# Patient Record
Sex: MALE | Race: WHITE | NOT HISPANIC OR LATINO | Employment: FULL TIME | URBAN - METROPOLITAN AREA
[De-identification: names, ages, dates, MRNs, and addresses within clinical notes are randomized per-mention and may not be internally consistent; named-entity substitution may affect disease eponyms.]

---

## 2020-10-01 ENCOUNTER — OCCUPATIONAL HEALTH (OUTPATIENT)
Dept: URGENT CARE | Facility: CLINIC | Age: 50
End: 2020-10-01

## 2020-10-01 DIAGNOSIS — Z02.1 PRE-EMPLOYMENT EXAMINATION: Primary | ICD-10-CM

## 2020-10-01 PROCEDURE — 99499 UNLISTED E&M SERVICE: CPT | Mod: S$GLB,,, | Performed by: FAMILY MEDICINE

## 2020-10-01 PROCEDURE — 92283 EXTND COLOR VISION XM: CPT | Mod: S$GLB,,, | Performed by: FAMILY MEDICINE

## 2020-10-01 PROCEDURE — 92552 PURE TONE AUDIOMETRY AIR: CPT | Mod: S$GLB,,, | Performed by: FAMILY MEDICINE

## 2020-10-01 PROCEDURE — 99172 PR VISUAL FUNCT SCREENING, BILAT: ICD-10-PCS | Mod: S$GLB,,, | Performed by: FAMILY MEDICINE

## 2020-10-01 PROCEDURE — 92552 PR PURE TONE AUDIOMETRY, AIR: ICD-10-PCS | Mod: S$GLB,,, | Performed by: FAMILY MEDICINE

## 2020-10-01 PROCEDURE — 92283 PR COLOR VISION EXAMINATION: ICD-10-PCS | Mod: S$GLB,,, | Performed by: FAMILY MEDICINE

## 2020-10-01 PROCEDURE — 99499 PHYSICAL, BASIC COMPLEXITY: ICD-10-PCS | Mod: S$GLB,,, | Performed by: FAMILY MEDICINE

## 2020-10-01 PROCEDURE — 99172 OCULAR FUNCTION SCREEN: CPT | Mod: S$GLB,,, | Performed by: FAMILY MEDICINE

## 2020-11-19 ENCOUNTER — OFFICE VISIT (OUTPATIENT)
Dept: FAMILY MEDICINE | Facility: CLINIC | Age: 50
End: 2020-11-19
Payer: OTHER GOVERNMENT

## 2020-11-19 VITALS
HEIGHT: 70 IN | OXYGEN SATURATION: 98 % | TEMPERATURE: 98 F | DIASTOLIC BLOOD PRESSURE: 84 MMHG | HEART RATE: 89 BPM | BODY MASS INDEX: 37.15 KG/M2 | SYSTOLIC BLOOD PRESSURE: 132 MMHG | RESPIRATION RATE: 18 BRPM | WEIGHT: 259.5 LBS

## 2020-11-19 DIAGNOSIS — I10 ESSENTIAL HYPERTENSION: Primary | ICD-10-CM

## 2020-11-19 DIAGNOSIS — R53.83 OTHER FATIGUE: ICD-10-CM

## 2020-11-19 DIAGNOSIS — R63.5 WEIGHT GAIN: ICD-10-CM

## 2020-11-19 DIAGNOSIS — L71.9 ROSACEA: ICD-10-CM

## 2020-11-19 DIAGNOSIS — Z12.11 SCREENING FOR COLON CANCER: ICD-10-CM

## 2020-11-19 DIAGNOSIS — L30.8 OTHER ECZEMA: ICD-10-CM

## 2020-11-19 DIAGNOSIS — E66.01 CLASS 2 SEVERE OBESITY DUE TO EXCESS CALORIES WITH SERIOUS COMORBIDITY AND BODY MASS INDEX (BMI) OF 37.0 TO 37.9 IN ADULT: ICD-10-CM

## 2020-11-19 DIAGNOSIS — F32.5 MAJOR DEPRESSIVE DISORDER WITH SINGLE EPISODE, IN FULL REMISSION: ICD-10-CM

## 2020-11-19 DIAGNOSIS — G47.09 OTHER INSOMNIA: ICD-10-CM

## 2020-11-19 PROBLEM — L30.9 ECZEMA: Status: ACTIVE | Noted: 2020-11-19

## 2020-11-19 PROBLEM — E66.812 CLASS 2 SEVERE OBESITY DUE TO EXCESS CALORIES WITH SERIOUS COMORBIDITY AND BODY MASS INDEX (BMI) OF 37.0 TO 37.9 IN ADULT: Status: ACTIVE | Noted: 2020-11-19

## 2020-11-19 PROCEDURE — 99213 OFFICE O/P EST LOW 20 MIN: CPT | Mod: PBBFAC,PO,25 | Performed by: FAMILY MEDICINE

## 2020-11-19 PROCEDURE — 99999 PR PBB SHADOW E&M-EST. PATIENT-LVL III: ICD-10-PCS | Mod: PBBFAC,,, | Performed by: FAMILY MEDICINE

## 2020-11-19 PROCEDURE — 99204 OFFICE O/P NEW MOD 45 MIN: CPT | Mod: S$PBB,,, | Performed by: FAMILY MEDICINE

## 2020-11-19 PROCEDURE — 99204 PR OFFICE/OUTPT VISIT, NEW, LEVL IV, 45-59 MIN: ICD-10-PCS | Mod: S$PBB,,, | Performed by: FAMILY MEDICINE

## 2020-11-19 PROCEDURE — 90686 IIV4 VACC NO PRSV 0.5 ML IM: CPT | Mod: PBBFAC,PO

## 2020-11-19 PROCEDURE — 99999 PR PBB SHADOW E&M-EST. PATIENT-LVL III: CPT | Mod: PBBFAC,,, | Performed by: FAMILY MEDICINE

## 2020-11-19 RX ORDER — METRONIDAZOLE 10 MG/G
GEL TOPICAL DAILY
COMMUNITY

## 2020-11-19 RX ORDER — LISINOPRIL 20 MG/1
20 TABLET ORAL DAILY
COMMUNITY
End: 2020-11-19

## 2020-11-19 RX ORDER — TRIAMCINOLONE ACETONIDE 5 MG/G
OINTMENT TOPICAL 2 TIMES DAILY
COMMUNITY

## 2020-11-19 RX ORDER — TRAZODONE HYDROCHLORIDE 50 MG/1
50 TABLET ORAL NIGHTLY
COMMUNITY

## 2020-11-19 RX ORDER — DOXYCYCLINE 40 MG/1
40 CAPSULE ORAL DAILY
COMMUNITY

## 2020-11-19 RX ORDER — TELMISARTAN 40 MG/1
40 TABLET ORAL DAILY
Qty: 90 TABLET | Refills: 3 | Status: SHIPPED | OUTPATIENT
Start: 2020-11-19 | End: 2023-10-11

## 2020-11-19 NOTE — PROGRESS NOTES
Subjective:       Patient ID: Tutu Herndon III is a 50 y.o. male.    Chief Complaint: Establish Care    HPI     The patient is coming here today to establish a new primary care physician.    Hypertension:  The patient is currently taking lisinopril but notice that he has been having cough that is intermittently in happens randomly without sputum, the patient stated that he prefer to change the medication for something else, he has been taking the medication for a while.    Fatigue:  The patient complains of feeling tired, the patient stated that he just retired from the  and he was working out 2 hr, he gained 50 lb in 6 months.  He is not exercising as much, he is gaining weight.    Eczema and rosacea:  The patient is using medications daily and is currently seen the dermatologist.    Depression and insomnia:  The patient takes trazodone for depression insomnia with good relief of the symptoms, he does not need the medicine at this time, he will contact us when he needs a new refill.  He denies any side effects of the medication.    The patient is due for colonoscopy and wants this to be schedule.    Past medical history, past social history, past Family history, past surgical history was reviewed discussed with the patient.    Review of Systems   Constitutional: Positive for fatigue and unexpected weight change (He gained approximately 50 lb in 6 months). Negative for activity change, appetite change and chills.   HENT: Negative for congestion and ear discharge.    Eyes: Negative for discharge and itching.   Respiratory: Negative for choking and chest tightness.    Cardiovascular: Negative for chest pain, palpitations and leg swelling.   Gastrointestinal: Negative for abdominal distention and abdominal pain.   Endocrine: Negative for cold intolerance and heat intolerance.   Genitourinary: Negative for dysuria and flank pain.   Musculoskeletal: Negative for arthralgias and back pain.   Skin: Positive for  rash. Negative for pallor.   Allergic/Immunologic: Negative for environmental allergies and food allergies.   Neurological: Negative for dizziness and facial asymmetry.   Hematological: Negative for adenopathy. Does not bruise/bleed easily.   Psychiatric/Behavioral: Negative for agitation and confusion.       Objective:      Physical Exam  Vitals signs and nursing note reviewed.   Constitutional:       General: He is not in acute distress.     Appearance: He is well-developed. He is obese. He is not diaphoretic.   HENT:      Head: Normocephalic and atraumatic.      Right Ear: External ear normal.      Left Ear: External ear normal.      Nose: Nose normal.      Mouth/Throat:      Pharynx: No oropharyngeal exudate.   Eyes:      General:         Right eye: No discharge.         Left eye: No discharge.      Pupils: Pupils are equal, round, and reactive to light.   Neck:      Musculoskeletal: Normal range of motion and neck supple.   Cardiovascular:      Rate and Rhythm: Normal rate and regular rhythm.      Heart sounds: Normal heart sounds. No murmur. No friction rub.   Pulmonary:      Effort: Pulmonary effort is normal. No respiratory distress.      Breath sounds: Normal breath sounds. No wheezing.   Abdominal:      General: Bowel sounds are normal.      Palpations: Abdomen is soft.      Tenderness: There is no abdominal tenderness. There is no guarding.   Musculoskeletal:         General: No tenderness.   Skin:     General: Skin is warm and dry.      Coloration: Skin is not pale.      Findings: No erythema.   Neurological:      Cranial Nerves: No cranial nerve deficit.      Motor: No abnormal muscle tone.      Coordination: Coordination normal.   Psychiatric:         Behavior: Behavior normal.         Thought Content: Thought content normal.         Judgment: Judgment normal.         Assessment:       1. Essential hypertension    2. Other fatigue    3. Weight gain    4. Class 2 severe obesity due to excess calories  with serious comorbidity and body mass index (BMI) of 37.0 to 37.9 in adult    5. Screening for colon cancer    6. Rosacea    7. Other eczema    8. Other insomnia    9. Major depressive disorder with single episode, in full remission        Plan:       Essential hypertension:  Well controlled  -     telmisartan (MICARDIS) 40 MG Tab; Take 1 tablet (40 mg total) by mouth once daily.  Dispense: 90 tablet; Refill: 3  -     Comprehensive Metabolic Panel; Future; Expected date: 11/19/2020  -     Lipid Panel; Future; Expected date: 11/19/2020  -     Microalbumin/Creatinine Ratio, Urine; Future; Expected date: 11/19/2020    Other fatigue:  New problem workup needed   -     CBC Auto Differential; Future; Expected date: 11/19/2020    Weight gain:  New problem workup needed  -     TSH; Future; Expected date: 11/19/2020    Class 2 severe obesity due to excess calories with serious comorbidity and body mass index (BMI) of 37.0 to 37.9 in adult:  New problem workup needed  -     Vitamin D; Future; Expected date: 11/19/2020    Screening for colon cancer  -     Case Request Endoscopy: COLONOSCOPY    Rosacea:  New problem no further workup needed    Other eczema:  New problem, no further workup needed    Other insomnia:  New problem, no further workup needed    Major depressive disorder with single episode, in full remission:  New problem, no further workup needed    Other orders  -     Influenza - Quadrivalent (PF)      Will discontinue lisinopril due to side effects and start patient on telmisartan 40 mg daily, will see the patient back in 3 months.  Will call the patient after have the results of the test, healthy habits, weight loss, increase physical activity as tolerated.  The patient was advised to eat healthy, avoid fried foods red meat and processed starches, eat vegetables, more salads, drink plenty water.  Patient agreed with assessment and plan.  Patient verbalized understanding.

## 2020-11-19 NOTE — PATIENT INSTRUCTIONS
Eating Heart-Healthy Food: Using the DASH Plan    Eating for your heart doesnt have to be hard or boring. You just need to know how to make healthier choices. The DASH eating plan has been developed to help you do just that. DASH stands for Dietary Approaches to Stop Hypertension. It is a plan that has been proven to be healthier for your heart and to lower your risk for high blood pressure. It can also help lower your risk for cancer, heart disease, osteoporosis, and diabetes.  Choosing from each food group  Choose foods from each of the food groups below each day. Try to get the recommended number of servings for each food group. The serving numbers are based on a diet of 2,000 calories a day. Talk to your doctor if youre unsure about your calorie needs. Along with getting the correct servings, the DASH plan also recommends a sodium intake less than 2,300 mg per day.        Grains  Servings: 6 to 8 a day  A serving is:  · 1 slice bread  · 1 ounce dry cereal  · Half a cup cooked rice, pasta or cereal  Best choices: Whole grains and any grains high in fiber. Vegetables  Servings: 4 to 5 a day  A serving is:  · 1 cup raw leafy vegetable  · Half a cup cut-up raw or cooked vegetable  · Half a cup vegetable juice  Best choices: Fresh or frozen vegetables prepared without added salt or fat.   Fruits  Servings: 4 to 5 a day  A serving is:  · 1 medium fruit  · One-quarter cup dried fruit  · Half a cup fresh, frozen, or canned fruit  · Half a cup of 100% fruit juices  Best choices: A variety of fresh fruits of different colors. Whole fruits are a better choice than fruit juices. Low-fat or fat-free dairy  Servings: 2 to 3 a day  A serving is:  · 1 cup milk  · 1 cup yogurt  · One and a half ounces cheese  Best choices: Skim or 1% milk, low-fat or fat-free yogurt or buttermilk, and low-fat cheeses.         Lean meats, poultry, fish  Servings: 6 or fewer a day  A serving is:  · 1 ounce cooked meats, poultry, or fish  · 1  egg  Best choices: Lean poultry and fish. Trim away visible fat. Broil, grill, roast, or boil instead of frying. Remove skin from poultry before eating. Limit how much red meat you eat.  Nuts, seeds, beans  Servings: 4 to 5 a week  A serving is:  · One-third cup nuts (one and a half ounces)  · 2 tablespoons nut butter or seeds  · Half a cup cooked dry beans or legumes  Best choices: Dry roasted nuts with no salt added, lentils, kidney beans, garbanzo beans, and whole null beans.   Fats and oils  Servings: 2 to 3 a day  A serving is:  · 1 teaspoon vegetable oil  · 1 teaspoon soft margarine  · 1 tablespoon mayonnaise  · 2 tablespoons salad dressing  Best choices: Nut and vegetable oils (nontropical vegetable oils), such as olive and canola oil. Sweets  Servings: 5 a week or fewer  A serving is:  · 1 tablespoon sugar, maple syrup, or honey  · 1 tablespoon jam or jelly  · 1 half-ounce jelly beans (about 15)  · 1 cup lemonade  Best choices: Dried fruit can be a satisfying sweet. Choose low-fat sweets. And watch your serving sizes!      For more on the DASH eating plan, visit:  www.nhlbi.nih.gov/health/health-topics/topics/dash   Date Last Reviewed: 6/1/2016  © 2403-5075 [employer]. [address] [place] [address], [place] [address]. All rights reserved. This information is not intended as a substitute for professional medical care. Always follow your healthcare professional's instructions.

## 2020-12-17 ENCOUNTER — TELEPHONE (OUTPATIENT)
Dept: GASTROENTEROLOGY | Facility: CLINIC | Age: 50
End: 2020-12-17

## 2020-12-17 NOTE — TELEPHONE ENCOUNTER
Spoke with patient to schedule a colonoscopy. Patient's phone seems to be acting up, he was there and then he wasn't. This happened several times before the line went dead. Will try again.

## 2020-12-23 ENCOUNTER — TELEPHONE (OUTPATIENT)
Dept: GASTROENTEROLOGY | Facility: CLINIC | Age: 50
End: 2020-12-23

## 2021-02-08 ENCOUNTER — PATIENT OUTREACH (OUTPATIENT)
Dept: ADMINISTRATIVE | Facility: HOSPITAL | Age: 51
End: 2021-02-08

## 2021-02-22 ENCOUNTER — TELEPHONE (OUTPATIENT)
Dept: FAMILY MEDICINE | Facility: CLINIC | Age: 51
End: 2021-02-22

## 2021-03-08 ENCOUNTER — OFFICE VISIT (OUTPATIENT)
Dept: SURGERY | Facility: CLINIC | Age: 51
End: 2021-03-08
Payer: OTHER GOVERNMENT

## 2021-03-08 VITALS
WEIGHT: 254.44 LBS | BODY MASS INDEX: 36.5 KG/M2 | SYSTOLIC BLOOD PRESSURE: 139 MMHG | DIASTOLIC BLOOD PRESSURE: 86 MMHG | HEART RATE: 71 BPM

## 2021-03-08 DIAGNOSIS — C43.9 MALIGNANT MELANOMA, UNSPECIFIED SITE: Primary | ICD-10-CM

## 2021-03-08 DIAGNOSIS — Z01.818 PREOP TESTING: ICD-10-CM

## 2021-03-08 PROCEDURE — 99999 PR PBB SHADOW E&M-EST. PATIENT-LVL IV: ICD-10-PCS | Mod: PBBFAC,,, | Performed by: SURGERY

## 2021-03-08 PROCEDURE — 99204 OFFICE O/P NEW MOD 45 MIN: CPT | Mod: S$PBB,,, | Performed by: SURGERY

## 2021-03-08 PROCEDURE — 99214 OFFICE O/P EST MOD 30 MIN: CPT | Mod: PBBFAC,PO | Performed by: SURGERY

## 2021-03-08 PROCEDURE — 99204 PR OFFICE/OUTPT VISIT, NEW, LEVL IV, 45-59 MIN: ICD-10-PCS | Mod: S$PBB,,, | Performed by: SURGERY

## 2021-03-08 PROCEDURE — 99999 PR PBB SHADOW E&M-EST. PATIENT-LVL IV: CPT | Mod: PBBFAC,,, | Performed by: SURGERY

## 2021-03-08 RX ORDER — MULTIVITAMIN
1 TABLET ORAL
COMMUNITY

## 2021-03-08 RX ORDER — NAPROXEN SODIUM 220 MG
220 TABLET ORAL
COMMUNITY

## 2021-03-09 RX ORDER — SODIUM CHLORIDE 9 MG/ML
INJECTION, SOLUTION INTRAVENOUS CONTINUOUS
Status: CANCELLED | OUTPATIENT
Start: 2021-03-18

## 2021-03-16 PROCEDURE — 93010 EKG 12-LEAD: ICD-10-PCS | Mod: S$PBB,,, | Performed by: INTERNAL MEDICINE

## 2021-03-16 PROCEDURE — 93010 ELECTROCARDIOGRAM REPORT: CPT | Mod: S$PBB,,, | Performed by: INTERNAL MEDICINE

## 2021-03-16 PROCEDURE — 93005 ELECTROCARDIOGRAM TRACING: CPT | Mod: PBBFAC,PO | Performed by: INTERNAL MEDICINE

## 2021-03-18 ENCOUNTER — ANESTHESIA EVENT (OUTPATIENT)
Dept: SURGERY | Facility: HOSPITAL | Age: 51
End: 2021-03-18
Payer: OTHER GOVERNMENT

## 2021-03-19 ENCOUNTER — HOSPITAL ENCOUNTER (OUTPATIENT)
Facility: HOSPITAL | Age: 51
Discharge: HOME | End: 2021-03-19
Attending: SURGERY | Admitting: SURGERY
Payer: OTHER GOVERNMENT

## 2021-03-19 ENCOUNTER — ANESTHESIA (OUTPATIENT)
Dept: SURGERY | Facility: HOSPITAL | Age: 51
End: 2021-03-19
Payer: OTHER GOVERNMENT

## 2021-03-19 VITALS
SYSTOLIC BLOOD PRESSURE: 112 MMHG | WEIGHT: 245 LBS | TEMPERATURE: 98 F | HEART RATE: 96 BPM | DIASTOLIC BLOOD PRESSURE: 67 MMHG | BODY MASS INDEX: 35.07 KG/M2 | HEIGHT: 70 IN | RESPIRATION RATE: 16 BRPM | OXYGEN SATURATION: 100 %

## 2021-03-19 DIAGNOSIS — C43.59 MALIGNANT MELANOMA OF TORSO EXCLUDING BREAST: Primary | ICD-10-CM

## 2021-03-19 DIAGNOSIS — C43.9 MALIGNANT MELANOMA, UNSPECIFIED SITE: ICD-10-CM

## 2021-03-19 DIAGNOSIS — Z01.818 PREOP TESTING: ICD-10-CM

## 2021-03-19 LAB
ALBUMIN SERPL BCP-MCNC: 4.1 G/DL (ref 3.5–5.2)
ALP SERPL-CCNC: 60 U/L (ref 55–135)
ALT SERPL W/O P-5'-P-CCNC: 33 U/L (ref 10–44)
ANION GAP SERPL CALC-SCNC: 9 MMOL/L (ref 8–16)
AST SERPL-CCNC: 31 U/L (ref 10–40)
BASOPHILS # BLD AUTO: 0.1 K/UL (ref 0–0.2)
BASOPHILS NFR BLD: 1 % (ref 0–1.9)
BILIRUB SERPL-MCNC: 0.7 MG/DL (ref 0.1–1)
BUN SERPL-MCNC: 17 MG/DL (ref 6–20)
CALCIUM SERPL-MCNC: 9.2 MG/DL (ref 8.7–10.5)
CHLORIDE SERPL-SCNC: 104 MMOL/L (ref 95–110)
CO2 SERPL-SCNC: 27 MMOL/L (ref 23–29)
CREAT SERPL-MCNC: 1 MG/DL (ref 0.5–1.4)
DIFFERENTIAL METHOD: ABNORMAL
EOSINOPHIL # BLD AUTO: 0.3 K/UL (ref 0–0.5)
EOSINOPHIL NFR BLD: 2.8 % (ref 0–8)
ERYTHROCYTE [DISTWIDTH] IN BLOOD BY AUTOMATED COUNT: 15.5 % (ref 11.5–14.5)
EST. GFR  (AFRICAN AMERICAN): >60 ML/MIN/1.73 M^2
EST. GFR  (NON AFRICAN AMERICAN): >60 ML/MIN/1.73 M^2
GLUCOSE SERPL-MCNC: 96 MG/DL (ref 70–110)
HCT VFR BLD AUTO: 52.7 % (ref 40–54)
HGB BLD-MCNC: 17.7 G/DL (ref 14–18)
IMM GRANULOCYTES # BLD AUTO: 0.05 K/UL (ref 0–0.04)
IMM GRANULOCYTES NFR BLD AUTO: 0.5 % (ref 0–0.5)
LYMPHOCYTES # BLD AUTO: 3.6 K/UL (ref 1–4.8)
LYMPHOCYTES NFR BLD: 36.8 % (ref 18–48)
MCH RBC QN AUTO: 28.9 PG (ref 27–31)
MCHC RBC AUTO-ENTMCNC: 33.6 G/DL (ref 32–36)
MCV RBC AUTO: 86 FL (ref 82–98)
MONOCYTES # BLD AUTO: 0.8 K/UL (ref 0.3–1)
MONOCYTES NFR BLD: 7.6 % (ref 4–15)
NEUTROPHILS # BLD AUTO: 5.1 K/UL (ref 1.8–7.7)
NEUTROPHILS NFR BLD: 51.3 % (ref 38–73)
NRBC BLD-RTO: 0 /100 WBC
PLATELET # BLD AUTO: 236 K/UL (ref 150–350)
PMV BLD AUTO: 9.8 FL (ref 9.2–12.9)
POTASSIUM SERPL-SCNC: 4.7 MMOL/L (ref 3.5–5.1)
PROT SERPL-MCNC: 7.3 G/DL (ref 6–8.4)
RBC # BLD AUTO: 6.13 M/UL (ref 4.6–6.2)
SODIUM SERPL-SCNC: 140 MMOL/L (ref 136–145)
WBC # BLD AUTO: 9.86 K/UL (ref 3.9–12.7)

## 2021-03-19 PROCEDURE — 99900103 DSU ONLY-NO CHARGE-INITIAL HR (STAT)

## 2021-03-19 PROCEDURE — 71000015 HC POSTOP RECOV 1ST HR: Performed by: SURGERY

## 2021-03-19 PROCEDURE — D9220A PRA ANESTHESIA: Mod: ,,, | Performed by: ANESTHESIOLOGY

## 2021-03-19 PROCEDURE — 25000003 PHARM REV CODE 250: Performed by: ANESTHESIOLOGY

## 2021-03-19 PROCEDURE — 37000008 HC ANESTHESIA 1ST 15 MINUTES: Performed by: SURGERY

## 2021-03-19 PROCEDURE — 36415 COLL VENOUS BLD VENIPUNCTURE: CPT | Performed by: SURGERY

## 2021-03-19 PROCEDURE — 88305 TISSUE EXAM BY PATHOLOGIST: ICD-10-PCS | Mod: 26,,, | Performed by: DERMATOLOGY

## 2021-03-19 PROCEDURE — 11606 EXC TR-EXT MAL+MARG >4 CM: CPT | Mod: ,,, | Performed by: SURGERY

## 2021-03-19 PROCEDURE — 80053 COMPREHEN METABOLIC PANEL: CPT | Performed by: SURGERY

## 2021-03-19 PROCEDURE — 63600175 PHARM REV CODE 636 W HCPCS: Performed by: NURSE ANESTHETIST, CERTIFIED REGISTERED

## 2021-03-19 PROCEDURE — 25000003 PHARM REV CODE 250: Performed by: NURSE ANESTHETIST, CERTIFIED REGISTERED

## 2021-03-19 PROCEDURE — 88305 TISSUE EXAM BY PATHOLOGIST: CPT | Performed by: DERMATOLOGY

## 2021-03-19 PROCEDURE — 12034 INTMD RPR S/TR/EXT 7.6-12.5: CPT | Mod: 51,,, | Performed by: SURGERY

## 2021-03-19 PROCEDURE — 99900104 DSU ONLY-NO CHARGE-EA ADD'L HR (STAT): Performed by: SURGERY

## 2021-03-19 PROCEDURE — 88305 TISSUE EXAM BY PATHOLOGIST: CPT | Mod: 26,,, | Performed by: DERMATOLOGY

## 2021-03-19 PROCEDURE — 71000033 HC RECOVERY, INTIAL HOUR: Performed by: SURGERY

## 2021-03-19 PROCEDURE — 85025 COMPLETE CBC W/AUTO DIFF WBC: CPT | Performed by: SURGERY

## 2021-03-19 PROCEDURE — 99900103 DSU ONLY-NO CHARGE-INITIAL HR (STAT): Performed by: SURGERY

## 2021-03-19 PROCEDURE — 25000003 PHARM REV CODE 250: Performed by: SURGERY

## 2021-03-19 PROCEDURE — 00300 ANES ALL PX INTEG H/N/PTRUNK: CPT | Performed by: SURGERY

## 2021-03-19 PROCEDURE — 36000706: Performed by: SURGERY

## 2021-03-19 PROCEDURE — D9220A PRA ANESTHESIA: ICD-10-PCS | Mod: ,,, | Performed by: ANESTHESIOLOGY

## 2021-03-19 PROCEDURE — 36000707: Performed by: SURGERY

## 2021-03-19 PROCEDURE — 12034 PR LAYR CLOS WND TRUNK,ARM,LEG 7.6-12.5 CM: ICD-10-PCS | Mod: 51,,, | Performed by: SURGERY

## 2021-03-19 PROCEDURE — 11606 PR EXC SKIN MALIG >4 CM TRUNK,ARM,LEG: ICD-10-PCS | Mod: ,,, | Performed by: SURGERY

## 2021-03-19 PROCEDURE — 37000009 HC ANESTHESIA EA ADD 15 MINS: Performed by: SURGERY

## 2021-03-19 RX ORDER — DEXAMETHASONE SODIUM PHOSPHATE 4 MG/ML
INJECTION, SOLUTION INTRA-ARTICULAR; INTRALESIONAL; INTRAMUSCULAR; INTRAVENOUS; SOFT TISSUE
Status: DISCONTINUED | OUTPATIENT
Start: 2021-03-19 | End: 2021-03-19

## 2021-03-19 RX ORDER — MIDAZOLAM HYDROCHLORIDE 1 MG/ML
INJECTION INTRAMUSCULAR; INTRAVENOUS
Status: DISCONTINUED | OUTPATIENT
Start: 2021-03-19 | End: 2021-03-19

## 2021-03-19 RX ORDER — SODIUM CHLORIDE 9 MG/ML
INJECTION, SOLUTION INTRAVENOUS CONTINUOUS
Status: DISCONTINUED | OUTPATIENT
Start: 2021-03-19 | End: 2021-03-19 | Stop reason: HOSPADM

## 2021-03-19 RX ORDER — KETOROLAC TROMETHAMINE 30 MG/ML
INJECTION, SOLUTION INTRAMUSCULAR; INTRAVENOUS
Status: DISCONTINUED | OUTPATIENT
Start: 2021-03-19 | End: 2021-03-19

## 2021-03-19 RX ORDER — AMOXICILLIN 500 MG
CAPSULE ORAL DAILY
COMMUNITY
End: 2023-02-15 | Stop reason: ALTCHOICE

## 2021-03-19 RX ORDER — FENTANYL CITRATE 50 UG/ML
25 INJECTION, SOLUTION INTRAMUSCULAR; INTRAVENOUS EVERY 5 MIN PRN
Status: DISCONTINUED | OUTPATIENT
Start: 2021-03-19 | End: 2021-03-19 | Stop reason: HOSPADM

## 2021-03-19 RX ORDER — PHENYLEPHRINE HYDROCHLORIDE 10 MG/ML
INJECTION INTRAVENOUS
Status: DISCONTINUED | OUTPATIENT
Start: 2021-03-19 | End: 2021-03-19

## 2021-03-19 RX ORDER — PROCHLORPERAZINE EDISYLATE 5 MG/ML
5 INJECTION INTRAMUSCULAR; INTRAVENOUS EVERY 30 MIN PRN
Status: DISCONTINUED | OUTPATIENT
Start: 2021-03-19 | End: 2021-03-19 | Stop reason: HOSPADM

## 2021-03-19 RX ORDER — ONDANSETRON 2 MG/ML
INJECTION INTRAMUSCULAR; INTRAVENOUS
Status: DISCONTINUED | OUTPATIENT
Start: 2021-03-19 | End: 2021-03-19

## 2021-03-19 RX ORDER — SODIUM CHLORIDE 0.9 % (FLUSH) 0.9 %
10 SYRINGE (ML) INJECTION
Status: DISCONTINUED | OUTPATIENT
Start: 2021-03-19 | End: 2021-03-19 | Stop reason: HOSPADM

## 2021-03-19 RX ORDER — BUPIVACAINE HYDROCHLORIDE AND EPINEPHRINE 2.5; 5 MG/ML; UG/ML
INJECTION, SOLUTION EPIDURAL; INFILTRATION; INTRACAUDAL; PERINEURAL
Status: DISCONTINUED | OUTPATIENT
Start: 2021-03-19 | End: 2021-03-19 | Stop reason: HOSPADM

## 2021-03-19 RX ORDER — HYDROCODONE BITARTRATE AND ACETAMINOPHEN 5; 325 MG/1; MG/1
1 TABLET ORAL EVERY 6 HOURS PRN
Qty: 10 TABLET | Refills: 0 | Status: SHIPPED | OUTPATIENT
Start: 2021-03-19 | End: 2022-09-13

## 2021-03-19 RX ORDER — ROCURONIUM BROMIDE 10 MG/ML
INJECTION, SOLUTION INTRAVENOUS
Status: DISCONTINUED | OUTPATIENT
Start: 2021-03-19 | End: 2021-03-19

## 2021-03-19 RX ORDER — LIDOCAINE HYDROCHLORIDE 10 MG/ML
0.5 INJECTION, SOLUTION EPIDURAL; INFILTRATION; INTRACAUDAL; PERINEURAL ONCE
Status: DISCONTINUED | OUTPATIENT
Start: 2021-03-19 | End: 2021-03-19 | Stop reason: HOSPADM

## 2021-03-19 RX ORDER — FENTANYL CITRATE 50 UG/ML
INJECTION, SOLUTION INTRAMUSCULAR; INTRAVENOUS
Status: DISCONTINUED | OUTPATIENT
Start: 2021-03-19 | End: 2021-03-19

## 2021-03-19 RX ORDER — CLINDAMYCIN PHOSPHATE 900 MG/50ML
900 INJECTION, SOLUTION INTRAVENOUS
Status: COMPLETED | OUTPATIENT
Start: 2021-03-19 | End: 2021-03-19

## 2021-03-19 RX ORDER — SUCCINYLCHOLINE CHLORIDE 20 MG/ML
INJECTION INTRAMUSCULAR; INTRAVENOUS
Status: DISCONTINUED | OUTPATIENT
Start: 2021-03-19 | End: 2021-03-19

## 2021-03-19 RX ORDER — EPHEDRINE SULFATE 50 MG/ML
INJECTION, SOLUTION INTRAVENOUS
Status: DISCONTINUED | OUTPATIENT
Start: 2021-03-19 | End: 2021-03-19

## 2021-03-19 RX ORDER — SODIUM CHLORIDE, SODIUM LACTATE, POTASSIUM CHLORIDE, CALCIUM CHLORIDE 600; 310; 30; 20 MG/100ML; MG/100ML; MG/100ML; MG/100ML
INJECTION, SOLUTION INTRAVENOUS CONTINUOUS
Status: DISCONTINUED | OUTPATIENT
Start: 2021-03-19 | End: 2021-03-19 | Stop reason: HOSPADM

## 2021-03-19 RX ORDER — PROPOFOL 10 MG/ML
VIAL (ML) INTRAVENOUS
Status: DISCONTINUED | OUTPATIENT
Start: 2021-03-19 | End: 2021-03-19

## 2021-03-19 RX ORDER — OXYCODONE HYDROCHLORIDE 5 MG/1
5 TABLET ORAL
Status: DISCONTINUED | OUTPATIENT
Start: 2021-03-19 | End: 2021-03-19 | Stop reason: HOSPADM

## 2021-03-19 RX ORDER — METOCLOPRAMIDE HYDROCHLORIDE 5 MG/ML
10 INJECTION INTRAMUSCULAR; INTRAVENOUS EVERY 10 MIN PRN
Status: DISCONTINUED | OUTPATIENT
Start: 2021-03-19 | End: 2021-03-19 | Stop reason: HOSPADM

## 2021-03-19 RX ORDER — LIDOCAINE HCL/PF 100 MG/5ML
SYRINGE (ML) INTRAVENOUS
Status: DISCONTINUED | OUTPATIENT
Start: 2021-03-19 | End: 2021-03-19

## 2021-03-19 RX ADMIN — SUCCINYLCHOLINE CHLORIDE 200 MG: 20 INJECTION, SOLUTION INTRAMUSCULAR; INTRAVENOUS; PARENTERAL at 07:03

## 2021-03-19 RX ADMIN — EPHEDRINE SULFATE 10 MG: 50 INJECTION, SOLUTION INTRAMUSCULAR; INTRAVENOUS; SUBCUTANEOUS at 08:03

## 2021-03-19 RX ADMIN — LIDOCAINE HYDROCHLORIDE 100 MG: 20 INJECTION, SOLUTION INTRAVENOUS at 07:03

## 2021-03-19 RX ADMIN — ONDANSETRON 4 MG: 2 INJECTION, SOLUTION INTRAMUSCULAR; INTRAVENOUS at 08:03

## 2021-03-19 RX ADMIN — PHENYLEPHRINE HYDROCHLORIDE 200 MCG: 10 INJECTION INTRAVENOUS at 08:03

## 2021-03-19 RX ADMIN — EPHEDRINE SULFATE 20 MG: 50 INJECTION, SOLUTION INTRAMUSCULAR; INTRAVENOUS; SUBCUTANEOUS at 08:03

## 2021-03-19 RX ADMIN — FENTANYL CITRATE 50 MCG: 50 INJECTION, SOLUTION INTRAMUSCULAR; INTRAVENOUS at 08:03

## 2021-03-19 RX ADMIN — ROCURONIUM BROMIDE 10 MG: 10 INJECTION, SOLUTION INTRAVENOUS at 07:03

## 2021-03-19 RX ADMIN — DEXAMETHASONE SODIUM PHOSPHATE 4 MG: 4 INJECTION, SOLUTION INTRA-ARTICULAR; INTRALESIONAL; INTRAMUSCULAR; INTRAVENOUS; SOFT TISSUE at 08:03

## 2021-03-19 RX ADMIN — CLINDAMYCIN PHOSPHATE 900 MG: 18 INJECTION, SOLUTION INTRAVENOUS at 07:03

## 2021-03-19 RX ADMIN — PHENYLEPHRINE HYDROCHLORIDE 100 MCG: 10 INJECTION INTRAVENOUS at 08:03

## 2021-03-19 RX ADMIN — SODIUM CHLORIDE, SODIUM GLUCONATE, SODIUM ACETATE, POTASSIUM CHLORIDE, MAGNESIUM CHLORIDE, SODIUM PHOSPHATE, DIBASIC, AND POTASSIUM PHOSPHATE: .53; .5; .37; .037; .03; .012; .00082 INJECTION, SOLUTION INTRAVENOUS at 06:03

## 2021-03-19 RX ADMIN — FENTANYL CITRATE 50 MCG: 50 INJECTION, SOLUTION INTRAMUSCULAR; INTRAVENOUS at 07:03

## 2021-03-19 RX ADMIN — PROPOFOL 200 MG: 10 INJECTION, EMULSION INTRAVENOUS at 07:03

## 2021-03-19 RX ADMIN — MIDAZOLAM HYDROCHLORIDE 2 MG: 1 INJECTION, SOLUTION INTRAMUSCULAR; INTRAVENOUS at 07:03

## 2021-03-19 RX ADMIN — KETOROLAC TROMETHAMINE 30 MG: 30 INJECTION, SOLUTION INTRAMUSCULAR; INTRAVENOUS at 08:03

## 2021-03-25 LAB
FINAL PATHOLOGIC DIAGNOSIS: NORMAL
GROSS: NORMAL
Lab: NORMAL
MICROSCOPIC EXAM: NORMAL

## 2021-04-05 ENCOUNTER — OFFICE VISIT (OUTPATIENT)
Dept: SURGERY | Facility: CLINIC | Age: 51
End: 2021-04-05
Payer: OTHER GOVERNMENT

## 2021-04-05 VITALS — BODY MASS INDEX: 36.2 KG/M2 | WEIGHT: 252.88 LBS | HEIGHT: 70 IN | RESPIRATION RATE: 16 BRPM | TEMPERATURE: 99 F

## 2021-04-05 DIAGNOSIS — C43.9 MALIGNANT MELANOMA, UNSPECIFIED SITE: Primary | ICD-10-CM

## 2021-04-05 PROCEDURE — 99024 POSTOP FOLLOW-UP VISIT: CPT | Mod: ,,, | Performed by: SURGERY

## 2021-04-05 PROCEDURE — 99024 PR POST-OP FOLLOW-UP VISIT: ICD-10-PCS | Mod: ,,, | Performed by: SURGERY

## 2021-04-05 PROCEDURE — 99214 OFFICE O/P EST MOD 30 MIN: CPT | Mod: PBBFAC,PO | Performed by: SURGERY

## 2021-04-05 PROCEDURE — 99999 PR PBB SHADOW E&M-EST. PATIENT-LVL IV: CPT | Mod: PBBFAC,,, | Performed by: SURGERY

## 2021-04-05 PROCEDURE — 99999 PR PBB SHADOW E&M-EST. PATIENT-LVL IV: ICD-10-PCS | Mod: PBBFAC,,, | Performed by: SURGERY

## 2021-04-19 ENCOUNTER — OFFICE VISIT (OUTPATIENT)
Dept: HEMATOLOGY/ONCOLOGY | Facility: CLINIC | Age: 51
End: 2021-04-19
Payer: OTHER GOVERNMENT

## 2021-04-19 VITALS
BODY MASS INDEX: 35.42 KG/M2 | HEART RATE: 76 BPM | SYSTOLIC BLOOD PRESSURE: 140 MMHG | WEIGHT: 247.38 LBS | DIASTOLIC BLOOD PRESSURE: 92 MMHG | HEIGHT: 70 IN | OXYGEN SATURATION: 96 % | RESPIRATION RATE: 18 BRPM

## 2021-04-19 DIAGNOSIS — C43.9 MALIGNANT MELANOMA, UNSPECIFIED SITE: ICD-10-CM

## 2021-04-19 PROCEDURE — 99999 PR PBB SHADOW E&M-EST. PATIENT-LVL IV: ICD-10-PCS | Mod: PBBFAC,,, | Performed by: INTERNAL MEDICINE

## 2021-04-19 PROCEDURE — 99214 OFFICE O/P EST MOD 30 MIN: CPT | Mod: PBBFAC,PN | Performed by: INTERNAL MEDICINE

## 2021-04-19 PROCEDURE — 99244 PR OFFICE CONSULTATION,LEVEL IV: ICD-10-PCS | Mod: S$PBB,,, | Performed by: INTERNAL MEDICINE

## 2021-04-19 PROCEDURE — 99999 PR PBB SHADOW E&M-EST. PATIENT-LVL IV: CPT | Mod: PBBFAC,,, | Performed by: INTERNAL MEDICINE

## 2021-04-19 PROCEDURE — 99244 OFF/OP CNSLTJ NEW/EST MOD 40: CPT | Mod: S$PBB,,, | Performed by: INTERNAL MEDICINE

## 2021-04-19 RX ORDER — PRAZOSIN HYDROCHLORIDE 2 MG/1
1 CAPSULE ORAL NIGHTLY
COMMUNITY

## 2021-04-19 RX ORDER — SERTRALINE HYDROCHLORIDE 25 MG/1
25 TABLET, FILM COATED ORAL DAILY
COMMUNITY

## 2021-07-02 ENCOUNTER — OFFICE VISIT (OUTPATIENT)
Dept: URGENT CARE | Facility: CLINIC | Age: 51
End: 2021-07-02
Payer: OTHER GOVERNMENT

## 2021-07-02 VITALS
BODY MASS INDEX: 35.36 KG/M2 | OXYGEN SATURATION: 97 % | HEART RATE: 108 BPM | SYSTOLIC BLOOD PRESSURE: 151 MMHG | HEIGHT: 70 IN | TEMPERATURE: 99 F | DIASTOLIC BLOOD PRESSURE: 94 MMHG | RESPIRATION RATE: 16 BRPM | WEIGHT: 247 LBS

## 2021-07-02 DIAGNOSIS — J01.90 ACUTE SINUSITIS, RECURRENCE NOT SPECIFIED, UNSPECIFIED LOCATION: Primary | ICD-10-CM

## 2021-07-02 LAB
CTP QC/QA: YES
SARS-COV-2 RDRP RESP QL NAA+PROBE: NEGATIVE

## 2021-07-02 PROCEDURE — U0002 COVID-19 LAB TEST NON-CDC: HCPCS | Mod: QW,S$GLB,, | Performed by: PHYSICIAN ASSISTANT

## 2021-07-02 PROCEDURE — U0002: ICD-10-PCS | Mod: QW,S$GLB,, | Performed by: PHYSICIAN ASSISTANT

## 2021-07-02 PROCEDURE — 99214 PR OFFICE/OUTPT VISIT, EST, LEVL IV, 30-39 MIN: ICD-10-PCS | Mod: 25,S$GLB,, | Performed by: PHYSICIAN ASSISTANT

## 2021-07-02 PROCEDURE — 99214 OFFICE O/P EST MOD 30 MIN: CPT | Mod: 25,S$GLB,, | Performed by: PHYSICIAN ASSISTANT

## 2021-07-02 PROCEDURE — 96372 PR INJECTION,THERAP/PROPH/DIAG2ST, IM OR SUBCUT: ICD-10-PCS | Mod: S$GLB,,, | Performed by: PHYSICIAN ASSISTANT

## 2021-07-02 PROCEDURE — 96372 THER/PROPH/DIAG INJ SC/IM: CPT | Mod: S$GLB,,, | Performed by: PHYSICIAN ASSISTANT

## 2021-07-02 RX ORDER — BENZONATATE 100 MG/1
100 CAPSULE ORAL EVERY 6 HOURS PRN
Qty: 28 CAPSULE | Refills: 0 | Status: SHIPPED | OUTPATIENT
Start: 2021-07-02 | End: 2021-07-09

## 2021-07-02 RX ORDER — DEXAMETHASONE SODIUM PHOSPHATE 100 MG/10ML
8 INJECTION INTRAMUSCULAR; INTRAVENOUS
Status: COMPLETED | OUTPATIENT
Start: 2021-07-02 | End: 2021-07-02

## 2021-07-02 RX ORDER — PROMETHAZINE HYDROCHLORIDE AND DEXTROMETHORPHAN HYDROBROMIDE 6.25; 15 MG/5ML; MG/5ML
5 SYRUP ORAL
Qty: 180 ML | Refills: 0 | Status: SHIPPED | OUTPATIENT
Start: 2021-07-02 | End: 2021-07-09

## 2021-07-02 RX ORDER — CEFDINIR 300 MG/1
300 CAPSULE ORAL 2 TIMES DAILY
Qty: 20 CAPSULE | Refills: 0 | Status: SHIPPED | OUTPATIENT
Start: 2021-07-02 | End: 2021-07-12

## 2021-07-02 RX ADMIN — DEXAMETHASONE SODIUM PHOSPHATE 8 MG: 100 INJECTION INTRAMUSCULAR; INTRAVENOUS at 04:07

## 2021-07-16 DIAGNOSIS — C43.9 MALIGNANT MELANOMA, UNSPECIFIED SITE: Primary | ICD-10-CM

## 2021-07-19 ENCOUNTER — OFFICE VISIT (OUTPATIENT)
Dept: HEMATOLOGY/ONCOLOGY | Facility: CLINIC | Age: 51
End: 2021-07-19
Payer: OTHER GOVERNMENT

## 2021-07-19 ENCOUNTER — PATIENT MESSAGE (OUTPATIENT)
Dept: HEMATOLOGY/ONCOLOGY | Facility: CLINIC | Age: 51
End: 2021-07-19

## 2021-07-19 ENCOUNTER — LAB VISIT (OUTPATIENT)
Dept: LAB | Facility: HOSPITAL | Age: 51
End: 2021-07-19
Attending: INTERNAL MEDICINE
Payer: OTHER GOVERNMENT

## 2021-07-19 VITALS
OXYGEN SATURATION: 98 % | TEMPERATURE: 98 F | BODY MASS INDEX: 37.18 KG/M2 | HEART RATE: 65 BPM | DIASTOLIC BLOOD PRESSURE: 94 MMHG | WEIGHT: 259.69 LBS | RESPIRATION RATE: 18 BRPM | HEIGHT: 70 IN | SYSTOLIC BLOOD PRESSURE: 141 MMHG

## 2021-07-19 DIAGNOSIS — C43.9 MALIGNANT MELANOMA, UNSPECIFIED SITE: ICD-10-CM

## 2021-07-19 DIAGNOSIS — C43.9 MALIGNANT MELANOMA, UNSPECIFIED SITE: Primary | ICD-10-CM

## 2021-07-19 LAB
ALBUMIN SERPL BCP-MCNC: 3.8 G/DL (ref 3.5–5.2)
ALBUMIN SERPL BCP-MCNC: 3.8 G/DL (ref 3.5–5.2)
ALP SERPL-CCNC: 61 U/L (ref 55–135)
ALP SERPL-CCNC: 61 U/L (ref 55–135)
ALT SERPL W/O P-5'-P-CCNC: 32 U/L (ref 10–44)
ALT SERPL W/O P-5'-P-CCNC: 32 U/L (ref 10–44)
ANION GAP SERPL CALC-SCNC: 11 MMOL/L (ref 8–16)
ANION GAP SERPL CALC-SCNC: 11 MMOL/L (ref 8–16)
AST SERPL-CCNC: 35 U/L (ref 10–40)
AST SERPL-CCNC: 35 U/L (ref 10–40)
BASOPHILS # BLD AUTO: 0.12 K/UL (ref 0–0.2)
BASOPHILS NFR BLD: 1.3 % (ref 0–1.9)
BILIRUB SERPL-MCNC: 0.4 MG/DL (ref 0.1–1)
BILIRUB SERPL-MCNC: 0.4 MG/DL (ref 0.1–1)
BUN SERPL-MCNC: 14 MG/DL (ref 6–20)
BUN SERPL-MCNC: 14 MG/DL (ref 6–20)
CALCIUM SERPL-MCNC: 10.1 MG/DL (ref 8.7–10.5)
CALCIUM SERPL-MCNC: 10.1 MG/DL (ref 8.7–10.5)
CHLORIDE SERPL-SCNC: 106 MMOL/L (ref 95–110)
CHLORIDE SERPL-SCNC: 106 MMOL/L (ref 95–110)
CO2 SERPL-SCNC: 25 MMOL/L (ref 23–29)
CO2 SERPL-SCNC: 25 MMOL/L (ref 23–29)
CREAT SERPL-MCNC: 1.2 MG/DL (ref 0.5–1.4)
CREAT SERPL-MCNC: 1.2 MG/DL (ref 0.5–1.4)
DIFFERENTIAL METHOD: ABNORMAL
EOSINOPHIL # BLD AUTO: 0.3 K/UL (ref 0–0.5)
EOSINOPHIL NFR BLD: 2.8 % (ref 0–8)
ERYTHROCYTE [DISTWIDTH] IN BLOOD BY AUTOMATED COUNT: 14.9 % (ref 11.5–14.5)
EST. GFR  (AFRICAN AMERICAN): >60 ML/MIN/1.73 M^2
EST. GFR  (AFRICAN AMERICAN): >60 ML/MIN/1.73 M^2
EST. GFR  (NON AFRICAN AMERICAN): >60 ML/MIN/1.73 M^2
EST. GFR  (NON AFRICAN AMERICAN): >60 ML/MIN/1.73 M^2
GLUCOSE SERPL-MCNC: 97 MG/DL (ref 70–110)
GLUCOSE SERPL-MCNC: 97 MG/DL (ref 70–110)
HCT VFR BLD AUTO: 53.5 % (ref 40–54)
HGB BLD-MCNC: 17.8 G/DL (ref 14–18)
IMM GRANULOCYTES # BLD AUTO: 0.04 K/UL (ref 0–0.04)
IMM GRANULOCYTES NFR BLD AUTO: 0.4 % (ref 0–0.5)
LDH SERPL L TO P-CCNC: 251 U/L (ref 110–260)
LYMPHOCYTES # BLD AUTO: 3.3 K/UL (ref 1–4.8)
LYMPHOCYTES NFR BLD: 35.8 % (ref 18–48)
MCH RBC QN AUTO: 28.3 PG (ref 27–31)
MCHC RBC AUTO-ENTMCNC: 33.3 G/DL (ref 32–36)
MCV RBC AUTO: 85 FL (ref 82–98)
MONOCYTES # BLD AUTO: 0.7 K/UL (ref 0.3–1)
MONOCYTES NFR BLD: 8 % (ref 4–15)
NEUTROPHILS # BLD AUTO: 4.8 K/UL (ref 1.8–7.7)
NEUTROPHILS NFR BLD: 51.7 % (ref 38–73)
NRBC BLD-RTO: 0 /100 WBC
PLATELET # BLD AUTO: 255 K/UL (ref 150–450)
PMV BLD AUTO: 9.3 FL (ref 9.2–12.9)
POTASSIUM SERPL-SCNC: 4.3 MMOL/L (ref 3.5–5.1)
POTASSIUM SERPL-SCNC: 4.3 MMOL/L (ref 3.5–5.1)
PROT SERPL-MCNC: 7.3 G/DL (ref 6–8.4)
PROT SERPL-MCNC: 7.3 G/DL (ref 6–8.4)
RBC # BLD AUTO: 6.28 M/UL (ref 4.6–6.2)
SODIUM SERPL-SCNC: 142 MMOL/L (ref 136–145)
SODIUM SERPL-SCNC: 142 MMOL/L (ref 136–145)
WBC # BLD AUTO: 9.27 K/UL (ref 3.9–12.7)

## 2021-07-19 PROCEDURE — 99999 PR PBB SHADOW E&M-EST. PATIENT-LVL IV: CPT | Mod: PBBFAC,,, | Performed by: INTERNAL MEDICINE

## 2021-07-19 PROCEDURE — 99214 OFFICE O/P EST MOD 30 MIN: CPT | Mod: PBBFAC,PN | Performed by: INTERNAL MEDICINE

## 2021-07-19 PROCEDURE — 36415 COLL VENOUS BLD VENIPUNCTURE: CPT | Mod: PN | Performed by: INTERNAL MEDICINE

## 2021-07-19 PROCEDURE — 99213 PR OFFICE/OUTPT VISIT, EST, LEVL III, 20-29 MIN: ICD-10-PCS | Mod: S$PBB,,, | Performed by: INTERNAL MEDICINE

## 2021-07-19 PROCEDURE — 99999 PR PBB SHADOW E&M-EST. PATIENT-LVL IV: ICD-10-PCS | Mod: PBBFAC,,, | Performed by: INTERNAL MEDICINE

## 2021-07-19 PROCEDURE — 99213 OFFICE O/P EST LOW 20 MIN: CPT | Mod: S$PBB,,, | Performed by: INTERNAL MEDICINE

## 2021-07-19 PROCEDURE — 85025 COMPLETE CBC W/AUTO DIFF WBC: CPT | Mod: PN | Performed by: INTERNAL MEDICINE

## 2021-07-19 PROCEDURE — 80053 COMPREHEN METABOLIC PANEL: CPT | Mod: PN | Performed by: SURGERY

## 2021-07-19 PROCEDURE — 83615 LACTATE (LD) (LDH) ENZYME: CPT | Mod: PN | Performed by: INTERNAL MEDICINE

## 2021-10-19 ENCOUNTER — PATIENT MESSAGE (OUTPATIENT)
Dept: HEMATOLOGY/ONCOLOGY | Facility: CLINIC | Age: 51
End: 2021-10-19

## 2021-10-19 ENCOUNTER — OFFICE VISIT (OUTPATIENT)
Dept: HEMATOLOGY/ONCOLOGY | Facility: CLINIC | Age: 51
End: 2021-10-19
Payer: OTHER GOVERNMENT

## 2021-10-19 ENCOUNTER — LAB VISIT (OUTPATIENT)
Dept: LAB | Facility: HOSPITAL | Age: 51
End: 2021-10-19
Attending: INTERNAL MEDICINE
Payer: OTHER GOVERNMENT

## 2021-10-19 VITALS
OXYGEN SATURATION: 97 % | HEIGHT: 70 IN | TEMPERATURE: 98 F | WEIGHT: 269.38 LBS | RESPIRATION RATE: 17 BRPM | DIASTOLIC BLOOD PRESSURE: 88 MMHG | SYSTOLIC BLOOD PRESSURE: 137 MMHG | BODY MASS INDEX: 38.57 KG/M2 | HEART RATE: 102 BPM

## 2021-10-19 DIAGNOSIS — C43.9 MALIGNANT MELANOMA, UNSPECIFIED SITE: Primary | ICD-10-CM

## 2021-10-19 DIAGNOSIS — C43.9 MALIGNANT MELANOMA, UNSPECIFIED SITE: ICD-10-CM

## 2021-10-19 LAB
ALBUMIN SERPL BCP-MCNC: 3.9 G/DL (ref 3.5–5.2)
ALP SERPL-CCNC: 55 U/L (ref 55–135)
ALT SERPL W/O P-5'-P-CCNC: 32 U/L (ref 10–44)
ANION GAP SERPL CALC-SCNC: 7 MMOL/L (ref 8–16)
AST SERPL-CCNC: 39 U/L (ref 10–40)
BASOPHILS # BLD AUTO: 0.1 K/UL (ref 0–0.2)
BASOPHILS NFR BLD: 1 % (ref 0–1.9)
BILIRUB SERPL-MCNC: 0.4 MG/DL (ref 0.1–1)
BUN SERPL-MCNC: 14 MG/DL (ref 6–20)
CALCIUM SERPL-MCNC: 9.4 MG/DL (ref 8.7–10.5)
CHLORIDE SERPL-SCNC: 101 MMOL/L (ref 95–110)
CO2 SERPL-SCNC: 31 MMOL/L (ref 23–29)
CREAT SERPL-MCNC: 1.1 MG/DL (ref 0.5–1.4)
DIFFERENTIAL METHOD: ABNORMAL
EOSINOPHIL # BLD AUTO: 0.3 K/UL (ref 0–0.5)
EOSINOPHIL NFR BLD: 2.7 % (ref 0–8)
ERYTHROCYTE [DISTWIDTH] IN BLOOD BY AUTOMATED COUNT: 15 % (ref 11.5–14.5)
EST. GFR  (AFRICAN AMERICAN): >60 ML/MIN/1.73 M^2
EST. GFR  (NON AFRICAN AMERICAN): >60 ML/MIN/1.73 M^2
GLUCOSE SERPL-MCNC: 89 MG/DL (ref 70–110)
HCT VFR BLD AUTO: 52.9 % (ref 40–54)
HGB BLD-MCNC: 17.8 G/DL (ref 14–18)
IMM GRANULOCYTES # BLD AUTO: 0.04 K/UL (ref 0–0.04)
IMM GRANULOCYTES NFR BLD AUTO: 0.4 % (ref 0–0.5)
LDH SERPL L TO P-CCNC: 278 U/L (ref 110–260)
LYMPHOCYTES # BLD AUTO: 3.5 K/UL (ref 1–4.8)
LYMPHOCYTES NFR BLD: 35.5 % (ref 18–48)
MCH RBC QN AUTO: 28.3 PG (ref 27–31)
MCHC RBC AUTO-ENTMCNC: 33.6 G/DL (ref 32–36)
MCV RBC AUTO: 84 FL (ref 82–98)
MONOCYTES # BLD AUTO: 0.8 K/UL (ref 0.3–1)
MONOCYTES NFR BLD: 8.3 % (ref 4–15)
NEUTROPHILS # BLD AUTO: 5.1 K/UL (ref 1.8–7.7)
NEUTROPHILS NFR BLD: 52.1 % (ref 38–73)
NRBC BLD-RTO: 0 /100 WBC
PLATELET # BLD AUTO: 236 K/UL (ref 150–450)
PMV BLD AUTO: 9.5 FL (ref 9.2–12.9)
POTASSIUM SERPL-SCNC: 4.2 MMOL/L (ref 3.5–5.1)
PROT SERPL-MCNC: 6.9 G/DL (ref 6–8.4)
RBC # BLD AUTO: 6.28 M/UL (ref 4.6–6.2)
SODIUM SERPL-SCNC: 139 MMOL/L (ref 136–145)
WBC # BLD AUTO: 9.71 K/UL (ref 3.9–12.7)

## 2021-10-19 PROCEDURE — 83615 LACTATE (LD) (LDH) ENZYME: CPT | Mod: PN | Performed by: INTERNAL MEDICINE

## 2021-10-19 PROCEDURE — 99999 PR PBB SHADOW E&M-EST. PATIENT-LVL IV: CPT | Mod: PBBFAC,,, | Performed by: INTERNAL MEDICINE

## 2021-10-19 PROCEDURE — 99999 PR PBB SHADOW E&M-EST. PATIENT-LVL IV: ICD-10-PCS | Mod: PBBFAC,,, | Performed by: INTERNAL MEDICINE

## 2021-10-19 PROCEDURE — 80053 COMPREHEN METABOLIC PANEL: CPT | Mod: PN | Performed by: INTERNAL MEDICINE

## 2021-10-19 PROCEDURE — 99213 PR OFFICE/OUTPT VISIT, EST, LEVL III, 20-29 MIN: ICD-10-PCS | Mod: S$PBB,,, | Performed by: INTERNAL MEDICINE

## 2021-10-19 PROCEDURE — 99213 OFFICE O/P EST LOW 20 MIN: CPT | Mod: S$PBB,,, | Performed by: INTERNAL MEDICINE

## 2021-10-19 PROCEDURE — 36415 COLL VENOUS BLD VENIPUNCTURE: CPT | Mod: PN | Performed by: INTERNAL MEDICINE

## 2021-10-19 PROCEDURE — 99214 OFFICE O/P EST MOD 30 MIN: CPT | Mod: PBBFAC,PN | Performed by: INTERNAL MEDICINE

## 2021-10-19 PROCEDURE — 85025 COMPLETE CBC W/AUTO DIFF WBC: CPT | Mod: PN | Performed by: INTERNAL MEDICINE

## 2022-02-28 ENCOUNTER — HOSPITAL ENCOUNTER (OUTPATIENT)
Dept: RADIOLOGY | Facility: HOSPITAL | Age: 52
Discharge: HOME | End: 2022-02-28
Attending: ORTHOPAEDIC SURGERY
Payer: OTHER GOVERNMENT

## 2022-02-28 ENCOUNTER — OFFICE VISIT (OUTPATIENT)
Dept: ORTHOPEDICS | Facility: CLINIC | Age: 52
End: 2022-02-28
Payer: OTHER GOVERNMENT

## 2022-02-28 VITALS — WEIGHT: 269 LBS | HEIGHT: 70 IN | BODY MASS INDEX: 38.51 KG/M2

## 2022-02-28 DIAGNOSIS — M25.559 ACUTE HIP PAIN, UNSPECIFIED LATERALITY: Primary | ICD-10-CM

## 2022-02-28 DIAGNOSIS — M16.0 PRIMARY OSTEOARTHRITIS OF BOTH HIPS: Primary | ICD-10-CM

## 2022-02-28 DIAGNOSIS — M25.559 ACUTE HIP PAIN, UNSPECIFIED LATERALITY: ICD-10-CM

## 2022-02-28 PROCEDURE — 73521 X-RAY EXAM HIPS BI 2 VIEWS: CPT | Mod: 26,,, | Performed by: RADIOLOGY

## 2022-02-28 PROCEDURE — 20611 DRAIN/INJ JOINT/BURSA W/US: CPT | Mod: PBBFAC,PN | Performed by: ORTHOPAEDIC SURGERY

## 2022-02-28 PROCEDURE — 96372 THER/PROPH/DIAG INJ SC/IM: CPT | Mod: 59,,, | Performed by: ORTHOPAEDIC SURGERY

## 2022-02-28 PROCEDURE — 99999 PR PBB SHADOW E&M-EST. PATIENT-LVL III: ICD-10-PCS | Mod: PBBFAC,,, | Performed by: ORTHOPAEDIC SURGERY

## 2022-02-28 PROCEDURE — 73521 XR HIPS BILATERAL 2 VIEW INCL AP PELVIS: ICD-10-PCS | Mod: 26,,, | Performed by: RADIOLOGY

## 2022-02-28 PROCEDURE — 99204 OFFICE O/P NEW MOD 45 MIN: CPT | Mod: 25,S$PBB,, | Performed by: ORTHOPAEDIC SURGERY

## 2022-02-28 PROCEDURE — 99204 PR OFFICE/OUTPT VISIT, NEW, LEVL IV, 45-59 MIN: ICD-10-PCS | Mod: 25,S$PBB,, | Performed by: ORTHOPAEDIC SURGERY

## 2022-02-28 PROCEDURE — 73521 X-RAY EXAM HIPS BI 2 VIEWS: CPT | Mod: TC,PN

## 2022-02-28 PROCEDURE — 96372 PR INJECTION,THERAP/PROPH/DIAG2ST, IM OR SUBCUT: ICD-10-PCS | Mod: 59,,, | Performed by: ORTHOPAEDIC SURGERY

## 2022-02-28 PROCEDURE — 99213 OFFICE O/P EST LOW 20 MIN: CPT | Mod: PBBFAC,PN | Performed by: ORTHOPAEDIC SURGERY

## 2022-02-28 PROCEDURE — 20611 LARGE JOINT ASPIRATION/INJECTION: L HIP JOINT: ICD-10-PCS | Mod: S$PBB,LT,, | Performed by: ORTHOPAEDIC SURGERY

## 2022-02-28 PROCEDURE — 99999 PR PBB SHADOW E&M-EST. PATIENT-LVL III: CPT | Mod: PBBFAC,,, | Performed by: ORTHOPAEDIC SURGERY

## 2022-02-28 RX ORDER — METHYLPREDNISOLONE ACETATE 40 MG/ML
40 INJECTION, SUSPENSION INTRA-ARTICULAR; INTRALESIONAL; INTRAMUSCULAR; SOFT TISSUE
Status: DISCONTINUED | OUTPATIENT
Start: 2022-02-28 | End: 2022-02-28 | Stop reason: HOSPADM

## 2022-02-28 RX ADMIN — METHYLPREDNISOLONE ACETATE 40 MG: 40 INJECTION, SUSPENSION INTRA-ARTICULAR; INTRALESIONAL; INTRAMUSCULAR; SOFT TISSUE at 09:02

## 2022-02-28 NOTE — PROGRESS NOTES
CC:  51-year-old male presents for evaluation of left hip pain.  The patient has pain and his left hip that centers around his left groin.  It started bother him about 6 or 7 weeks ago.  It is worse with activity.  He is having pain especially with getting up and down from a seated position.  At rest she rates the pain as a 3/10 but with activity it is 7/10.  He has tried over-the-counter NSAIDs without much relief.    Past Medical History:   Diagnosis Date    Depression     Eczema     Hypertension     Insomnia     Rosacea     Sleep apnea        Past Surgical History:   Procedure Laterality Date    EXCISION OF MELANOMA N/A 3/19/2021    Procedure: EXCISION, MELANOMA;  Surgeon: Tito Irving MD;  Location: [facility];  Service: General;  Laterality: N/A;  excision of melanoma mid back    KNEE ARTHROSCOPY Left     SHOULDER ARTHROSCOPY Right     ULNAR TUNNEL RELEASE Left        Current Outpatient Medications on File Prior to Visit   Medication Sig Dispense Refill    doxycycline (ORACEA) 40 mg capsule Take 40 mg by mouth once daily.      doxycycline hyclate (DOXYCYCLINE 100MG IN D5W 250ML, READY TO MIX,)       metronidazole 1% (METROGEL) 1 % Gel Apply topically once daily.      multivitamin (THERAGRAN) per tablet Take 1 tablet by mouth.      naproxen sodium (ANAPROX) 220 MG tablet Take 220 mg by mouth.      omega-3 fatty acids/fish oil (FISH OIL-OMEGA-3 FATTY ACIDS) 300-1,000 mg capsule Take by mouth once daily.      prazosin (MINIPRESS) 2 MG Cap Take 1 mg by mouth once daily.      sertraline (ZOLOFT) 25 MG tablet Take 25 mg by mouth once daily.      SODIUM SULFATE MISC by Misc.(Non-Drug; Combo Route) route. topical      traZODone (DESYREL) 50 MG tablet Take 50 mg by mouth every evening.      triamcinolone (KENALOG) 0.5 % ointment Apply topically 2 (two) times daily.      UNABLE TO FIND CHEW 1 TO 2 TABLETS BY MOUTH 30 TO 45 MINUTES BEFORE SEXUAL ACTIVITY AS NEEDED      HYDROcodone-acetaminophen  (NORCO) 5-325 mg per tablet Take 1 tablet by mouth every 6 (six) hours as needed for Pain. (Patient not taking: No sig reported) 10 tablet 0    telmisartan (MICARDIS) 40 MG Tab Take 1 tablet (40 mg total) by mouth once daily. 90 tablet 3     No current facility-administered medications on file prior to visit.       ROS:    Constitution: Denies chills, fever, and sweats.  HENT: Denies headaches or blurry vision.  Cardiovascular: Denies chest pain or irregular heart beat.  Respiratory: Denies cough or shortness of breath.  Gastrointestinal: Denies abdominal pain, nausea, or vomiting.  Genitourinary:  Denies urinary incontinence, bladder and kidney issues  Musculoskeletal:  Denies muscle cramps.  Neurological: Denies dizziness or focal weakness.  Psychiatric/Behavioral: Normal mental status.  Hematologic/Lymphatic: Denies bleeding problem or easy bruising/bleeding.  Skin: Denies rash or suspicious lesions.    Physical examination     Gen - No acute distress, well nourished, well groomed   Eyes - Extraoccular motions intact, pupils equally round and reactive to light and accommodation   ENT - normocephalic, atruamtic, oropharynx clear   Neck - Supple, no abnormal masses   Cardiovascular - regular rate and rhythm   Pulmonary - clear to auscultation bilaterally, no wheezes, ronchi, or rales   Abdomen - soft, non-tender, non-distended, positive bowel sounds   Psych - The patient is alert and oriented x3 with normal mood and affect    Examination of the Left Lower Extremity    Skin is intact throughout  Motor in intact EHL,FHL,TA,lesley  +2 DP/PT  Sensation LT intact D/P/1st    Examination of the Left Hip    C-Sign positive  Logroll positive  Stenchfield positive    Pain with ROM positive    ROM:    Flexion   120  Extension   30  Abduction   45  Adduction   20  External Rotation 45  Internal Rotation 35    Flexion contracture negative    FADIR positive  FADER positive    Tenderness to palpation over lateral and  posterolateral greater tochanter negative    X-ray images were examined and personally interpreted by me.  Three views of bilateral hips dated 02/28/2022 show osteoarthritis of both the left and right hip with pistol  deformity noted of the femoral heads bilaterally.  There is loss of joint space with early periarticular osteophytes and subchondral sclerosis.    Dx:  Bilateral hip osteoarthritis    Plan:  We discussed treatment options.  Recommendation is for an ultrasound guided steroid injection into the Left hip. Ultrasound guidance is needed due to the fact that the hip joint is deep in the body and not palpable. Risk and benefits of the procedure were explained to the patient. They verbalized understanding and did wish to proceed. Informed consent was obtained.  The Left hip was visualized under ultrasound. The femoral head, femoral neck, head-neck junction, and acetabulum were all visualized. The femoral neurovascular bundle was identified and avoided. Under direct ultrasound visualization, an 18 gauge spinal needle was advanced to an appropriate spot at the femoral head neck junction and the Left hip was injected with a mixture of 2/2/1 lidocaine, marcaine, and depomedrol. The hip capsule could be seen to distend as the fluid was injected. The patient tolerated the procedure well.  Static ultrasound images were saved to the patients chart.    Answers for HPI/ROS submitted by the patient on 2/27/2022  unexpected weight change: No  appetite change : No  sleep disturbance: Yes  IMMUNOCOMPROMISED: No  nervous/ anxious: Yes  dysphoric mood: Yes  rash: No  visual disturbance: No  eye redness: No  eye pain: No  ear pain: No  tinnitus: Yes  hearing loss: Yes  sinus pressure : No  nosebleeds: No  enviro allergies: Yes  food allergies: No  cough: No  shortness of breath: No  sweating: No  frequency: No  difficulty urinating: No  hematuria: No  chest pain: No  palpitations: No  nausea: No  vomiting: No  diarrhea:  No  blood in stool: No  constipation: No  headaches: No  dizziness: No  numbness: No  seizures: No  joint swelling: No  myalgia: Yes  weakness: Yes  back pain: No  Pain Chronicity: new  History of trauma: No  Onset: more than 1 month ago  Frequency: constantly  Progression since onset: unchanged  Injury mechanism: pushing  injury location: exercising  pain- numeric: 7/10  pain location: groin, left pelvic  pain quality: sharp  Radiating Pain: Yes  If your pain is radiating, to what part of the body?: groin, left thigh  Aggravating factors: bending, flexion, rotation  fever: No  inability to bear weight: Yes  itching: No  joint locking: Yes  limited range of motion: Yes  stiffness: Yes  tingling: No  Treatments tried: heat, exercise, movement, NSAIDs, OTC pain meds  physical therapy: ineffective  Improvement on treatment: no relief

## 2022-02-28 NOTE — PROCEDURES
Large Joint Aspiration/Injection: L hip joint    Date/Time: 2/28/2022 9:00 AM  Performed by: Thompson Perez II, MD  Authorized by: Thompson Perez II, MD     Consent Done?:  Yes (Verbal)  Indications:  Arthritis and pain  Timeout: prior to procedure the correct patient, procedure, and site was verified    Prep: patient was prepped and draped in usual sterile fashion      Details:  Needle Size:  22 G  Ultrasonic Guidance for needle placement?: Yes    Images are saved and documented.  Approach:  Anterolateral  Location:  Hip  Site:  L hip joint  Medications:  40 mg methylPREDNISolone acetate 40 mg/mL  Patient tolerance:  Patient tolerated the procedure well with no immediate complications

## 2022-05-03 ENCOUNTER — OFFICE VISIT (OUTPATIENT)
Dept: ORTHOPEDICS | Facility: CLINIC | Age: 52
End: 2022-05-03
Payer: OTHER GOVERNMENT

## 2022-05-03 VITALS — HEIGHT: 70 IN | WEIGHT: 269 LBS | RESPIRATION RATE: 18 BRPM | BODY MASS INDEX: 38.51 KG/M2

## 2022-05-03 DIAGNOSIS — M16.0 PRIMARY OSTEOARTHRITIS OF BOTH HIPS: Primary | ICD-10-CM

## 2022-05-03 PROCEDURE — 99213 OFFICE O/P EST LOW 20 MIN: CPT | Mod: PBBFAC,PN | Performed by: ORTHOPAEDIC SURGERY

## 2022-05-03 PROCEDURE — 99999 PR PBB SHADOW E&M-EST. PATIENT-LVL III: CPT | Mod: PBBFAC,,, | Performed by: ORTHOPAEDIC SURGERY

## 2022-05-03 PROCEDURE — 99212 PR OFFICE/OUTPT VISIT, EST, LEVL II, 10-19 MIN: ICD-10-PCS | Mod: S$PBB,,, | Performed by: ORTHOPAEDIC SURGERY

## 2022-05-03 PROCEDURE — 99999 PR PBB SHADOW E&M-EST. PATIENT-LVL III: ICD-10-PCS | Mod: PBBFAC,,, | Performed by: ORTHOPAEDIC SURGERY

## 2022-05-03 PROCEDURE — 99212 OFFICE O/P EST SF 10 MIN: CPT | Mod: S$PBB,,, | Performed by: ORTHOPAEDIC SURGERY

## 2022-05-03 RX ORDER — MELOXICAM 15 MG/1
15 TABLET ORAL DAILY
Qty: 30 TABLET | Refills: 11 | Status: SHIPPED | OUTPATIENT
Start: 2022-05-03 | End: 2023-05-22

## 2022-05-03 NOTE — PROGRESS NOTES
CC:  51-year-old male follows up with osteoarthritis of both of his hips we last saw him in February of this year and he received an ultrasound-guided intra-articular injection in the left hip.  The patient states that he got 45 days worth relief from the injection but has had an insidious return of pain in his left hip over the last week or 2. The pain does localized to his left groin he rates as a 4/10.    Past Medical History:   Diagnosis Date    Depression     Eczema     Hypertension     Insomnia     Rosacea     Sleep apnea        Past Surgical History:   Procedure Laterality Date    EXCISION OF MELANOMA N/A 3/19/2021    Procedure: EXCISION, MELANOMA;  Surgeon: Tito Irving MD;  Location: [facility];  Service: General;  Laterality: N/A;  excision of melanoma mid back    KNEE ARTHROSCOPY Left     SHOULDER ARTHROSCOPY Right     ULNAR TUNNEL RELEASE Left        Current Outpatient Medications on File Prior to Visit   Medication Sig Dispense Refill    doxycycline (ORACEA) 40 mg capsule Take 40 mg by mouth once daily.      doxycycline hyclate (DOXYCYCLINE 100MG IN D5W 250ML, READY TO MIX,)       HYDROcodone-acetaminophen (NORCO) 5-325 mg per tablet Take 1 tablet by mouth every 6 (six) hours as needed for Pain. (Patient not taking: No sig reported) 10 tablet 0    metronidazole 1% (METROGEL) 1 % Gel Apply topically once daily.      multivitamin (THERAGRAN) per tablet Take 1 tablet by mouth.      naproxen sodium (ANAPROX) 220 MG tablet Take 220 mg by mouth.      omega-3 fatty acids/fish oil (FISH OIL-OMEGA-3 FATTY ACIDS) 300-1,000 mg capsule Take by mouth once daily.      prazosin (MINIPRESS) 2 MG Cap Take 1 mg by mouth once daily.      sertraline (ZOLOFT) 25 MG tablet Take 25 mg by mouth once daily.      SODIUM SULFATE MISC by Misc.(Non-Drug; Combo Route) route. topical      telmisartan (MICARDIS) 40 MG Tab Take 1 tablet (40 mg total) by mouth once daily. 90 tablet 3    traZODone (DESYREL) 50  MG tablet Take 50 mg by mouth every evening.      triamcinolone (KENALOG) 0.5 % ointment Apply topically 2 (two) times daily.      UNABLE TO FIND CHEW 1 TO 2 TABLETS BY MOUTH 30 TO 45 MINUTES BEFORE SEXUAL ACTIVITY AS NEEDED       No current facility-administered medications on file prior to visit.       ROS:    Constitution: Denies chills, fever, and sweats.  HENT: Denies headaches or blurry vision.  Cardiovascular: Denies chest pain or irregular heart beat.  Respiratory: Denies cough or shortness of breath.  Gastrointestinal: Denies abdominal pain, nausea, or vomiting.  Genitourinary:  Denies urinary incontinence, bladder and kidney issues  Musculoskeletal:  Denies muscle cramps.  Positive for left hip pain  Neurological: Denies dizziness or focal weakness.  Psychiatric/Behavioral: Normal mental status.  Hematologic/Lymphatic: Denies bleeding problem or easy bruising/bleeding.  Skin: Denies rash or suspicious lesions.    Physical examination     Gen - No acute distress, well nourished, well groomed   Eyes - Extraoccular motions intact, pupils equally round and reactive to light and accommodation   ENT - normocephalic, atruamtic, oropharynx clear   Neck - Supple, no abnormal masses   Cardiovascular - regular rate and rhythm   Pulmonary - clear to auscultation bilaterally, no wheezes, ronchi, or rales   Abdomen - soft, non-tender, non-distended, positive bowel sounds   Psych - The patient is alert and oriented x3 with normal mood and affect    Examination of the Left Lower Extremity    Skin is intact throughout  Motor in intact EHL,FHL,TA,lesley  +2 DP/PT  Sensation LT intact D/P/1st    Examination of the Left Hip    C-Sign positive  Logroll positive  Stenchfield positive    Pain with ROM positive    ROM:    Flexion   120  Extension   30  Abduction   45  Adduction   20  External Rotation 45  Internal Rotation 35    Flexion contracture negative    FADIR positive  FADER positive    Tenderness to palpation over lateral  and posterolateral greater tochanter negative    Examination of the Left Lower Extremity    Skin is intact throughout  Motor in intact EHL,FHL,TA,lesley  +2 DP/PT  Sensation LT intact D/P/1st    Examination of the Left Hip    C-Sign positive  Logroll positive  Stenchfield positive    Pain with ROM positive    ROM:    Flexion   120  Extension   30  Abduction   45  Adduction   20  External Rotation 45  Internal Rotation 35    Flexion contracture negative    FADIR positive  FADER positive    Tenderness to palpation over lateral and posterolateral greater tochanter negative    X-ray images were examined and personally interpreted by me.  Multiple views of bilateral hips dated 02/20/2022 once again reviewed that show osteoarthritis of both the left and right hip with loss of joint space, periarticular osteophytes, and subchondral sclerosis.    Dx:  Bilateral hip osteoarthritis left more symptomatic than right    Plan:  We once again discussed the patient's findings of arthritis in his hips.  He does have evidence of femoroacetabular impingement that went undiagnosed and has resulted in this degenerative disease of his hips.  At this point the best I can offer him is arthritis medication, injections, and when he is ready hip replacement.  The patient states he is not ready for hip replacement surgery at this time.  I send in a prescription for Mobic and he has an appointment at the end of this month follow-up and receive another ultrasound-guided intra-articular injection.  We are also going to refer him to Pain Management to see if they have anything to offer him.

## 2022-05-16 ENCOUNTER — OFFICE VISIT (OUTPATIENT)
Dept: PAIN MEDICINE | Facility: CLINIC | Age: 52
End: 2022-05-16
Payer: OTHER GOVERNMENT

## 2022-05-16 VITALS
WEIGHT: 269 LBS | BODY MASS INDEX: 38.51 KG/M2 | HEART RATE: 90 BPM | SYSTOLIC BLOOD PRESSURE: 186 MMHG | DIASTOLIC BLOOD PRESSURE: 120 MMHG | HEIGHT: 70 IN

## 2022-05-16 DIAGNOSIS — E66.01 CLASS 2 SEVERE OBESITY DUE TO EXCESS CALORIES WITH SERIOUS COMORBIDITY AND BODY MASS INDEX (BMI) OF 37.0 TO 37.9 IN ADULT: ICD-10-CM

## 2022-05-16 DIAGNOSIS — I10 ESSENTIAL HYPERTENSION: ICD-10-CM

## 2022-05-16 DIAGNOSIS — M16.9 OSTEOARTHRITIS OF HIP, UNSPECIFIED LATERALITY, UNSPECIFIED OSTEOARTHRITIS TYPE: Primary | ICD-10-CM

## 2022-05-16 PROCEDURE — 99999 PR PBB SHADOW E&M-EST. PATIENT-LVL III: CPT | Mod: PBBFAC,,, | Performed by: ANESTHESIOLOGY

## 2022-05-16 PROCEDURE — 99999 PR PBB SHADOW E&M-EST. PATIENT-LVL III: ICD-10-PCS | Mod: PBBFAC,,, | Performed by: ANESTHESIOLOGY

## 2022-05-16 PROCEDURE — 99213 OFFICE O/P EST LOW 20 MIN: CPT | Mod: PBBFAC,PN | Performed by: ANESTHESIOLOGY

## 2022-05-16 PROCEDURE — 99204 PR OFFICE/OUTPT VISIT, NEW, LEVL IV, 45-59 MIN: ICD-10-PCS | Mod: S$PBB,,, | Performed by: ANESTHESIOLOGY

## 2022-05-16 PROCEDURE — 99204 OFFICE O/P NEW MOD 45 MIN: CPT | Mod: S$PBB,,, | Performed by: ANESTHESIOLOGY

## 2022-05-16 NOTE — PROGRESS NOTES
This note was completed with dictation software and grammatical errors may exist.    Referring Physician: Thompson Perez II, MD    PCP: Leigh Wade MD      CC: hip pain    HPI:   Tutu Herndon III is a 51 y.o. male referred to us for bilateral hip pain.  Pain has worsened over past 3 months.  Pain is a constant aching, throbbing, shooting his bilateral hips, left greater than right.  Left radiates to his groin.  Pain worsens with prolonged sitting, bending getting up and lying down.  Pain improves with standing and activity.  He has tried physical therapy with minimal benefit.  Been evaluated by orthopedics.  Left hip intra-articular steroid injection under ultrasound provided relief for 45 days.  He is scheduled for repeat injection later this month.  He takes NSAIDs with mild benefits.  He denies any weakness.  No bowel bladder changes.    Of note today, his diastolic blood pressure is below 120. He states following his primary care physician for further blood pressure control.      ROS:  CONSTITUTIONAL: No fevers, chills, night sweats, wt. loss, appetite changes  SKIN: no rashes or itching  ENT: No headaches, head trauma, vision changes, or eye pain  LYMPH NODES: None noticed   CV: No chest pain, palpitations.   RESP: No shortness of breath, dyspnea on exertion, cough, wheezing, or hemoptysis  GI: No nausea, emesis, diarrhea, constipation, melena, hematochezia, pain.    : No dysuria, hematuria, urgency, or frequency   HEME: No easy bruising, bleeding problems  PSYCHIATRIC: No depression, anxiety, psychosis, hallucinations.  NEURO: No seizures, memory loss, dizziness or difficulty sleeping  MSK:  Positive HPI      Past Medical History:   Diagnosis Date    Depression     Eczema     Hypertension     Insomnia     Rosacea     Sleep apnea      Past Surgical History:   Procedure Laterality Date    EXCISION OF MELANOMA N/A 3/19/2021    Procedure: EXCISION, MELANOMA;  Surgeon: Tito Irving MD;   "Location: [facility] OR;  Service: General;  Laterality: N/A;  excision of melanoma mid back    KNEE ARTHROSCOPY Left     SHOULDER ARTHROSCOPY Right     ULNAR TUNNEL RELEASE Left      Family History   Problem Relation Age of Onset    Cancer Mother     Cancer Father     Colon cancer Sister      Social History     Socioeconomic History    Marital status: Unknown   Tobacco Use    Smoking status: Never Smoker    Smokeless tobacco: Former User   Substance and Sexual Activity    Drug use: Never    Sexual activity: Yes     Partners: Female         Medications/Allergies: See med card    Vitals:    05/16/22 1305   BP: (!) 186/120   Pulse: 90   Weight: 122 kg (269 lb)   Height: 5' 10" (1.778 m)   PainSc:   2   PainLoc: Hip         Physical exam:    GENERAL: A and O x3, the patient appears well groomed and is in no acute distress.  Skin: No rashes or obvious lesions  HEENT: normocephalic, atraumatic  CARDIOVASCULAR:  Palpable peripheral pulses  LUNGS: easy work of breathing  ABDOMEN: soft, nontender   UPPER EXTREMITIES: Normal alignment, normal range of motion, no atrophy, no skin changes,  hair growth and nail growth normal and equal bilaterally. No swelling, no tenderness.    LOWER EXTREMITIES:  Normal alignment, normal range of motion, no atrophy, no skin changes,  hair growth and nail growth normal and equal bilaterally. No swelling, no tenderness.  LUMBAR SPINE  Lumbar spine: ROM is full with flexion extension and oblique extension with no increased pain.    Bao's test causes no increased pain on either side.    Supine straight leg raise is negative bilaterally.    Internal and external rotation of the hip causes increased pain on either side, left greater than right  Myofascial exam: No tenderness to palpation across lumbar paraspinous muscles.    MENTAL STATUS: normal orientation, speech, language, and fund of knowledge for social situation.  Emotional state appropriate  MOTOR: Tone and bulk: normal bilateral " upper and lower Strength: normal   SENSATION: Light touch and pinprick intact bilaterally  REFLEXES: normal, symmetric, nonbrisk.  Toes down, no clonus. No hoffmans.  GAIT: normal rise, base, steps, and arm swing.        Imaging:  Xray Hips 2/2022  A fracture of either hip is not seen.  There is joint space narrowing and sclerosis of the acetabulum with eburnation of the edges.  The femoral heads remain intact and round.  The bones of the pelvis are intact.  Sacroiliac joints are sharp and symmetri    Assessment:  Patient presents with bilateral hip pain, left greater than right  1. Osteoarthritis of hip, unspecified laterality, unspecified osteoarthritis type    2. Class 2 severe obesity due to excess calories with serious comorbidity and body mass index (BMI) of 37.0 to 37.9 in adult    3. Essential hypertension          Plan:  1. I have stressed the importance of physical activity and exercise to improve overall health  2. Reviewed pertinent imaging and records with patient  3. Continue evaluation with orthopedics.  He is schedule for repeat intra-articular steroid injection with orthopedics later this month.  4. If above is short-lived, we did discuss diagnostic blocks to the sensory nerves to his left hip.  If successful, we will proceed with radiofrequency ablation.  He will contact us if he wishes to proceed with this procedure.  5. Follow-up as needed.  For    Thank you for referring this interesting patient, and I look forward to continuing to collaborate in his care.

## 2022-05-30 ENCOUNTER — OFFICE VISIT (OUTPATIENT)
Dept: ORTHOPEDICS | Facility: CLINIC | Age: 52
End: 2022-05-30
Payer: OTHER GOVERNMENT

## 2022-05-30 VITALS — BODY MASS INDEX: 38.51 KG/M2 | HEIGHT: 70 IN | WEIGHT: 269 LBS

## 2022-05-30 DIAGNOSIS — M16.0 PRIMARY OSTEOARTHRITIS OF BOTH HIPS: Primary | ICD-10-CM

## 2022-05-30 PROCEDURE — 99212 PR OFFICE/OUTPT VISIT, EST, LEVL II, 10-19 MIN: ICD-10-PCS | Mod: S$PBB,,, | Performed by: ORTHOPAEDIC SURGERY

## 2022-05-30 PROCEDURE — 99213 OFFICE O/P EST LOW 20 MIN: CPT | Mod: PBBFAC,PN | Performed by: ORTHOPAEDIC SURGERY

## 2022-05-30 PROCEDURE — 99999 PR PBB SHADOW E&M-EST. PATIENT-LVL III: CPT | Mod: PBBFAC,,, | Performed by: ORTHOPAEDIC SURGERY

## 2022-05-30 PROCEDURE — 99212 OFFICE O/P EST SF 10 MIN: CPT | Mod: S$PBB,,, | Performed by: ORTHOPAEDIC SURGERY

## 2022-05-30 PROCEDURE — 99999 PR PBB SHADOW E&M-EST. PATIENT-LVL III: ICD-10-PCS | Mod: PBBFAC,,, | Performed by: ORTHOPAEDIC SURGERY

## 2022-05-30 NOTE — PROGRESS NOTES
CC:  52-year-old male follows up with osteoarthritis of bilateral hips.  The patient has femoroacetabular impingement which was never really diagnosed and has gone on to develop osteoarthritis of both hips.  On his last visit we had given him an intra-articular injection in the left hip and then started him on Mobic.  The patient states he has actually done quite well and currently he rates his pain as a 1/10.    Examination of the Right Lower Extremity    Skin is intact throughout  Motor in intact EHL,FHL,TA,lesley  +2 DP/PT  Sensation LT intact D/P/1st    Examination of the Right Hip    C-Sign positive  Logroll negative  Stenchfield negative    Pain with ROM negative    ROM:    Flexion   120  Extension   30  Abduction   45  Adduction   20  External Rotation 45  Internal Rotation 35    Flexion contracture negative    FADIR negative  FADER negative    Tenderness to palpation over lateral and posterolateral greater tochanter negative    Examination of the Left Lower Extremity    Skin is intact throughout  Motor in intact EHL,FHL,TA,lesley  +2 DP/PT  Sensation LT intact D/P/1st    Examination of the Left Hip    C-Sign positive  Logroll negative  Stenchfield negative    Pain with ROM negative    ROM:    Flexion   120  Extension   30  Abduction   45  Adduction   20  External Rotation 45  Internal Rotation 35    Flexion contracture negative    FADIR negative  FADER negative    Tenderness to palpation over lateral and posterolateral greater tochanter negative    X-ray images were examined and personally interpreted by me.  Three views of bilateral hips from 02/28/2022 once again reviewed and there is osteoarthritis of both hips with loss of joint space, periarticular osteophytes, and subchondral sclerosis.    Dx:  Bilateral hip osteoarthritis    Plan:  Had a long discussion with the patient about his treatment.  If his pain is currently 1/10 at which just keep doing what we are doing and hold off on any injections at this  time.  I discussed with the patient that the steroid injections become progressively less affected along the use them.  He verbalized understanding.  He is going to continue his current Mobic regimen and have him follow up in 3 months for re-evaluation.

## 2022-05-31 ENCOUNTER — PATIENT MESSAGE (OUTPATIENT)
Dept: ADMINISTRATIVE | Facility: HOSPITAL | Age: 52
End: 2022-05-31
Payer: OTHER GOVERNMENT

## 2022-07-19 ENCOUNTER — OFFICE VISIT (OUTPATIENT)
Dept: ORTHOPEDICS | Facility: CLINIC | Age: 52
End: 2022-07-19
Payer: OTHER GOVERNMENT

## 2022-07-19 VITALS — RESPIRATION RATE: 18 BRPM | WEIGHT: 269 LBS | BODY MASS INDEX: 38.51 KG/M2 | HEIGHT: 70 IN

## 2022-07-19 DIAGNOSIS — M16.0 PRIMARY OSTEOARTHRITIS OF BOTH HIPS: Primary | ICD-10-CM

## 2022-07-19 PROCEDURE — 99214 OFFICE O/P EST MOD 30 MIN: CPT | Mod: S$PBB,25,, | Performed by: ORTHOPAEDIC SURGERY

## 2022-07-19 PROCEDURE — 99999 PR PBB SHADOW E&M-EST. PATIENT-LVL IV: CPT | Mod: PBBFAC,,, | Performed by: ORTHOPAEDIC SURGERY

## 2022-07-19 PROCEDURE — 99999 PR PBB SHADOW E&M-EST. PATIENT-LVL IV: ICD-10-PCS | Mod: PBBFAC,,, | Performed by: ORTHOPAEDIC SURGERY

## 2022-07-19 PROCEDURE — 20611 LARGE JOINT ASPIRATION/INJECTION: L HIP JOINT: ICD-10-PCS | Mod: S$PBB,LT,, | Performed by: ORTHOPAEDIC SURGERY

## 2022-07-19 PROCEDURE — 20611 DRAIN/INJ JOINT/BURSA W/US: CPT | Mod: PBBFAC,PN | Performed by: ORTHOPAEDIC SURGERY

## 2022-07-19 PROCEDURE — 99214 PR OFFICE/OUTPT VISIT, EST, LEVL IV, 30-39 MIN: ICD-10-PCS | Mod: S$PBB,25,, | Performed by: ORTHOPAEDIC SURGERY

## 2022-07-19 PROCEDURE — 99214 OFFICE O/P EST MOD 30 MIN: CPT | Mod: PBBFAC,PN,25 | Performed by: ORTHOPAEDIC SURGERY

## 2022-07-19 RX ORDER — METHYLPREDNISOLONE ACETATE 80 MG/ML
80 INJECTION, SUSPENSION INTRA-ARTICULAR; INTRALESIONAL; INTRAMUSCULAR; SOFT TISSUE
Status: DISCONTINUED | OUTPATIENT
Start: 2022-07-19 | End: 2022-07-19 | Stop reason: HOSPADM

## 2022-07-19 RX ADMIN — METHYLPREDNISOLONE ACETATE 80 MG: 80 INJECTION, SUSPENSION INTRA-ARTICULAR; INTRALESIONAL; INTRAMUSCULAR; SOFT TISSUE at 08:07

## 2022-07-19 NOTE — PROGRESS NOTES
CC:  52-year-old male follows up with left hip pain.  The patient has a history of osteoarthritis of both of his hips.  He received an ultrasound-guided injection in his left hip back in February.  The patient states he got good relief with that for a couple of months.  He has had a slow insidious return of pain in his left hip over the last month or 2. He currently rates the pain as a 4/10 and he is beginning the limp and having trouble working as a  secondary to the pain in his left hip.    Past Medical History:   Diagnosis Date    Depression     Eczema     Hypertension     Insomnia     Rosacea     Sleep apnea        Past Surgical History:   Procedure Laterality Date    EXCISION OF MELANOMA N/A 3/19/2021    Procedure: EXCISION, MELANOMA;  Surgeon: Tito Irving MD;  Location: [facility];  Service: General;  Laterality: N/A;  excision of melanoma mid back    KNEE ARTHROSCOPY Left     SHOULDER ARTHROSCOPY Right     ULNAR TUNNEL RELEASE Left        Current Outpatient Medications on File Prior to Visit   Medication Sig Dispense Refill    doxycycline (ORACEA) 40 mg capsule Take 40 mg by mouth once daily.      doxycycline hyclate (DOXYCYCLINE 100MG IN D5W 250ML, READY TO MIX,)       HYDROcodone-acetaminophen (NORCO) 5-325 mg per tablet Take 1 tablet by mouth every 6 (six) hours as needed for Pain. 10 tablet 0    meloxicam (MOBIC) 15 MG tablet Take 1 tablet (15 mg total) by mouth once daily. 30 tablet 11    metronidazole 1% (METROGEL) 1 % Gel Apply topically once daily.      multivitamin (THERAGRAN) per tablet Take 1 tablet by mouth.      naproxen sodium (ANAPROX) 220 MG tablet Take 220 mg by mouth.      omega-3 fatty acids/fish oil (FISH OIL-OMEGA-3 FATTY ACIDS) 300-1,000 mg capsule Take by mouth once daily.      prazosin (MINIPRESS) 2 MG Cap Take 1 mg by mouth once daily.      sertraline (ZOLOFT) 25 MG tablet Take 25 mg by mouth once daily.      SODIUM SULFATE MISC by  Misc.(Non-Drug; Combo Route) route. topical      telmisartan (MICARDIS) 40 MG Tab Take 1 tablet (40 mg total) by mouth once daily. 90 tablet 3    traZODone (DESYREL) 50 MG tablet Take 50 mg by mouth every evening.      triamcinolone (KENALOG) 0.5 % ointment Apply topically 2 (two) times daily.      UNABLE TO FIND CHEW 1 TO 2 TABLETS BY MOUTH 30 TO 45 MINUTES BEFORE SEXUAL ACTIVITY AS NEEDED       No current facility-administered medications on file prior to visit.     ROS:    Constitution: Denies chills, fever, and sweats.  HENT: Denies headaches or blurry vision.  Cardiovascular: Denies chest pain or irregular heart beat.  Respiratory: Denies cough or shortness of breath.  Gastrointestinal: Denies abdominal pain, nausea, or vomiting.  Genitourinary:  Denies urinary incontinence, bladder and kidney issues  Musculoskeletal:  Denies muscle cramps.  Positive for left hip pain  Neurological: Denies dizziness or focal weakness.  Psychiatric/Behavioral: Normal mental status.  Hematologic/Lymphatic: Denies bleeding problem or easy bruising/bleeding.  Skin: Denies rash or suspicious lesions.    Physical examination     Gen - No acute distress, well nourished, well groomed   Eyes - Extraoccular motions intact, pupils equally round and reactive to light and accommodation   ENT - normocephalic, atruamtic, oropharynx clear   Neck - Supple, no abnormal masses   Cardiovascular - regular rate and rhythm   Pulmonary - clear to auscultation bilaterally, no wheezes, ronchi, or rales   Abdomen - soft, non-tender, non-distended, positive bowel sounds   Psych - The patient is alert and oriented x3 with normal mood and affect    Examination of the Left Lower Extremity    Skin is intact throughout  Motor in intact EHL,FHL,TA,lesley  +2 DP/PT  Sensation LT intact D/P/1st    Examination of the Left Hip    C-Sign positive  Logroll positive  Stenchfield positive    Pain with ROM positive    ROM:    Flexion   120  Extension   30  Abduction    45  Adduction   20  External Rotation 45  Internal Rotation 35    Flexion contracture positive    FADIR positive  FADER positive    Tenderness to palpation over lateral and posterolateral greater tochanter negative    X-ray images were examined and personally interpreted by me.  Three views of bilateral hips previously done show osteoarthritis of both left and right hip with narrowing of the joint space, periarticular osteophytes, and subchondral sclerosis.    Dx:  Osteoarthritis of the left hip    Plan: Recommendation is for an ultrasound guided steroid injection into the Left hip. Ultrasound guidance is needed due to the fact that the hip joint is deep in the body and not palpable. Risk and benefits of the procedure were explained to the patient. They verbalized understanding and did wish to proceed. Informed consent was obtained.  The Left hip was visualized under ultrasound. The femoral head, femoral neck, head-neck junction, and acetabulum were all visualized. The femoral neurovascular bundle was identified and avoided. Under direct ultrasound visualization, an 18 gauge spinal needle was advanced to an appropriate spot at the femoral head neck junction and the Left hip was injected with a mixture of 2/2/1 lidocaine, marcaine, and depomedrol. The hip capsule could be seen to distend as the fluid was injected. The patient tolerated the procedure well.  Static ultrasound images were saved to the patients chart.

## 2022-07-19 NOTE — PROCEDURES
Large Joint Aspiration/Injection: L hip joint    Date/Time: 7/19/2022 8:45 AM  Performed by: Thompson Perez II, MD  Authorized by: Thompson Perez II, MD     Consent Done?:  Yes (Verbal)  Indications:  Pain and arthritis  Prep: patient was prepped and draped in usual sterile fashion      Details:  Needle Size:  22 G  Ultrasonic Guidance for needle placement?: Yes    Images are saved and documented.  Approach:  Anterolateral  Location:  Hip  Site:  L hip joint  Medications:  80 mg methylPREDNISolone acetate 80 mg/mL  Patient tolerance:  Patient tolerated the procedure well with no immediate complications

## 2022-07-28 ENCOUNTER — TELEPHONE (OUTPATIENT)
Dept: OTOLARYNGOLOGY | Facility: CLINIC | Age: 52
End: 2022-07-28
Payer: OTHER GOVERNMENT

## 2022-07-28 NOTE — TELEPHONE ENCOUNTER
Left message on voicemail regarding virtual visit pt scheduled with Mr. Taylor PA-C. For the concern listed, pt would need to be seen in office. We can change to in-office visit for same time, or reschedule to another day/time that would work for pt. Will also send my chart message. Thanks, Katie

## 2022-07-29 ENCOUNTER — TELEPHONE (OUTPATIENT)
Dept: OTOLARYNGOLOGY | Facility: CLINIC | Age: 52
End: 2022-07-29
Payer: OTHER GOVERNMENT

## 2022-07-29 NOTE — TELEPHONE ENCOUNTER
Attempted to contact pt to discuss virtual appt scheduled on 08/01 w/Taylor DE SOUZA. No answer; left voicemail msg for call back.

## 2022-08-01 ENCOUNTER — OFFICE VISIT (OUTPATIENT)
Dept: OTOLARYNGOLOGY | Facility: CLINIC | Age: 52
End: 2022-08-01
Payer: OTHER GOVERNMENT

## 2022-08-01 VITALS — WEIGHT: 252.88 LBS | HEIGHT: 70 IN | TEMPERATURE: 98 F | BODY MASS INDEX: 36.2 KG/M2

## 2022-08-01 DIAGNOSIS — R49.9 CHANGE IN VOICE: Primary | ICD-10-CM

## 2022-08-01 DIAGNOSIS — R13.10 DYSPHAGIA, UNSPECIFIED TYPE: ICD-10-CM

## 2022-08-01 DIAGNOSIS — K21.9 LPRD (LARYNGOPHARYNGEAL REFLUX DISEASE): ICD-10-CM

## 2022-08-01 PROCEDURE — 99999 PR PBB SHADOW E&M-EST. PATIENT-LVL V: CPT | Mod: PBBFAC,,, | Performed by: PHYSICIAN ASSISTANT

## 2022-08-01 PROCEDURE — 99999 PR PBB SHADOW E&M-EST. PATIENT-LVL V: ICD-10-PCS | Mod: PBBFAC,,, | Performed by: PHYSICIAN ASSISTANT

## 2022-08-01 PROCEDURE — 99215 OFFICE O/P EST HI 40 MIN: CPT | Mod: PBBFAC,PO | Performed by: PHYSICIAN ASSISTANT

## 2022-08-01 PROCEDURE — 99203 PR OFFICE/OUTPT VISIT, NEW, LEVL III, 30-44 MIN: ICD-10-PCS | Mod: S$PBB,,, | Performed by: PHYSICIAN ASSISTANT

## 2022-08-01 PROCEDURE — 99203 OFFICE O/P NEW LOW 30 MIN: CPT | Mod: S$PBB,,, | Performed by: PHYSICIAN ASSISTANT

## 2022-08-01 RX ORDER — FAMOTIDINE 40 MG/1
40 TABLET, FILM COATED ORAL DAILY
Qty: 30 TABLET | Refills: 3 | Status: ON HOLD | OUTPATIENT
Start: 2022-08-01 | End: 2022-11-01

## 2022-08-01 NOTE — PROCEDURES
Laryngoscopy    Date/Time: 8/1/2022 9:15 AM  Performed by: Freddy Vazquez PA-C  Authorized by: Freddy Vazquez PA-C     Consent Done?:  Yes (Verbal)  Anesthesia:     Local anesthetic:  Lidocaine 4% and Laci-Synephrine 1/2%    Patient tolerance:  Patient tolerated the procedure well with no immediate complications    Decongestion performed?: Yes    Laryngoscopy:     Areas examined:  Oropharynx, hypopharynx and larynx (disposable ambu scope)    Laryngoscope size: flexible disposable ambu scope.  Nasopharynx:      Posterior choanae patent  Larynx/hypopharynx:      Post cricoid edema:        Very limited view of pharynx due to pt having hyper gag reflux with stimulation to the nose. Pt would have gag reflux as soon as scope would enter the nose. Repeated attempts made and pt was unable to tolerate. Stimulation in nose would cause gag reflux for pt. Limited view, but there was noted generalized erythema of hypopharynx that was able to be viewed for approximately 1-2 seconds before scope was removed due to pt having hyper gag reflux.

## 2022-08-01 NOTE — PATIENT INSTRUCTIONS
Take pepcid 40mg by mouth 30 minutes to 1 hour prior to eating breakfast. Take gaviscon at night after dinner and prior to bedtime to help create barrier between stomach and esophagus to help with reflux.   ACID REFLUX   What is acid reflux?    When we eat, food passes from the throat and into the stomach through a tube called the esophagus. At the bottom of the esophagus is a ring of muscles that acts as a valve between the esophagus and stomach, called the lower esophageal sphincter. Smoking, alcohol, and certain types of food may weaken the sphincter, so it may stop closing properly. The contents in the stomach then may leak back, or reflux, into the esophagus. This problem is called gastroesophageal reflux disease (GERD). Symptoms of GERD include heartburn, belching, regurgitation of stomach contents, and swallowing difficulties.    Sometimes, the stomach acid travels up through the esophagus and spills into the larynx or pharynx (voice box). This is called laryngopharyngeal reflux (LPR) and is irritating to the vocal folds and surrounding tissues. Often, patients with LPR do not experience heartburn as a symptom. More commonly, symptoms of LPR include hoarseness, excessive mucous resulting in frequent throat clearing, post-nasal drip, coughing, throat soreness or burning, choking episodes, difficulty swallowing, and sensation of a lump in the throat.     How is acid reflux treated?   Treatment for acid reflux can involve any combination of medication, lifestyle modifications, and surgery.   Medications. Your doctor may prescribe a proton pump inhibitor (PPI) or an H2 blocker. If you are prescribed a PPI, take in on an empty stomach in the morning 30 minutes prior to eating breakfast. Keep in mind that it may take 4-6 weeks before symptoms begin to resolve, so do not stop medications without consulting your doctor.   Lifestyle and dietary modifications. Eat smaller meals at a slower pace. Avoid over-eating. If  you are overweight, try to lose weight. Do not lie down or exercise directly after eating; eat your last meal of the day at least 2-3 hours prior to going to sleep. Avoid tight-fitting clothes. If you are a smoker, reduce or quit smoking. Elevate your head of bed 4-6 inches by putting phone books under the legs at the head of your bed or buy a wedge pillow, but do not use more than two regular pillows as this causes the body to curl and compresses your stomach.     Food group Foods to avoid to reduce reflux   Beverages  Whole milk, 2% milk, chocolate milk/hot chocolate, alcohol, coffee (regular and decaf), caffeinated tea, mint tea, carbonated beverages, citrus juice    Breads/grains Commercial sweet rolls, doughnuts, croissants, and other high-fat pastries    Fruits and vegetables Fried or cream-style vegetables, tomatoes, tomato-based products, citrus fruits, hot peppers    Soups and seasonings Cream, cheese, tomato-based soups, vinegar    Meats and proteins Fatty or fried meat/fish, black, sausage, pepperoni, lunch meat, fried eggs    Fats and oil Lard, black drippings, salt pork, meat drippings, gravies, highly seasoned salad dressings, nuts    Sweets/desserts Anything made with or from chocolate, peppermint, spearmint, whole milk, or cream; high-fat pastries, gum, hard candy

## 2022-08-01 NOTE — PROGRESS NOTES
[facility]    Subjective:      Patient: Tutu Herndon III Patient PCP: Leigh Wade MD         :  1970     Sex:  male      MRN:  [id number]          Date of Visit: 2022      Chief Complaint: Voice change  Hoarse (Ongoing for the past 5 weeks. Pt's occupation involves him talking all day; pt's wife states that pt gets really winded when talking. Pt states no h/o reflux or ongoing reflux. Pt has significant family hx of throat cancer. ), Globus Sensation, and Post-Nasal Drip     Patient ID: Tutu Herndon III is a 52 y.o. male who was self-referred for voice change. Pt states that he has had ongoing voice hoarseness for around 5 weeks. Pt states that his voice slowly faded out around 5 weeks ago. Pt denies URI at this time. Pt describes his voice change as gravely and winded. Pt states that his voice worsens with vocal use throughout the day. Pt's wife states that his voice sound 80% better today than it usually sounds. Pt states that he has had chronic PND with throat clearing/cough. Pt denies acid reflux symptoms. Pt states he has had long standing issues with dysphagia-pt states he has trouble eating cold food and medication gets stuck in his throat. Pt denies fever/chills, throat pain, drenching night sweats, unintentional weight loss. Pt states he did not have surgery requiring intubation associated with voice change. Pt denies smoking and denies smoking history.     Pt states that he has right greater than left hearing loss with right sided tinnitus and [facility]. Pt served in  and has h/o loud noise exposure.    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    Review of Systems   HENT: Positive for hearing loss, postnasal drip, trouble swallowing and voice change.    Eyes: Negative.    Respiratory: Positive for cough.    Cardiovascular: Negative.    Gastrointestinal: Negative.    Skin: Negative.    Neurological:  Negative.    Hematological: Negative.    Psychiatric/Behavioral: Positive for decreased concentration and sleep disturbance. The patient is nervous/anxious.    Past Medical History  He has a past medical history of Depression, Eczema, Hypertension, Insomnia, Rosacea, and Sleep apnea.    Family History  His family history includes Cancer in his father and mother; Colon cancer in his sister.    Past Surgical History:   Procedure Laterality Date    EXCISION OF MELANOMA N/A 3/19/2021    Procedure: EXCISION, MELANOMA;  Surgeon: Tito Irving MD;  Location: [facility];  Service: General;  Laterality: N/A;  excision of melanoma mid back    KNEE ARTHROSCOPY Left     SHOULDER ARTHROSCOPY Right     ULNAR TUNNEL RELEASE Left      Social History     Tobacco Use    Smoking status: Never Smoker    Smokeless tobacco: Former User   Substance and Sexual Activity    Alcohol use: Not on file     Comment: no    Drug use: Never    Sexual activity: Yes     Partners: Female     Medications  He has a current medication list which includes the following prescription(s): doxycycline, meloxicam, metronidazole 1%, multivitamin, naproxen sodium, prazosin, sertraline, sodium sulfate, telmisartan, trazodone, triamcinolone, doxycycline hyclate, hydrocodone-acetaminophen, fish oil-omega-3 fatty acids, and UNABLE TO FIND.    Review of patient's allergies indicates:   Allergen Reactions    Lisinopril Other (See Comments)     Cough    Penicillins      Rash      All medications, allergies, and past history have been reviewed.    Objective:      Vitals:  Vitals - 1 value per visit 7/19/2022 8/1/2022 8/1/2022   SYSTOLIC - - -   DIASTOLIC - - -   Pulse - - -   Temp - - 97.7   Resp 18 - -   SPO2 - - -   Weight (lb) 269 - 252.87   Weight (kg) 122.018 - 114.7   Height 70 - 70   BMI (Calculated) 38.6 - 36.3   VISIT REPORT - - -   Pain Score  - 0 -       Body surface area is 2.38 meters squared.  Physical Exam  Constitutional:       General: He  is not in acute distress.     Appearance: Normal appearance. He is not ill-appearing.   HENT:      Head: Normocephalic and atraumatic.      Right Ear: Tympanic membrane, ear canal and external ear normal.      Left Ear: Tympanic membrane, ear canal and external ear normal.      Nose: Nose normal.      Mouth/Throat:      Lips: Pink. No lesions.      Mouth: Mucous membranes are moist. No oral lesions.      Tongue: No lesions.      Palate: No lesions.      Pharynx: Oropharynx is clear. Uvula midline. No pharyngeal swelling, oropharyngeal exudate, posterior oropharyngeal erythema or uvula swelling.   Eyes:      General:         Right eye: No discharge.         Left eye: No discharge.      Extraocular Movements: Extraocular movements intact.      Conjunctiva/sclera: Conjunctivae normal.   Pulmonary:      Effort: Pulmonary effort is normal.   Neurological:      General: No focal deficit present.      Mental Status: He is alert and oriented to person, place, and time. Mental status is at baseline.   Psychiatric:         Mood and Affect: Mood normal.         Behavior: Behavior normal.         Thought Content: Thought content normal.         Judgment: Judgment normal.     Laryngoscopy     Date/Time: 8/1/2022 9:15 AM  Performed by: Freddy Vazquez PA-C  Authorized by: Freddy Vazquez PA-C      Consent Done?:  Yes (Verbal)  Anesthesia:     Local anesthetic:  Lidocaine 4% and Laci-Synephrine 1/2%    Patient tolerance:  Patient tolerated the procedure well with no immediate complications    Decongestion performed?: Yes    Laryngoscopy:     Areas examined:  Oropharynx, hypopharynx and larynx (disposable ambu scope)    Laryngoscope size: flexible disposable ambu scope.  Nasopharynx:      Posterior choanae patent  Larynx/hypopharynx:      Post cricoid edema:        Very limited view of pharynx due to pt having hyper gag reflux with stimulation to the nose. Pt would have gag reflux as soon as scope would enter the nose.  Repeated attempts made and pt was unable to tolerate. Stimulation in nose would cause gag reflux for pt. Limited view, but there was noted generalized erythema of hypopharynx that was able to be viewed for approximately 1-2 seconds before scope was removed due to pt having hyper gag reflux.     Labs:  WBC   Date Value Ref Range Status   10/19/2021 9.71 3.90 - 12.70 K/uL Final     Eosinophil %   Date Value Ref Range Status   10/19/2021 2.7 0.0 - 8.0 % Final     Eos #   Date Value Ref Range Status   10/19/2021 0.3 0.0 - 0.5 K/uL Final     Platelets   Date Value Ref Range Status   10/19/2021 236 150 - 450 K/uL Final     Glucose   Date Value Ref Range Status   10/19/2021 89 70 - 110 mg/dL Final     All lab results, imaging results, and data have been reviewed.    Assessment:        ICD-10-CM ICD-9-CM   1. Change in voice  R49.9 784.49   2. Dysphagia, unspecified type  R13.10 787.20   3. LPRD (laryngopharyngeal reflux disease)  K21.9 478.79            Plan:      Laryngoscopy: Repeated attempts made and pt was unable to tolerate. Stimulation in nose would cause gag reflux for pt. Limited view, but there was noted generalized erythema of hypopharnx that was able to be viewed for approximately 1-2 seconds before scope was removed due to pt having hyper gag reflux. Unable to adequately assess vocal cords or mobility and unable to complete laryngoscopy. Pt advised to take pepcid 40mg by mouth 30 minutes to 1 hour prior to eating breakfast. Take gaviscon at night after dinner and prior to bedtime to help create barrier between stomach and esophagus to help with reflux to help with symptoms of LPR with noted erythema of hypopharynx upon examination. Pt is to undergo modified barium swallow study for dysphagia. Pt is to follow up with Laryngologist Dr. Everette Cullen for stroboscopy to further assess voice change. Pt did not have issues with gag reflux with throat examination today. Pt just had issues with gag reflux with scope  entering nasal cavity. Pt would benefit from stroboscopy and follow up with Dr. Cullen for voice change. Referral placed to speech therapy for voice center audiovisual telemedicine visit to start speech therapy to help with issues of voice change. Pt may follow up in clinic sooner if he has ENT issues/concerns.

## 2022-08-12 ENCOUNTER — TELEPHONE (OUTPATIENT)
Dept: SPEECH THERAPY | Facility: HOSPITAL | Age: 52
End: 2022-08-12
Payer: OTHER GOVERNMENT

## 2022-08-26 ENCOUNTER — TELEPHONE (OUTPATIENT)
Dept: ORTHOPEDICS | Facility: CLINIC | Age: 52
End: 2022-08-26
Payer: OTHER GOVERNMENT

## 2022-08-31 ENCOUNTER — CLINICAL SUPPORT (OUTPATIENT)
Dept: REHABILITATION | Facility: HOSPITAL | Age: 52
End: 2022-08-31
Attending: PHYSICIAN ASSISTANT
Payer: OTHER GOVERNMENT

## 2022-08-31 ENCOUNTER — HOSPITAL ENCOUNTER (OUTPATIENT)
Dept: RADIOLOGY | Facility: HOSPITAL | Age: 52
Discharge: HOME | End: 2022-08-31
Attending: PHYSICIAN ASSISTANT
Payer: OTHER GOVERNMENT

## 2022-08-31 DIAGNOSIS — R13.10 DYSPHAGIA, UNSPECIFIED TYPE: ICD-10-CM

## 2022-08-31 PROCEDURE — 74230 FL MODIFIED BARIUM SWALLOW SPEECH STUDY: ICD-10-PCS | Mod: 26,,, | Performed by: RADIOLOGY

## 2022-08-31 PROCEDURE — 74230 X-RAY XM SWLNG FUNCJ C+: CPT | Mod: TC

## 2022-08-31 PROCEDURE — 92610 EVALUATE SWALLOWING FUNCTION: CPT | Mod: 59,PN

## 2022-08-31 PROCEDURE — 74230 X-RAY XM SWLNG FUNCJ C+: CPT | Mod: 26,,, | Performed by: RADIOLOGY

## 2022-08-31 PROCEDURE — 92611 MOTION FLUOROSCOPY/SWALLOW: CPT | Mod: PN

## 2022-08-31 NOTE — PLAN OF CARE
[facility]  MODIFIED BARIUM SWALLOW STUDY      Date: 8/31/2022     Name: Tutu Herndon III   MRN: [id number]    Therapy Diagnosis: WFL oral and pharyngeal phases of the swallow    Physician: Freddy Vazquez,*  Physician Orders: SLP Video Swallow  Medical Diagnosis from Referral: Dysphagia, unspecified type [R13.10]    Date of Evaluation:  8/31/2022    Time In:  1020  Time Out:  1050  Total Billable Time: 30     Procedure Min.   Swallow and Oral Function Evaluation   15   Fl Modified Barium Swallow Speech  15     Precautions: Standard    Subjective   Date of Onset: approximately 1 year    Past Medical History: Tutu Herndon III  has a past medical history of Depression, Eczema, Hypertension, Insomnia, Rosacea, and Sleep apnea.  Tutu Herndon III  has a past surgical history that includes Shoulder arthroscopy (Right); Ulnar tunnel release (Left); Knee arthroscopy (Left); and Excision of melanoma (N/A, 3/19/2021).    The patient is a 52 y.o. male who complains of difficulty swallowing solids.     History was provided by patient, and/or taken from chart review:   -Current diet at home: full oral - regular/thin liquids  -Recommended diet from previous study: n/a  -Therapy received: n/a  -Neurological: Pt denied any neurological diagnoses.  -Gastroenterologist (GI) : Pt endorsed GI diagnosis of GERD and Reflux. Pt on daily medication, Prilosec per report. Pt denied all other GI diagnoses.   -Pulmonary: Pt denied any pulmonary diagnoses.  and Pt denied a history of aspiration pneumonia within the past 2 years.  -Surgery:   Past Surgical History:   Procedure Laterality Date    EXCISION OF MELANOMA N/A 3/19/2021    Procedure: EXCISION, MELANOMA;  Surgeon: Tito Irving MD;  Location: [facility];  Service: General;  Laterality: N/A;  excision of melanoma mid back    KNEE ARTHROSCOPY Left     SHOULDER ARTHROSCOPY Right     ULNAR TUNNEL RELEASE Left        -Cancer: n/a    The  "following observations were made:   -Mental status: Alert and Cooperative  -Factors affecting performance: no difficulties participating in the study  -Feeding Method: independent in self-feeding    Respiratory Status:   -Respiratory Status: room air    Medical Hx and Allergies:    Review of patient's allergies indicates:   Allergen Reactions    Lisinopril Other (See Comments)     Cough    Penicillins      Rash        Pain Scale:  0/10 on VAS currently.   Pain Location: no pain indicated across study    Objective     Modified Barium Swallow Study  Purpose: to evaluate anatomy and physiology of the oropharyngeal swallow, to determine effectiveness of rehabilitation strategies, and to determine diet consistency and intervention recommendations. The study was performed using the "Gold Standard" of 30 fps with as low as reasonably achievable (ALARA) exposure.     The patient was seen in radiology seated in High Sosa's position in a video imaging chair for lateral views of the larynx and an A/P view. The study was conducted using Varibar thin liquid (IDDSI 0), Varibar nectar liquid (IDDSI 2), Varibar pudding (IDDSI 4), Peaches covered in Varibar powder (IDDSI 6/2), and solid coated in Varibar pudding (IDDSI 7). He tolerated the procedure well.     Cranial Nerve Examination  Cranial Nerve 5: Trigeminal Nerve  Motor Jaw Posture at rest: Closed  Mandible Elevation/Depression: WFL  Mandible lateralization: WFL  Abnormal movement: absent Interpretation: Intact bilaterally    Sensory Forehead: WFL  Cheek: WFL  Jaw: WFL  Facial Pain: None noted Interpretation: Intact bilaterally      Cranial Nerve 7: Facial Nerve  Motor Facial Symmetry: WNL  Wrinkle Forehead: WFL  Close eyes tightly: WFL  Labial Protrusion: WFL  Labial Retraction: WFL  Buccal Strength with Labial Seal: WFL  Abnormal movement: absent Interpretation: Intact bilaterally    Sensory Formal testing not completed.       Cranial Nerves IX and X: Glossopharyngeal and " Vagus Nerves  Motor Palatal Symmetry (Rest): WNL  Palatal Symmetry (Movement): WNL  Cough: Perceptually strong  Voice Prior to PO intake: Clear  Resonance: Normal  Abnormal movement: absent Interpretation: Intact bilaterally      Cranial Nerve XII: Hypoglossal Nerve  Motor Tongue at rest: WNL  Lingual Protrusion: WNL  Lingual Protrusion against Resistance: WNL  Lingual Lateralization: WNL  Abnormal movement: absent Interpretation: Intact bilaterally      Other information:  Volitional Swallow: Able to palpate laryngeal rise  Mucosal Quality: No abnormal findings  Secretion Management: no overt deficits noted/observed  Dentition: Good condition for speech and mastication         CONSISTENCIES ADMINISTERED:    Consistency  Presentation  Findings Rosenbeck's Penetration/Aspiration Scale (PAS) Strategy Attempted    Thin (IDDSI 0) 5ml x2 10 ml x2 Self-regulated cup sip x2 Rapid consecutive cup sip x1 Self-regulated straw sip x2 Rapid consecutive straw sip x1    Views:  - Lateral view  - AP view Oral phase:  trace residue noted on the all consistencies which wassuccessfully reduced by dry swallow    Pharyngeal phase:  trace residue noted in the vallecula which wassuccessfully reduced by dry swallow and no pyriform sinus residue noted    Esophageal screen: WFL Best: (1) Material does not enter the airway    Worst: (1) Material does not enter the airway   No strategies completed or needed   Nectar thick (mildly thick/IDDSI 2) 5ml x2 10 ml x2    Views:  - Lateral view  - AP view Oral phase:  trace residue noted on the all trials which wassuccessfully reduced by dry swallow    Pharyngeal phase: WFL     Esophogeal screen: WFL   Best: (1) Material does not enter the airway    Worst: (1) Material does not enter the airway   No strategies completed or needed   Puree (extremely thick/ IDDSI 4) 5ml x2  10 ml x1    Views:  - Lateral view  - AP view Oral phase: trace residue noted on the all trials which wassuccessfully reduced by  dry swallow    Pharyngeal phase: WFL     Esophageal screen: retrograde flow of bolus below the UES observed and delayed emptying/retention of bolus   Best: (1) Material does not enter the airway    Worst: (1) Material does not enter the airway   No strategies completed or needed   Mixed consistency (thin/ IDDSI 0 + soft and bite sized/ IDDSI 6) - 2 peaches in juice x2    View:  - Lateral view   Oral phase: trace residue noted on the all consistencies which wassuccessfully reduced by dry swallow    Pharyngeal phase: trace residue noted in the vallecula which wassuccessfully reduced by dry swallow and no pyriform sinus residue noted Best: (1) Material does not enter the airway    Worst: (1) Material does not enter the airway   No strategies completed or needed   Solid (regular/ IDDSI 7) - bite of mary cracker x2    View:  - Lateral view  Oral phase: trace residue noted on the all consistencies which wassuccessfully reduced by dry swallow    Pharyngeal phase: trace residue noted in the vallecula which wassuccessfully reduced by dry swallow and no pyriform sinus residue noted Best: (1) Material does not enter the airway    Worst: (1) Material does not enter the airway   No strategies completed or needed       Treatment   Total Treatment Time: n/a  Patient educated regarding results and recommendations of the evaluation. See the recommendations section below.    Education: Plan of Care, role of SLP in care, and anatomy and physiology of swallow mechanism as it relates to MBSS findings and recommendations were discussed with the patient. Patient expressed understanding. All questions were answered.     Assessment     Tutu Herndon III is a 52 y.o. male referred for Modified Barium Swallow Study with a medical diagnosis of unspecified dysphagia. The patient presents with Within Functional Limits swallow function as determined by the Dysphagia Outcome and Severity Scale (ROBBIE). Level 7: Normal in all  situations.    Modified Barium Swallow Study (MBSS) revealed oral phase characterized by lingual and labial strength and range of motion WFL for tongue control, bolus preparation and transport. Lip closure was WFL with no labial escape. Bolus prep and mastication was timely and efficient. Lingual motion was brisk for adequate bolus transport. There was no significant oral residue. The swallow was initiated when the head of the bolus passed the ramus of the mandible.    Pharyngeal phase characterized by timely initiation of swallow. The soft palate elevated for complete closure of the velopharyngeal port. During pharyngeal swallow, base of tongue retraction was reduced, anterior hyoid excursion was reduced. Laryngeal elevation, and pharyngeal stripping wave were WFL resulting in complete epiglottic inversion and UES opening.  No penetration, aspiration, or significant pharyngeal residue was observed.     Esophageal screen characterized by delayed esophageal emptying. and Esophageal screen characterized by suspected dysmotility observed resulting in retrograde flow of bolus through the UES, aerophagia, and delayed esophageal emptying.    Impressions: Patient presents with WFL oral and pharyngeal phases of the swallow. Both swallow safety and swallow efficiency are preserved,. Patient appears to be at low risk for aspiration related pneumonia from a primary oropharyngeal dysphagia in consideration of three pillars of aspiration pneumonia (Yara, 2005) including oral health status, overall health/immune status, and laryngeal vestibule closure/severity of dysphagia. However, unable to assess risk related to aspiration pneumonia cause by the aspiration of gastric content. No further speech therapy indicated at this time.    References:  SHANNAN Livingston (2005, March). Pneumonia: Factors Beyond Aspiration. Perspectives in Swallowing and Swallowing Disorders (Dysphagia), 14, 10-16.    Recommendations:     Consistency  Recommendations:  THIN liquids (IDDSI 0) and REGULAR consistencies (IDDSI 7).   Risk Management: use good oral hygiene , sit upright for all PO intake, increase physical mobility as tolerated, behavioral reflux precautions, alternate bites and sips, small bites and sips, remain upright for at least 1-2 hours following any PO intake, and eat small meals throughout the day to reduce discomfort associated with delayed emptying of the esophagus  Specialist Referrals: GI  Ancillary Tests: Consider N/a .  Therapy: Dysphagia therapy is not recommended at this time.  Follow-up exam: Follow up swallow study is not indicated at this time.    Please contact [facility] at [phone] if there are questions re: the above or if we can be of additional service to this patient.    Therapist's Name:   Josefa Sher CCC-SLP   Speech Language Pathologist  Certified Brain Injury Specialist    Date: 8/31/2022

## 2022-08-31 NOTE — PROGRESS NOTES
See Modified Barium Swallow Study results in Plan of Care.    ANJELICA Berg, CCC-SLP, CBIS  Speech-Language Pathology  8/31/2022

## 2022-09-01 ENCOUNTER — PATIENT MESSAGE (OUTPATIENT)
Dept: HEMATOLOGY/ONCOLOGY | Facility: CLINIC | Age: 52
End: 2022-09-01
Payer: OTHER GOVERNMENT

## 2022-09-01 DIAGNOSIS — R13.19 ESOPHAGEAL DYSPHAGIA: Primary | ICD-10-CM

## 2022-09-08 ENCOUNTER — TELEPHONE (OUTPATIENT)
Dept: OTOLARYNGOLOGY | Facility: CLINIC | Age: 52
End: 2022-09-08
Payer: OTHER GOVERNMENT

## 2022-09-08 NOTE — TELEPHONE ENCOUNTER
Contacted and spoke w/pt. Reviewed following result note w/pt per provider. Pt verbalized understanding and acknowledged. GI appt has been scheduled previously.

## 2022-09-08 NOTE — TELEPHONE ENCOUNTER
----- Message from Freddy Vazquez PA-C sent at 9/1/2022  4:11 PM CDT -----  Regarding: MBSS  Please tell pt that swallow study shows delayed esophageal emptying and that I recommend pt follow up with GI to further assess his dysphagia. Thank you!

## 2022-09-13 ENCOUNTER — OFFICE VISIT (OUTPATIENT)
Dept: GASTROENTEROLOGY | Facility: CLINIC | Age: 52
End: 2022-09-13
Payer: OTHER GOVERNMENT

## 2022-09-13 VITALS
SYSTOLIC BLOOD PRESSURE: 164 MMHG | WEIGHT: 253.06 LBS | DIASTOLIC BLOOD PRESSURE: 105 MMHG | HEART RATE: 79 BPM | BODY MASS INDEX: 36.23 KG/M2 | HEIGHT: 70 IN

## 2022-09-13 DIAGNOSIS — R05.8 PRODUCTIVE COUGH: ICD-10-CM

## 2022-09-13 DIAGNOSIS — I10 ESSENTIAL HYPERTENSION: ICD-10-CM

## 2022-09-13 DIAGNOSIS — Z80.0 FAMILY HISTORY OF ESOPHAGEAL CANCER: ICD-10-CM

## 2022-09-13 DIAGNOSIS — R03.0 ELEVATED BLOOD PRESSURE READING: ICD-10-CM

## 2022-09-13 DIAGNOSIS — Z12.11 SCREENING FOR COLON CANCER: ICD-10-CM

## 2022-09-13 DIAGNOSIS — R13.19 ESOPHAGEAL DYSPHAGIA: ICD-10-CM

## 2022-09-13 DIAGNOSIS — K21.9 GASTROESOPHAGEAL REFLUX DISEASE, UNSPECIFIED WHETHER ESOPHAGITIS PRESENT: Primary | ICD-10-CM

## 2022-09-13 DIAGNOSIS — R12 HEARTBURN: ICD-10-CM

## 2022-09-13 DIAGNOSIS — R68.81 EARLY SATIETY: ICD-10-CM

## 2022-09-13 DIAGNOSIS — Z80.0 FAMILY HISTORY OF COLON CANCER: ICD-10-CM

## 2022-09-13 PROCEDURE — 99204 PR OFFICE/OUTPT VISIT, NEW, LEVL IV, 45-59 MIN: ICD-10-PCS | Mod: S$PBB,,,

## 2022-09-13 PROCEDURE — 99215 OFFICE O/P EST HI 40 MIN: CPT | Mod: PBBFAC,PN

## 2022-09-13 PROCEDURE — 99999 PR PBB SHADOW E&M-EST. PATIENT-LVL V: CPT | Mod: PBBFAC,,,

## 2022-09-13 PROCEDURE — 99204 OFFICE O/P NEW MOD 45 MIN: CPT | Mod: S$PBB,,,

## 2022-09-13 PROCEDURE — 99999 PR PBB SHADOW E&M-EST. PATIENT-LVL V: ICD-10-PCS | Mod: PBBFAC,,,

## 2022-09-13 RX ORDER — CALCIUM CARBONATE 200(500)MG
1 TABLET,CHEWABLE ORAL DAILY
COMMUNITY

## 2022-09-20 ENCOUNTER — HOSPITAL ENCOUNTER (OUTPATIENT)
Facility: HOSPITAL | Age: 52
Discharge: HOME | End: 2022-09-20
Attending: INTERNAL MEDICINE | Admitting: INTERNAL MEDICINE
Payer: OTHER GOVERNMENT

## 2022-09-20 ENCOUNTER — ANESTHESIA EVENT (OUTPATIENT)
Dept: ENDOSCOPY | Facility: HOSPITAL | Age: 52
End: 2022-09-20
Payer: OTHER GOVERNMENT

## 2022-09-20 ENCOUNTER — ANESTHESIA (OUTPATIENT)
Dept: ENDOSCOPY | Facility: HOSPITAL | Age: 52
End: 2022-09-20
Payer: OTHER GOVERNMENT

## 2022-09-20 DIAGNOSIS — R13.10 DYSPHAGIA: Primary | ICD-10-CM

## 2022-09-20 PROCEDURE — 43239 EGD BIOPSY SINGLE/MULTIPLE: CPT | Mod: 59 | Performed by: INTERNAL MEDICINE

## 2022-09-20 PROCEDURE — 37000008 HC ANESTHESIA 1ST 15 MINUTES: Performed by: INTERNAL MEDICINE

## 2022-09-20 PROCEDURE — 00731 ANES UPR GI NDSC PX NOS: CPT | Performed by: INTERNAL MEDICINE

## 2022-09-20 PROCEDURE — 43248 PR EGD, FLEX, W/DILATION OVER GUIDEWIRE: ICD-10-PCS | Mod: ,,, | Performed by: INTERNAL MEDICINE

## 2022-09-20 PROCEDURE — 63600175 PHARM REV CODE 636 W HCPCS: Performed by: NURSE ANESTHETIST, CERTIFIED REGISTERED

## 2022-09-20 PROCEDURE — 88341 PR IHC OR ICC EACH ADD'L SINGLE ANTIBODY  STAINPR: ICD-10-PCS | Mod: 26,,, | Performed by: PATHOLOGY

## 2022-09-20 PROCEDURE — 25000003 PHARM REV CODE 250: Performed by: INTERNAL MEDICINE

## 2022-09-20 PROCEDURE — D9220A PRA ANESTHESIA: ICD-10-PCS | Mod: ANES,,, | Performed by: ANESTHESIOLOGY

## 2022-09-20 PROCEDURE — 88305 TISSUE EXAM BY PATHOLOGIST: CPT | Performed by: PATHOLOGY

## 2022-09-20 PROCEDURE — 27201012 HC FORCEPS, HOT/COLD, DISP: Performed by: INTERNAL MEDICINE

## 2022-09-20 PROCEDURE — 88305 TISSUE EXAM BY PATHOLOGIST: ICD-10-PCS | Mod: 26,,, | Performed by: PATHOLOGY

## 2022-09-20 PROCEDURE — 88342 IMHCHEM/IMCYTCHM 1ST ANTB: CPT | Mod: 59 | Performed by: PATHOLOGY

## 2022-09-20 PROCEDURE — 88342 IMHCHEM/IMCYTCHM 1ST ANTB: CPT | Mod: 26,,, | Performed by: PATHOLOGY

## 2022-09-20 PROCEDURE — D9220A PRA ANESTHESIA: ICD-10-PCS | Mod: CRNA,,, | Performed by: NURSE ANESTHETIST, CERTIFIED REGISTERED

## 2022-09-20 PROCEDURE — 43248 EGD GUIDE WIRE INSERTION: CPT | Mod: ,,, | Performed by: INTERNAL MEDICINE

## 2022-09-20 PROCEDURE — D9220A PRA ANESTHESIA: Mod: ANES,,, | Performed by: ANESTHESIOLOGY

## 2022-09-20 PROCEDURE — 88342 CHG IMMUNOCYTOCHEMISTRY: ICD-10-PCS | Mod: 26,,, | Performed by: PATHOLOGY

## 2022-09-20 PROCEDURE — 43239 EGD BIOPSY SINGLE/MULTIPLE: CPT | Mod: 59,,, | Performed by: INTERNAL MEDICINE

## 2022-09-20 PROCEDURE — 88341 IMHCHEM/IMCYTCHM EA ADD ANTB: CPT | Performed by: PATHOLOGY

## 2022-09-20 PROCEDURE — 43248 EGD GUIDE WIRE INSERTION: CPT | Performed by: INTERNAL MEDICINE

## 2022-09-20 PROCEDURE — 43239 PR EGD, FLEX, W/BIOPSY, SGL/MULTI: ICD-10-PCS | Mod: 59,,, | Performed by: INTERNAL MEDICINE

## 2022-09-20 PROCEDURE — 25000003 PHARM REV CODE 250: Performed by: NURSE ANESTHETIST, CERTIFIED REGISTERED

## 2022-09-20 PROCEDURE — D9220A PRA ANESTHESIA: Mod: CRNA,,, | Performed by: NURSE ANESTHETIST, CERTIFIED REGISTERED

## 2022-09-20 PROCEDURE — 37000009 HC ANESTHESIA EA ADD 15 MINS: Performed by: INTERNAL MEDICINE

## 2022-09-20 PROCEDURE — 88305 TISSUE EXAM BY PATHOLOGIST: CPT | Mod: 26,,, | Performed by: PATHOLOGY

## 2022-09-20 PROCEDURE — 88341 IMHCHEM/IMCYTCHM EA ADD ANTB: CPT | Mod: 26,,, | Performed by: PATHOLOGY

## 2022-09-20 PROCEDURE — C1769 GUIDE WIRE: HCPCS | Performed by: INTERNAL MEDICINE

## 2022-09-20 RX ORDER — PANTOPRAZOLE SODIUM 40 MG/1
40 TABLET, DELAYED RELEASE ORAL 2 TIMES DAILY
Qty: 60 TABLET | Refills: 3 | Status: SHIPPED | OUTPATIENT
Start: 2022-09-20 | End: 2022-12-22

## 2022-09-20 RX ORDER — SODIUM CHLORIDE 9 MG/ML
INJECTION, SOLUTION INTRAVENOUS CONTINUOUS
Status: DISCONTINUED | OUTPATIENT
Start: 2022-09-20 | End: 2022-09-20 | Stop reason: HOSPADM

## 2022-09-20 RX ORDER — LIDOCAINE HYDROCHLORIDE 20 MG/ML
INJECTION INTRAVENOUS
Status: DISCONTINUED | OUTPATIENT
Start: 2022-09-20 | End: 2022-09-20

## 2022-09-20 RX ORDER — PROPOFOL 10 MG/ML
VIAL (ML) INTRAVENOUS
Status: DISCONTINUED | OUTPATIENT
Start: 2022-09-20 | End: 2022-09-20

## 2022-09-20 RX ADMIN — SODIUM CHLORIDE: 0.9 INJECTION, SOLUTION INTRAVENOUS at 11:09

## 2022-09-20 RX ADMIN — PROPOFOL 50 MG: 10 INJECTION, EMULSION INTRAVENOUS at 11:09

## 2022-09-20 RX ADMIN — LIDOCAINE HYDROCHLORIDE 100 MG: 20 INJECTION, SOLUTION INTRAVENOUS at 11:09

## 2022-09-20 RX ADMIN — PROPOFOL 50 MG: 10 INJECTION, EMULSION INTRAVENOUS at 12:09

## 2022-09-20 RX ADMIN — PROPOFOL 100 MG: 10 INJECTION, EMULSION INTRAVENOUS at 11:09

## 2022-09-20 NOTE — [employer]ATION PATIENT INSTRUCTIONS
Discharge Summary/Instructions after an Endoscopic Procedure  Patient Name: Tutu Herndon  Patient MRN: [id number]  Patient YOB: 1970 Tuesday, September 20, 2022  Kyle Choi MD  Dear patient,  As a result of recent federal legislation (The Federal Cures Act), you may   receive lab or pathology results from your procedure in your [facility]   account before your physician is able to contact you. Your physician or   their representative will relay the results to you with their   recommendations at their soonest availability.  Thank you,  RESTRICTIONS:  During your procedure today, you received medications for sedation.  These   medications may affect your judgment, balance and coordination.  Therefore,   for 24 hours, you have the following restrictions:   - DO NOT drive a car, operate machinery, make legal/financial decisions,   sign important papers or drink alcohol.    ACTIVITY:  Today: no heavy lifting, straining or running due to procedural   sedation/anesthesia.  The following day: return to full activity including work.  DIET:  Eat and drink normally unless instructed otherwise.     TREATMENT FOR COMMON SIDE EFFECTS:  - Mild abdominal pain, nausea, belching, bloating or excessive gas:  rest,   eat lightly and use a heating pad.  - Sore Throat: treat with throat lozenges and/or gargle with warm salt   water.  - Because air was used during the procedure, expelling large amounts of air   from your rectum or belching is normal.  - If a bowel prep was taken, you may not have a bowel movement for 1-3 days.    This is normal.  SYMPTOMS TO WATCH FOR AND REPORT TO YOUR PHYSICIAN:  1. Abdominal pain or bloating, other than gas cramps.  2. Chest pain.  3. Back pain.  4. Signs of infection such as: chills or fever occurring within 24 hours   after the procedure.  5. Rectal bleeding, which would show as bright red, maroon, or black stools.   (A tablespoon of blood from the rectum is not serious, especially  if   hemorrhoids are present.)  6. Vomiting.  7. Weakness or dizziness.  GO DIRECTLY TO THE NEAREST EMERGENCY ROOM IF YOU HAVE ANY OF THE FOLLOWING:      Difficulty breathing              Chills and/or fever over 101 F   Persistent vomiting and/or vomiting blood   Severe abdominal pain   Severe chest pain   Black, tarry stools   Bleeding- more than one tablespoon   Any other symptom or condition that you feel may need urgent attention  Your doctor recommends these additional instructions:  If any biopsies were taken, your doctors clinic will contact you in 1 to 2   weeks with any results.  - Discharge patient to home.   - Advance diet as tolerated.   - Continue present medications, including PPI BID for 8 weeks then daily   thereafter  - If symptoms persist can consider repeat EGD in 8 weeks to ensure healing  - Await pathology results.  For questions, problems or results please call your physician - Kyle Choi MD at Work:  [phone].  [facility], EMERGENCY ROOM PHONE NUMBER: [phone]  IF A COMPLICATION OR EMERGENCY SITUATION ARISES AND YOU ARE UNABLE TO REACH   YOUR PHYSICIAN - GO DIRECTLY TO THE EMERGENCY ROOM.  Kyle Choi MD  9/20/2022 12:16:34 PM  This report has been verified and signed electronically.  Dear patient,  As a result of recent federal legislation (The Federal Cures Act), you may   receive lab or pathology results from your procedure in your [facility]   account before your physician is able to contact you. Your physician or   their representative will relay the results to you with their   recommendations at their soonest availability.  Thank you,  PROVATION

## 2022-09-20 NOTE — ANESTHESIA PREPROCEDURE EVALUATION
09/20/2022  Tutu Herndon III is a 52 y.o., male.      Pre-op Assessment    I have reviewed the Patient Summary Reports.     I have reviewed the Nursing Notes. I have reviewed the NPO Status.   I have reviewed the Medications.     Review of Systems  Anesthesia Hx:  Denies Family Hx of Anesthesia complications.   Denies Personal Hx of Anesthesia complications.   Cardiovascular:   Hypertension    Pulmonary:   Sleep Apnea, CPAP    Hepatic/GI:   GERD    Endocrine:  Obesity / BMI > 30  Psych:   Psychiatric History          Physical Exam  General: Cooperative, Alert and Oriented    Airway:  Mallampati: IV / III  Mouth Opening: Normal  TM Distance: Normal  Tongue: Normal  Neck ROM: Normal ROM  Neck: Girth Increased        Anesthesia Plan  Type of Anesthesia, risks & benefits discussed:    Anesthesia Type: Gen Natural Airway  Intra-op Monitoring Plan: Standard ASA Monitors  Induction:  IV  Informed Consent: Informed consent signed with the Patient and all parties understand the risks and agree with anesthesia plan.  All questions answered.   ASA Score: 3    Ready For Surgery From Anesthesia Perspective.     .

## 2022-09-20 NOTE — TRANSFER OF CARE
Anesthesia Transfer of Care Note    Patient: Tutu Herndon III    Procedure(s) Performed: Procedure(s) (LRB):  EGD (ESOPHAGOGASTRODUODENOSCOPY) (N/A)    Patient location: GI    Anesthesia Type: general    Transport from OR: Transported from OR on 2-3 L/min O2 by NC with adequate spontaneous ventilation    Post pain: adequate analgesia    Post assessment: no apparent anesthetic complications    Post vital signs: stable    Level of consciousness: sedated    Nausea/Vomiting: no nausea/vomiting    Complications: none    Transfer of care protocol was followed      Last vitals:   Visit Vitals  /89 (BP Location: Left arm, Patient Position: Lying)   Pulse 90   Temp 36.7 °C (98.1 °F) (Skin)   Resp 16   SpO2 99%

## 2022-09-20 NOTE — ANESTHESIA POSTPROCEDURE EVALUATION
Anesthesia Post Evaluation    Patient: Tutu Herndon III    Procedure(s) Performed: Procedure(s) (LRB):  EGD (ESOPHAGOGASTRODUODENOSCOPY) (N/A)    Final Anesthesia Type: general      Patient location during evaluation: PACU  Patient participation: Yes- Able to Participate  Level of consciousness: awake and alert  Post-procedure vital signs: reviewed and stable  Pain management: adequate  Airway patency: patent    PONV status at discharge: No PONV  Anesthetic complications: no      Cardiovascular status: blood pressure returned to baseline  Respiratory status: unassisted  Hydration status: euvolemic  Follow-up not needed.          Vitals Value Taken Time   /61 09/20/22 1225   Temp na 09/20/22 1243   Pulse 87 09/20/22 1225   Resp 16 09/20/22 1225   SpO2 96 % 09/20/22 1225         Event Time   Out of Recovery 12:40:04         Pain/Iglesia Score: Iglesia Score: 10 (9/20/2022 12:25 PM)

## 2022-09-20 NOTE — H&P
History & Physical - Short Stay  Gastroenterology      SUBJECTIVE:     Procedure: EGD    Chief Complaint/Indication for Procedure: Dysphagia and Reflux    PTA Medications   Medication Sig    famotidine (PEPCID) 40 MG tablet Take 1 tablet (40 mg total) by mouth once daily.    meloxicam (MOBIC) 15 MG tablet Take 1 tablet (15 mg total) by mouth once daily.    multivitamin (THERAGRAN) per tablet Take 1 tablet by mouth.    omega-3 fatty acids/fish oil (FISH OIL-OMEGA-3 FATTY ACIDS) 300-1,000 mg capsule Take by mouth once daily.    sertraline (ZOLOFT) 25 MG tablet Take 25 mg by mouth once daily.    telmisartan (MICARDIS) 40 MG Tab Take 1 tablet (40 mg total) by mouth once daily.    traZODone (DESYREL) 50 MG tablet Take 50 mg by mouth every evening.    calcium carbonate (TUMS) 200 mg calcium (500 mg) chewable tablet Take 1 tablet by mouth once daily.    doxycycline (ORACEA) 40 mg capsule Take 40 mg by mouth once daily.    mag/aluminum/sod bicarb/alginc (GAVISCON ORAL) Take by mouth every evening.    metronidazole 1% (METROGEL) 1 % Gel Apply topically once daily.    naproxen sodium (ANAPROX) 220 MG tablet Take 220 mg by mouth.    prazosin (MINIPRESS) 2 MG Cap Take 1 mg by mouth once daily.    SODIUM SULFATE MISC by Misc.(Non-Drug; Combo Route) route. topical    triamcinolone (KENALOG) 0.5 % ointment Apply topically 2 (two) times daily.       Review of patient's allergies indicates:   Allergen Reactions    Lisinopril Other (See Comments)     Cough    Penicillins      Rash         Past Medical History:   Diagnosis Date    Depression     Eczema     Hypertension     Insomnia     Rosacea     Sleep apnea      Past Surgical History:   Procedure Laterality Date    EXCISION OF MELANOMA N/A 03/19/2021    Procedure: EXCISION, MELANOMA;  Surgeon: Tito Irving MD;  Location: [facility];  Service: General;  Laterality: N/A;  excision of melanoma mid back    KNEE ARTHROSCOPY Left     SHOULDER ARTHROSCOPY Right     ULNAR TUNNEL RELEASE  Left      Family History   Problem Relation Age of Onset    Cancer Mother     Esophageal cancer Father     Cancer Father     Colon cancer Sister 46    Esophageal cancer Brother     Stomach cancer Neg Hx      Social History     Tobacco Use    Smoking status: Never    Smokeless tobacco: Former   Substance Use Topics    Alcohol use: Yes     Comment: rare    Drug use: Never         OBJECTIVE:     Vital Signs (Most Recent)  Temp: 98.1 °F (36.7 °C) (09/20/22 1101)  Pulse: 90 (09/20/22 1101)  Resp: 16 (09/20/22 1101)  BP: 134/89 (09/20/22 1101)  SpO2: 99 % (09/20/22 1101)    Physical Exam:                                                       GENERAL:  Comfortable, in no acute distress.                                 HEENT EXAM:  Nonicteric.  No adenopathy.  Oropharynx is clear.               NECK:  Supple.                                                               LUNGS:  Clear.                                                               CARDIAC:  Regular rate and rhythm.  S1, S2.  No murmur.                      ABDOMEN:  Soft, positive bowel sounds, nontender.  No hepatosplenomegaly or masses.  No rebound or guarding.                                             EXTREMITIES:  No edema.     MENTAL STATUS:  Normal, alert and oriented.      ASSESSMENT/PLAN:     Assessment: Dysphagia and Reflux    Plan: EGD

## 2022-09-21 VITALS
DIASTOLIC BLOOD PRESSURE: 61 MMHG | TEMPERATURE: 98 F | HEART RATE: 87 BPM | SYSTOLIC BLOOD PRESSURE: 100 MMHG | RESPIRATION RATE: 16 BRPM | OXYGEN SATURATION: 96 %

## 2022-09-23 LAB
FINAL PATHOLOGIC DIAGNOSIS: NORMAL
GROSS: NORMAL
Lab: NORMAL

## 2022-10-04 ENCOUNTER — TELEPHONE (OUTPATIENT)
Dept: GASTROENTEROLOGY | Facility: CLINIC | Age: 52
End: 2022-10-04
Payer: OTHER GOVERNMENT

## 2022-10-04 ENCOUNTER — ANESTHESIA EVENT (OUTPATIENT)
Dept: ENDOSCOPY | Facility: HOSPITAL | Age: 52
End: 2022-10-04
Payer: OTHER GOVERNMENT

## 2022-10-04 ENCOUNTER — HOSPITAL ENCOUNTER (OUTPATIENT)
Facility: HOSPITAL | Age: 52
Discharge: HOME | End: 2022-10-04
Attending: INTERNAL MEDICINE | Admitting: INTERNAL MEDICINE
Payer: OTHER GOVERNMENT

## 2022-10-04 ENCOUNTER — ANESTHESIA (OUTPATIENT)
Dept: ENDOSCOPY | Facility: HOSPITAL | Age: 52
End: 2022-10-04
Payer: OTHER GOVERNMENT

## 2022-10-04 DIAGNOSIS — Z12.11 COLON CANCER SCREENING: Primary | ICD-10-CM

## 2022-10-04 PROCEDURE — G0105 COLORECTAL SCRN; HI RISK IND: HCPCS | Mod: 53,,, | Performed by: INTERNAL MEDICINE

## 2022-10-04 PROCEDURE — 25000003 PHARM REV CODE 250: Performed by: NURSE ANESTHETIST, CERTIFIED REGISTERED

## 2022-10-04 PROCEDURE — 37000008 HC ANESTHESIA 1ST 15 MINUTES: Performed by: INTERNAL MEDICINE

## 2022-10-04 PROCEDURE — D9220A PRA ANESTHESIA: ICD-10-PCS | Mod: ANES,,, | Performed by: ANESTHESIOLOGY

## 2022-10-04 PROCEDURE — 00812 ANES LWR INTST SCR COLSC: CPT | Performed by: INTERNAL MEDICINE

## 2022-10-04 PROCEDURE — G0105 COLORECTAL SCRN; HI RISK IND: ICD-10-PCS | Mod: 53,,, | Performed by: INTERNAL MEDICINE

## 2022-10-04 PROCEDURE — D9220A PRA ANESTHESIA: Mod: ANES,,, | Performed by: ANESTHESIOLOGY

## 2022-10-04 PROCEDURE — 37000009 HC ANESTHESIA EA ADD 15 MINS: Performed by: INTERNAL MEDICINE

## 2022-10-04 PROCEDURE — 25000003 PHARM REV CODE 250: Performed by: INTERNAL MEDICINE

## 2022-10-04 PROCEDURE — D9220A PRA ANESTHESIA: ICD-10-PCS | Mod: CRNA,,, | Performed by: NURSE ANESTHETIST, CERTIFIED REGISTERED

## 2022-10-04 PROCEDURE — G0105 COLORECTAL SCRN; HI RISK IND: HCPCS | Mod: 74 | Performed by: INTERNAL MEDICINE

## 2022-10-04 PROCEDURE — 63600175 PHARM REV CODE 636 W HCPCS: Performed by: NURSE ANESTHETIST, CERTIFIED REGISTERED

## 2022-10-04 PROCEDURE — D9220A PRA ANESTHESIA: Mod: CRNA,,, | Performed by: NURSE ANESTHETIST, CERTIFIED REGISTERED

## 2022-10-04 RX ORDER — SODIUM CHLORIDE 9 MG/ML
INJECTION, SOLUTION INTRAVENOUS CONTINUOUS
Status: DISCONTINUED | OUTPATIENT
Start: 2022-10-04 | End: 2022-10-04 | Stop reason: HOSPADM

## 2022-10-04 RX ORDER — LIDOCAINE HCL/PF 100 MG/5ML
SYRINGE (ML) INTRAVENOUS
Status: DISCONTINUED | OUTPATIENT
Start: 2022-10-04 | End: 2022-10-04

## 2022-10-04 RX ORDER — PROPOFOL 10 MG/ML
VIAL (ML) INTRAVENOUS
Status: DISCONTINUED | OUTPATIENT
Start: 2022-10-04 | End: 2022-10-04

## 2022-10-04 RX ADMIN — PROPOFOL 25 MG: 10 INJECTION, EMULSION INTRAVENOUS at 01:10

## 2022-10-04 RX ADMIN — PROPOFOL 100 MG: 10 INJECTION, EMULSION INTRAVENOUS at 01:10

## 2022-10-04 RX ADMIN — LIDOCAINE HYDROCHLORIDE 50 MG: 20 INJECTION INTRAVENOUS at 01:10

## 2022-10-04 RX ADMIN — SODIUM CHLORIDE: 0.9 INJECTION, SOLUTION INTRAVENOUS at 11:10

## 2022-10-04 NOTE — TELEPHONE ENCOUNTER
----- Message from Kyle Choi MD sent at 10/4/2022  1:23 PM CDT -----  Please reschedule patient with golytely prep and possible two day prep

## 2022-10-04 NOTE — H&P
History & Physical - Short Stay  Gastroenterology  SUBJECTIVE:     Procedure: Colonoscopy    Chief Complaint/Indication for Procedure: Screening, family history of colon cancer in sister age (46)    PTA Medications   Medication Sig    doxycycline (ORACEA) 40 mg capsule Take 40 mg by mouth once daily.    mag/aluminum/sod bicarb/alginc (GAVISCON ORAL) Take by mouth every evening.    meloxicam (MOBIC) 15 MG tablet Take 1 tablet (15 mg total) by mouth once daily.    multivitamin (THERAGRAN) per tablet Take 1 tablet by mouth.    omega-3 fatty acids/fish oil (FISH OIL-OMEGA-3 FATTY ACIDS) 300-1,000 mg capsule Take by mouth once daily.    pantoprazole (PROTONIX) 40 MG tablet Take 1 tablet (40 mg total) by mouth 2 (two) times daily.    prazosin (MINIPRESS) 2 MG Cap Take 1 mg by mouth once daily.    sertraline (ZOLOFT) 25 MG tablet Take 25 mg by mouth once daily.    telmisartan (MICARDIS) 40 MG Tab Take 1 tablet (40 mg total) by mouth once daily.    traZODone (DESYREL) 50 MG tablet Take 50 mg by mouth every evening.    calcium carbonate (TUMS) 200 mg calcium (500 mg) chewable tablet Take 1 tablet by mouth once daily.    famotidine (PEPCID) 40 MG tablet Take 1 tablet (40 mg total) by mouth once daily.    metronidazole 1% (METROGEL) 1 % Gel Apply topically once daily.    naproxen sodium (ANAPROX) 220 MG tablet Take 220 mg by mouth.    SODIUM SULFATE MISC by Misc.(Non-Drug; Combo Route) route. topical    triamcinolone (KENALOG) 0.5 % ointment Apply topically 2 (two) times daily.     Review of patient's allergies indicates:   Allergen Reactions    Lisinopril Other (See Comments)     Cough    Penicillins      Rash       Past Medical History:   Diagnosis Date    Depression     Eczema     Hypertension     Insomnia     Rosacea     Sleep apnea      Past Surgical History:   Procedure Laterality Date    ESOPHAGOGASTRODUODENOSCOPY N/A 9/20/2022    Procedure: EGD (ESOPHAGOGASTRODUODENOSCOPY);  Surgeon: Kyle Choi MD;  Location:  [facility];  Service: Endoscopy;  Laterality: N/A;    EXCISION OF MELANOMA N/A 03/19/2021    Procedure: EXCISION, MELANOMA;  Surgeon: Tito Irving MD;  Location: [facility] OR;  Service: General;  Laterality: N/A;  excision of melanoma mid back    KNEE ARTHROSCOPY Left     SHOULDER ARTHROSCOPY Right     ULNAR TUNNEL RELEASE Left      Family History   Problem Relation Age of Onset    Cancer Mother     Esophageal cancer Father     Cancer Father     Colon cancer Sister 46    Esophageal cancer Brother     Stomach cancer Neg Hx      Social History     Tobacco Use    Smoking status: Never    Smokeless tobacco: Never   Substance Use Topics    Alcohol use: Yes     Comment: rare    Drug use: Never     OBJECTIVE:     Vital Signs (Most Recent)  Temp: 98.1 °F (36.7 °C) (10/04/22 1119)  Pulse: 82 (10/04/22 1119)  Resp: 16 (10/04/22 1119)  BP: 133/81 (10/04/22 1119)  SpO2: 95 % (10/04/22 1119)    Physical Exam:                                                       GENERAL:  Comfortable, in no acute distress.                                 HEENT EXAM:  Nonicteric.  No adenopathy.  Oropharynx is clear.               NECK:  Supple.                                                               LUNGS:  Clear.                                                               CARDIAC:  Regular rate and rhythm.  S1, S2.  No murmur.                      ABDOMEN:  Soft, positive bowel sounds, nontender.  No hepatosplenomegaly or masses.  No rebound or guarding.                                             EXTREMITIES:  No edema.     MENTAL STATUS:  Normal, alert and oriented.    ASSESSMENT/PLAN:     Assessment: Screening, family history of colon cancer in sister age (46)    Plan: Colonoscopy

## 2022-10-04 NOTE — TRANSFER OF CARE
Anesthesia Transfer of Care Note    Patient: Tutu Herndon III    Procedure(s) Performed: Procedure(s) (LRB):  COLONOSCOPY (N/A)    Patient location: PACU    Anesthesia Type: general    Transport from OR: Transported from OR on room air with adequate spontaneous ventilation    Post pain: adequate analgesia    Post assessment: no apparent anesthetic complications and tolerated procedure well    Post vital signs: stable    Level of consciousness: responds to stimulation    Nausea/Vomiting: no nausea/vomiting    Complications: none    Transfer of care protocol was followed      Last vitals:   Visit Vitals  /81 (BP Location: Left arm, Patient Position: Lying)   Pulse 82   Temp 36.7 °C (98.1 °F) (Skin)   Resp 16   SpO2 95%

## 2022-10-04 NOTE — PLAN OF CARE
Vss, anahi po fluids, denies pain, ambulates easily. IV removed, catheter intact. Discharge instructions provided and states understanding. States ready to go home.  Discharged from facility with family per wheelchair.

## 2022-10-04 NOTE — ANESTHESIA POSTPROCEDURE EVALUATION
Anesthesia Post Evaluation    Patient: Tutu Herndon III    Procedure(s) Performed: Procedure(s) (LRB):  COLONOSCOPY (N/A)    Final Anesthesia Type: general      Patient location during evaluation: PACU  Patient participation: Yes- Able to Participate  Level of consciousness: awake and alert and oriented  Post-procedure vital signs: reviewed and stable  Pain management: adequate  Airway patency: patent    PONV status at discharge: No PONV  Anesthetic complications: no      Cardiovascular status: blood pressure returned to baseline  Respiratory status: unassisted, spontaneous ventilation and room air  Hydration status: euvolemic  Follow-up not needed.          Vitals Value Taken Time   /71 10/04/22 1324   Temp  10/04/22 1333   Pulse 81 10/04/22 1324   Resp 16 10/04/22 1324   SpO2 96 % 10/04/22 1324         No case tracking events are documented in the log.      Pain/Iglesia Score: Iglesia Score: 10 (10/4/2022  1:24 PM)

## 2022-10-04 NOTE — ANESTHESIA PREPROCEDURE EVALUATION
10/04/2022  Tutu Herndon III is a 52 y.o., male.      Pre-op Assessment    I have reviewed the Patient Summary Reports.     I have reviewed the Nursing Notes. I have reviewed the NPO Status.   I have reviewed the Medications.     Review of Systems  Anesthesia Hx:  No problems with previous Anesthesia Denies Hx of Anesthetic complications    Social:  Non-Smoker Smoking Status: Never  Smokeless Tobacco Status: Former  Alcohol use: Yes, unspecified volume  Drug use: Never       Cardiovascular:   Hypertension Denies MI.  Denies CAD.    Denies CABG/stent.   Denies Angina.    Pulmonary:   Denies COPD.  Denies Asthma.  Denies Recent URI. Sleep Apnea    Renal/:   Denies Chronic Renal Disease.     Hepatic/GI:   Denies GERD. Denies Liver Disease.    Neurological:   Denies TIA. Denies CVA. Denies Seizures.    Endocrine:   Denies Diabetes. Denies Hypothyroidism.    Psych:   Psychiatric History depression          Physical Exam  General: Well nourished, Cooperative, Alert and Oriented    Airway:  Mallampati: II / II  Mouth Opening: Normal  TM Distance: 4 - 6 cm  Tongue: Normal    Dental:  Intact    Chest/Lungs:  Clear to auscultation, Normal Respiratory Rate    Heart:  Rate: Normal  Rhythm: Regular Rhythm  Sounds: Normal        Anesthesia Plan  Type of Anesthesia, risks & benefits discussed:    Anesthesia Type: Gen Natural Airway  Intra-op Monitoring Plan: Standard ASA Monitors  Induction:  IV  Informed Consent: Informed consent signed with the Patient and all parties understand the risks and agree with anesthesia plan.  All questions answered.   ASA Score: 2    Ready For Surgery From Anesthesia Perspective.     .

## 2022-10-04 NOTE — [employer]ATION PATIENT INSTRUCTIONS
Discharge Summary/Instructions after an Endoscopic Procedure  Patient Name: Tutu Herndon  Patient MRN: [id number]  Patient YOB: 1970 Tuesday, October 4, 2022  Kyle Choi MD  Dear patient,  As a result of recent federal legislation (The Federal Cures Act), you may   receive lab or pathology results from your procedure in your [facility]   account before your physician is able to contact you. Your physician or   their representative will relay the results to you with their   recommendations at their soonest availability.  Thank you,  RESTRICTIONS:  During your procedure today, you received medications for sedation.  These   medications may affect your judgment, balance and coordination.  Therefore,   for 24 hours, you have the following restrictions:   - DO NOT drive a car, operate machinery, make legal/financial decisions,   sign important papers or drink alcohol.    ACTIVITY:  Today: no heavy lifting, straining or running due to procedural   sedation/anesthesia.  The following day: return to full activity including work.  DIET:  Eat and drink normally unless instructed otherwise.     TREATMENT FOR COMMON SIDE EFFECTS:  - Mild abdominal pain, nausea, belching, bloating or excessive gas:  rest,   eat lightly and use a heating pad.  - Sore Throat: treat with throat lozenges and/or gargle with warm salt   water.  - Because air was used during the procedure, expelling large amounts of air   from your rectum or belching is normal.  - If a bowel prep was taken, you may not have a bowel movement for 1-3 days.    This is normal.  SYMPTOMS TO WATCH FOR AND REPORT TO YOUR PHYSICIAN:  1. Abdominal pain or bloating, other than gas cramps.  2. Chest pain.  3. Back pain.  4. Signs of infection such as: chills or fever occurring within 24 hours   after the procedure.  5. Rectal bleeding, which would show as bright red, maroon, or black stools.   (A tablespoon of blood from the rectum is not serious, especially  if   hemorrhoids are present.)  6. Vomiting.  7. Weakness or dizziness.  GO DIRECTLY TO THE NEAREST EMERGENCY ROOM IF YOU HAVE ANY OF THE FOLLOWING:      Difficulty breathing              Chills and/or fever over 101 F   Persistent vomiting and/or vomiting blood   Severe abdominal pain   Severe chest pain   Black, tarry stools   Bleeding- more than one tablespoon   Any other symptom or condition that you feel may need urgent attention  Your doctor recommends these additional instructions:  If any biopsies were taken, your doctors clinic will contact you in 1 to 2   weeks with any results.  - Discharge patient to home.   - Advance diet as tolerated.   - Continue present medications.   - Repeat colonoscopy with Golytely at the next available appointment because   the bowel preparation was poor.   - Written discharge instructions were provided to the patient.  For questions, problems or results please call your physician - Kyle Choi MD at Work:  [phone].  [facility], EMERGENCY ROOM PHONE NUMBER: [phone]  IF A COMPLICATION OR EMERGENCY SITUATION ARISES AND YOU ARE UNABLE TO REACH   YOUR PHYSICIAN - GO DIRECTLY TO THE EMERGENCY ROOM.  Kyle Choi MD  10/4/2022 1:23:26 PM  This report has been verified and signed electronically.  Dear patient,  As a result of recent federal legislation (The Federal Cures Act), you may   receive lab or pathology results from your procedure in your [facility]   account before your physician is able to contact you. Your physician or   their representative will relay the results to you with their   recommendations at their soonest availability.  Thank you,  PROVATION

## 2022-10-05 VITALS
OXYGEN SATURATION: 100 % | RESPIRATION RATE: 18 BRPM | SYSTOLIC BLOOD PRESSURE: 105 MMHG | TEMPERATURE: 98 F | HEART RATE: 80 BPM | DIASTOLIC BLOOD PRESSURE: 62 MMHG

## 2022-10-21 ENCOUNTER — TELEPHONE (OUTPATIENT)
Dept: GASTROENTEROLOGY | Facility: CLINIC | Age: 52
End: 2022-10-21
Payer: OTHER GOVERNMENT

## 2022-10-21 NOTE — TELEPHONE ENCOUNTER
----- Message from Kyle Choi MD sent at 10/18/2022  2:57 PM CDT -----  Biopsies from EGD are negative for abnormality. Continue present medications, including PPI BID for 8 weeks then daily thereafter. Plan repeat EGD in 8 weeks to ensure healing.

## 2022-11-01 ENCOUNTER — HOSPITAL ENCOUNTER (OUTPATIENT)
Facility: HOSPITAL | Age: 52
Discharge: HOME | End: 2022-11-01
Attending: INTERNAL MEDICINE | Admitting: INTERNAL MEDICINE
Payer: OTHER GOVERNMENT

## 2022-11-01 ENCOUNTER — ANESTHESIA EVENT (OUTPATIENT)
Dept: ENDOSCOPY | Facility: HOSPITAL | Age: 52
End: 2022-11-01
Payer: OTHER GOVERNMENT

## 2022-11-01 ENCOUNTER — ANESTHESIA (OUTPATIENT)
Dept: ENDOSCOPY | Facility: HOSPITAL | Age: 52
End: 2022-11-01
Payer: OTHER GOVERNMENT

## 2022-11-01 VITALS
RESPIRATION RATE: 16 BRPM | SYSTOLIC BLOOD PRESSURE: 127 MMHG | TEMPERATURE: 98 F | OXYGEN SATURATION: 98 % | BODY MASS INDEX: 32.93 KG/M2 | DIASTOLIC BLOOD PRESSURE: 92 MMHG | HEART RATE: 71 BPM | WEIGHT: 230 LBS | HEIGHT: 70 IN

## 2022-11-01 DIAGNOSIS — Z12.11 SCREENING FOR COLON CANCER: Primary | ICD-10-CM

## 2022-11-01 PROCEDURE — G0105 COLORECTAL SCRN; HI RISK IND: HCPCS | Mod: ,,, | Performed by: INTERNAL MEDICINE

## 2022-11-01 PROCEDURE — D9220A PRA ANESTHESIA: ICD-10-PCS | Mod: CRNA,,, | Performed by: NURSE ANESTHETIST, CERTIFIED REGISTERED

## 2022-11-01 PROCEDURE — 00812 ANES LWR INTST SCR COLSC: CPT | Performed by: INTERNAL MEDICINE

## 2022-11-01 PROCEDURE — 25000003 PHARM REV CODE 250: Performed by: INTERNAL MEDICINE

## 2022-11-01 PROCEDURE — 37000009 HC ANESTHESIA EA ADD 15 MINS: Performed by: INTERNAL MEDICINE

## 2022-11-01 PROCEDURE — D9220A PRA ANESTHESIA: ICD-10-PCS | Mod: ANES,,, | Performed by: ANESTHESIOLOGY

## 2022-11-01 PROCEDURE — G0105 COLORECTAL SCRN; HI RISK IND: HCPCS | Performed by: INTERNAL MEDICINE

## 2022-11-01 PROCEDURE — D9220A PRA ANESTHESIA: Mod: CRNA,,, | Performed by: NURSE ANESTHETIST, CERTIFIED REGISTERED

## 2022-11-01 PROCEDURE — 25000003 PHARM REV CODE 250: Performed by: NURSE ANESTHETIST, CERTIFIED REGISTERED

## 2022-11-01 PROCEDURE — D9220A PRA ANESTHESIA: Mod: ANES,,, | Performed by: ANESTHESIOLOGY

## 2022-11-01 PROCEDURE — G0105 COLORECTAL SCRN; HI RISK IND: ICD-10-PCS | Mod: ,,, | Performed by: INTERNAL MEDICINE

## 2022-11-01 PROCEDURE — 37000008 HC ANESTHESIA 1ST 15 MINUTES: Performed by: INTERNAL MEDICINE

## 2022-11-01 PROCEDURE — 63600175 PHARM REV CODE 636 W HCPCS: Performed by: NURSE ANESTHETIST, CERTIFIED REGISTERED

## 2022-11-01 RX ORDER — SODIUM CHLORIDE 9 MG/ML
INJECTION, SOLUTION INTRAVENOUS CONTINUOUS
Status: DISCONTINUED | OUTPATIENT
Start: 2022-11-01 | End: 2022-11-01 | Stop reason: HOSPADM

## 2022-11-01 RX ORDER — LIDOCAINE HCL/PF 100 MG/5ML
SYRINGE (ML) INTRAVENOUS
Status: DISCONTINUED | OUTPATIENT
Start: 2022-11-01 | End: 2022-11-01

## 2022-11-01 RX ORDER — PROPOFOL 10 MG/ML
VIAL (ML) INTRAVENOUS
Status: DISCONTINUED | OUTPATIENT
Start: 2022-11-01 | End: 2022-11-01

## 2022-11-01 RX ADMIN — PROPOFOL 175 MG: 10 INJECTION, EMULSION INTRAVENOUS at 09:11

## 2022-11-01 RX ADMIN — LIDOCAINE HYDROCHLORIDE 100 MG: 20 INJECTION INTRAVENOUS at 09:11

## 2022-11-01 RX ADMIN — SODIUM CHLORIDE: 0.9 INJECTION, SOLUTION INTRAVENOUS at 08:11

## 2022-11-01 RX ADMIN — PROPOFOL 50 MG: 10 INJECTION, EMULSION INTRAVENOUS at 09:11

## 2022-11-01 RX ADMIN — PROPOFOL 25 MG: 10 INJECTION, EMULSION INTRAVENOUS at 09:11

## 2022-11-01 NOTE — ANESTHESIA PREPROCEDURE EVALUATION
11/01/2022  Tutu Herndon III is a 52 y.o., male.      Pre-op Assessment    I have reviewed the NPO Status.   I have reviewed the Medications.     Review of Systems  Anesthesia Hx:  No problems with previous Anesthesia    Cardiovascular:   Exercise tolerance: good Hypertension    Pulmonary:   Sleep Apnea    Education provided regarding risk of obstructive sleep apnea     Hepatic/GI:   Bowel Prep.    Neurological:  Neurology Normal    Psych:   Psychiatric History depression          Physical Exam  General: Well nourished    Airway:  Mallampati: III / II  Mouth Opening: Normal  Neck ROM: Normal ROM        Anesthesia Plan  Type of Anesthesia, risks & benefits discussed:    Anesthesia Type: Gen Natural Airway  Intra-op Monitoring Plan: Standard ASA Monitors  Induction:  IV  Informed Consent: Informed consent signed with the Patient and all parties understand the risks and agree with anesthesia plan.  All questions answered.   ASA Score: 2    Ready For Surgery From Anesthesia Perspective.     .

## 2022-11-01 NOTE — TRANSFER OF CARE
"Anesthesia Transfer of Care Note    Patient: Tutu Herndon III    Procedure(s) Performed: Procedure(s) (LRB):  COLONOSCOPY (N/A)    Patient location: PACU    Anesthesia Type: general    Transport from OR: Continuos invasive BP monitoring in transport    Post pain: adequate analgesia    Post assessment: no apparent anesthetic complications and tolerated procedure well    Post vital signs: stable    Level of consciousness: sedated and awake    Nausea/Vomiting: no nausea/vomiting    Complications: none    Transfer of care protocol was followed      Last vitals:   Visit Vitals  BP (!) 154/93 (BP Location: Left arm, Patient Position: Lying)   Pulse 71   Temp 36.8 °C (98.2 °F) (Skin)   Resp 16   Ht 5' 10" (1.778 m)   Wt 104.3 kg (230 lb)   SpO2 97%   BMI 33.00 kg/m²     "

## 2022-11-01 NOTE — H&P
History & Physical - Short Stay  Gastroenterology      SUBJECTIVE:     Procedure: Colonoscopy    Chief Complaint/Indication for Procedure: Screening, family history of colon cancer in sister age (46)    PTA Medications   Medication Sig    calcium carbonate (TUMS) 200 mg calcium (500 mg) chewable tablet Take 1 tablet by mouth once daily.    doxycycline (ORACEA) 40 mg capsule Take 40 mg by mouth once daily.    mag/aluminum/sod bicarb/alginc (GAVISCON ORAL) Take by mouth every evening.    meloxicam (MOBIC) 15 MG tablet Take 1 tablet (15 mg total) by mouth once daily.    multivitamin (THERAGRAN) per tablet Take 1 tablet by mouth.    naproxen sodium (ANAPROX) 220 MG tablet Take 220 mg by mouth.    omega-3 fatty acids/fish oil (FISH OIL-OMEGA-3 FATTY ACIDS) 300-1,000 mg capsule Take by mouth once daily.    pantoprazole (PROTONIX) 40 MG tablet Take 1 tablet (40 mg total) by mouth 2 (two) times daily.    prazosin (MINIPRESS) 2 MG Cap Take 1 mg by mouth once daily.    sertraline (ZOLOFT) 25 MG tablet Take 25 mg by mouth once daily.    SODIUM SULFATE MISC by Misc.(Non-Drug; Combo Route) route. topical    telmisartan (MICARDIS) 40 MG Tab Take 1 tablet (40 mg total) by mouth once daily.    traZODone (DESYREL) 50 MG tablet Take 50 mg by mouth every evening.    metronidazole 1% (METROGEL) 1 % Gel Apply topically once daily.    triamcinolone (KENALOG) 0.5 % ointment Apply topically 2 (two) times daily.       Review of patient's allergies indicates:   Allergen Reactions    Lisinopril Other (See Comments)     Cough    Penicillins      Rash         Past Medical History:   Diagnosis Date    Arthritis     Depression     Eczema     GERD (gastroesophageal reflux disease)     Hypertension     Insomnia     Malignant melanoma of skin, unspecified     Rosacea     Sleep apnea      Past Surgical History:   Procedure Laterality Date    COLONOSCOPY N/A 10/4/2022    Procedure: COLONOSCOPY;  Surgeon: Kyle Choi MD;  Location: [facility];   Service: Endoscopy;  Laterality: N/A;    ESOPHAGOGASTRODUODENOSCOPY N/A 9/20/2022    Procedure: EGD (ESOPHAGOGASTRODUODENOSCOPY);  Surgeon: Kyle Choi MD;  Location: [facility];  Service: Endoscopy;  Laterality: N/A;    EXCISION OF MELANOMA N/A 03/19/2021    Procedure: EXCISION, MELANOMA;  Surgeon: Tito Irving MD;  Location: [facility] OR;  Service: General;  Laterality: N/A;  excision of melanoma mid back    KNEE ARTHROSCOPY Left     SHOULDER ARTHROSCOPY Right     ULNAR TUNNEL RELEASE Left      Family History   Problem Relation Age of Onset    Cancer Mother     Esophageal cancer Father     Cancer Father     Colon cancer Sister 46    Esophageal cancer Brother     Stomach cancer Neg Hx      Social History     Tobacco Use    Smoking status: Never    Smokeless tobacco: Never   Substance Use Topics    Alcohol use: Yes     Comment: rare    Drug use: Never         OBJECTIVE:     Vital Signs (Most Recent)  Temp: 98.2 °F (36.8 °C) (11/01/22 0829)  Pulse: 71 (11/01/22 0829)  Resp: 16 (11/01/22 0829)  BP: (!) 154/93 (11/01/22 0829)  SpO2: 97 % (11/01/22 0829)    Physical Exam:                                                       GENERAL:  Comfortable, in no acute distress.                                 HEENT EXAM:  Nonicteric.  No adenopathy.  Oropharynx is clear.               NECK:  Supple.                                                               LUNGS:  Clear.                                                               CARDIAC:  Regular rate and rhythm.  S1, S2.  No murmur.                      ABDOMEN:  Soft, positive bowel sounds, nontender.  No hepatosplenomegaly or masses.  No rebound or guarding.                                             EXTREMITIES:  No edema.     MENTAL STATUS:  Normal, alert and oriented.      ASSESSMENT/PLAN:     Assessment: Screening, family history of colon cancer in sister age (46)    Plan: Colonoscopy

## 2022-11-01 NOTE — PROVATION PATIENT INSTRUCTIONS
Discharge Summary/Instructions after an Endoscopic Procedure  Patient Name: Tutu Herndon  Patient MRN: [id number]  Patient YOB: 1970 Tuesday, November 1, 2022  Kyle Choi MD  Dear patient,  As a result of recent federal legislation (The Federal Cures Act), you may   receive lab or pathology results from your procedure in your [facility]   account before your physician is able to contact you. Your physician or   their representative will relay the results to you with their   recommendations at their soonest availability.  Thank you,  RESTRICTIONS:  During your procedure today, you received medications for sedation.  These   medications may affect your judgment, balance and coordination.  Therefore,   for 24 hours, you have the following restrictions:   - DO NOT drive a car, operate machinery, make legal/financial decisions,   sign important papers or drink alcohol.    ACTIVITY:  Today: no heavy lifting, straining or running due to procedural   sedation/anesthesia.  The following day: return to full activity including work.  DIET:  Eat and drink normally unless instructed otherwise.     TREATMENT FOR COMMON SIDE EFFECTS:  - Mild abdominal pain, nausea, belching, bloating or excessive gas:  rest,   eat lightly and use a heating pad.  - Sore Throat: treat with throat lozenges and/or gargle with warm salt   water.  - Because air was used during the procedure, expelling large amounts of air   from your rectum or belching is normal.  - If a bowel prep was taken, you may not have a bowel movement for 1-3 days.    This is normal.  SYMPTOMS TO WATCH FOR AND REPORT TO YOUR PHYSICIAN:  1. Abdominal pain or bloating, other than gas cramps.  2. Chest pain.  3. Back pain.  4. Signs of infection such as: chills or fever occurring within 24 hours   after the procedure.  5. Rectal bleeding, which would show as bright red, maroon, or black stools.   (A tablespoon of blood from the rectum is not serious, especially  if   hemorrhoids are present.)  6. Vomiting.  7. Weakness or dizziness.  GO DIRECTLY TO THE NEAREST EMERGENCY ROOM IF YOU HAVE ANY OF THE FOLLOWING:      Difficulty breathing              Chills and/or fever over 101 F   Persistent vomiting and/or vomiting blood   Severe abdominal pain   Severe chest pain   Black, tarry stools   Bleeding- more than one tablespoon   Any other symptom or condition that you feel may need urgent attention  Your doctor recommends these additional instructions:  If any biopsies were taken, your doctors clinic will contact you in 1 to 2   weeks with any results.  - Repeat colonoscopy every 5 years for screening purposes (due to family   history).   - Discharge patient to home.   - Advance diet as tolerated.   - Continue present medications.   - Written discharge instructions were provided to the patient.  For questions, problems or results please call your physician - Kyle Choi MD at Work:  [phone].  [facility], EMERGENCY ROOM PHONE NUMBER: [phone]  IF A COMPLICATION OR EMERGENCY SITUATION ARISES AND YOU ARE UNABLE TO REACH   YOUR PHYSICIAN - GO DIRECTLY TO THE EMERGENCY ROOM.  Kyle Choi MD  11/1/2022 9:49:34 AM  This report has been verified and signed electronically.  Dear patient,  As a result of recent federal legislation (The Federal Cures Act), you may   receive lab or pathology results from your procedure in your [facility]   account before your physician is able to contact you. Your physician or   their representative will relay the results to you with their   recommendations at their soonest availability.  Thank you,  PROVATION

## 2023-01-06 DIAGNOSIS — M25.551 RIGHT HIP PAIN: Primary | ICD-10-CM

## 2023-01-09 ENCOUNTER — HOSPITAL ENCOUNTER (OUTPATIENT)
Dept: RADIOLOGY | Facility: HOSPITAL | Age: 53
Discharge: HOME | End: 2023-01-09
Attending: ORTHOPAEDIC SURGERY
Payer: OTHER GOVERNMENT

## 2023-01-09 ENCOUNTER — OFFICE VISIT (OUTPATIENT)
Dept: ORTHOPEDICS | Facility: CLINIC | Age: 53
End: 2023-01-09
Payer: OTHER GOVERNMENT

## 2023-01-09 VITALS — RESPIRATION RATE: 18 BRPM | BODY MASS INDEX: 32.93 KG/M2 | HEIGHT: 70 IN | WEIGHT: 230 LBS

## 2023-01-09 DIAGNOSIS — M25.551 RIGHT HIP PAIN: ICD-10-CM

## 2023-01-09 DIAGNOSIS — M16.0 PRIMARY OSTEOARTHRITIS OF BOTH HIPS: Primary | ICD-10-CM

## 2023-01-09 PROCEDURE — 73502 XR HIP WITH PELVIS WHEN PERFORMED, 2 OR 3  VIEWS RIGHT: ICD-10-PCS | Mod: 26,RT,, | Performed by: RADIOLOGY

## 2023-01-09 PROCEDURE — 99213 OFFICE O/P EST LOW 20 MIN: CPT | Mod: PBBFAC,PN | Performed by: ORTHOPAEDIC SURGERY

## 2023-01-09 PROCEDURE — 73502 X-RAY EXAM HIP UNI 2-3 VIEWS: CPT | Mod: TC,PN,RT

## 2023-01-09 PROCEDURE — 73502 X-RAY EXAM HIP UNI 2-3 VIEWS: CPT | Mod: 26,RT,, | Performed by: RADIOLOGY

## 2023-01-09 PROCEDURE — 99213 OFFICE O/P EST LOW 20 MIN: CPT | Mod: S$PBB,,, | Performed by: ORTHOPAEDIC SURGERY

## 2023-01-09 PROCEDURE — 99999 PR PBB SHADOW E&M-EST. PATIENT-LVL III: CPT | Mod: PBBFAC,,, | Performed by: ORTHOPAEDIC SURGERY

## 2023-01-09 PROCEDURE — 99999 PR PBB SHADOW E&M-EST. PATIENT-LVL III: ICD-10-PCS | Mod: PBBFAC,,, | Performed by: ORTHOPAEDIC SURGERY

## 2023-01-09 PROCEDURE — 99213 PR OFFICE/OUTPT VISIT, EST, LEVL III, 20-29 MIN: ICD-10-PCS | Mod: S$PBB,,, | Performed by: ORTHOPAEDIC SURGERY

## 2023-01-09 NOTE — PROGRESS NOTES
CC:  52-year-old male follows up with bilateral hip pain.  The patient has osteoarthritis of both hips.  Today he states his right is worse than his left.  His pain as a 3/10.  His pain is worse when transitioning from sitting to standing.  He received a left hip injection on 07/19/2022 with some improvement of his pain.  He is having pain with basic daily activity and occasionally pain keeps him up at night.    Past Medical History:   Diagnosis Date    Arthritis     Depression     Eczema     GERD (gastroesophageal reflux disease)     Hypertension     Insomnia     Malignant melanoma of skin, unspecified     Rosacea     Sleep apnea      Past Surgical History:   Procedure Laterality Date    COLONOSCOPY N/A 10/4/2022    Procedure: COLONOSCOPY;  Surgeon: Kyle Choi MD;  Location: [facility] ENDO;  Service: Endoscopy;  Laterality: N/A;    COLONOSCOPY N/A 11/1/2022    Procedure: COLONOSCOPY;  Surgeon: Kyle Choi MD;  Location: [facility] ENDO;  Service: Endoscopy;  Laterality: N/A;    ESOPHAGOGASTRODUODENOSCOPY N/A 9/20/2022    Procedure: EGD (ESOPHAGOGASTRODUODENOSCOPY);  Surgeon: Kyle Choi MD;  Location: [facility] ENDO;  Service: Endoscopy;  Laterality: N/A;    EXCISION OF MELANOMA N/A 03/19/2021    Procedure: EXCISION, MELANOMA;  Surgeon: Tito Irving MD;  Location: [facility] OR;  Service: General;  Laterality: N/A;  excision of melanoma mid back    KNEE ARTHROSCOPY Left     SHOULDER ARTHROSCOPY Right     ULNAR TUNNEL RELEASE Left        Current Outpatient Medications on File Prior to Visit   Medication Sig Dispense Refill    calcium carbonate (TUMS) 200 mg calcium (500 mg) chewable tablet Take 1 tablet by mouth once daily.      doxycycline (ORACEA) 40 mg capsule Take 40 mg by mouth once daily.      mag/aluminum/sod bicarb/alginc (GAVISCON ORAL) Take by mouth every evening.      meloxicam (MOBIC) 15 MG tablet Take 1 tablet (15 mg total) by mouth once daily. 30 tablet 11    metronidazole 1% (METROGEL) 1 % Gel  Apply topically once daily.      multivitamin (THERAGRAN) per tablet Take 1 tablet by mouth.      naproxen sodium (ANAPROX) 220 MG tablet Take 220 mg by mouth.      omega-3 fatty acids/fish oil (FISH OIL-OMEGA-3 FATTY ACIDS) 300-1,000 mg capsule Take by mouth once daily.      pantoprazole (PROTONIX) 40 MG tablet TAKE 1 TABLET(40 MG) BY MOUTH TWICE DAILY 60 tablet 3    prazosin (MINIPRESS) 2 MG Cap Take 1 mg by mouth once daily.      sertraline (ZOLOFT) 25 MG tablet Take 25 mg by mouth once daily.      SODIUM SULFATE MISC by Misc.(Non-Drug; Combo Route) route. topical      telmisartan (MICARDIS) 40 MG Tab Take 1 tablet (40 mg total) by mouth once daily. 90 tablet 3    traZODone (DESYREL) 50 MG tablet Take 50 mg by mouth every evening.      triamcinolone (KENALOG) 0.5 % ointment Apply topically 2 (two) times daily.       No current facility-administered medications on file prior to visit.       ROS:    Constitution: Denies chills, fever, and sweats.  HENT: Denies headaches or blurry vision.  Cardiovascular: Denies chest pain or irregular heart beat.  Respiratory: Denies cough or shortness of breath.  Gastrointestinal: Denies abdominal pain, nausea, or vomiting.  Genitourinary:  Denies urinary incontinence, bladder and kidney issues  Musculoskeletal:  Denies muscle cramps.  Positive for bilateral hip pain right worse than left  Neurological: Denies dizziness or focal weakness.  Psychiatric/Behavioral: Normal mental status.  Hematologic/Lymphatic: Denies bleeding problem or easy bruising/bleeding.  Skin: Denies rash or suspicious lesions.    Physical examination     Gen - No acute distress, well nourished, well groomed   Eyes - Extraoccular motions intact, pupils equally round and reactive to light and accommodation   ENT - normocephalic, atruamtic, oropharynx clear   Neck - Supple, no abnormal masses   Cardiovascular - regular rate and rhythm   Pulmonary - clear to auscultation bilaterally, no wheezes, ronchi, or rales    Abdomen - soft, non-tender, non-distended, positive bowel sounds   Psych - The patient is alert and oriented x3 with normal mood and affect    Examination of the Right Lower Extremity    Skin is intact throughout  Motor in intact EHL,FHL,TA,lesley  +2 DP/PT  Sensation LT intact D/P/1st    Examination of the Right Hip    C-Sign positive  Logroll positive  Stenchfield positive    Pain with ROM positive    ROM:    Flexion   120  Extension   10  Abduction   45  Adduction   5  External Rotation 35  Internal Rotation 5    Flexion contracture negative    FADIR positive  FADER positive    Tenderness to palpation over lateral and posterolateral greater tochanter negative    Examination of the Left Lower Extremity    Skin is intact throughout  Motor in intact EHL,FHL,TA,lesley  +2 DP/PT  Sensation LT intact D/P/1st    Examination of the Left Hip    C-Sign positive  Logroll positive  Stenchfield positive    Pain with ROM positive    ROM:    Flexion   120  Extension   10  Abduction   45  Adduction   5  External Rotation 35  Internal Rotation 5    Flexion contracture negative    FADIR positive  FADER negative    Tenderness to palpation over lateral and posterolateral greater tochanter negative    X-ray images were examined and personally interpreted by me.  Three views of bilateral hips dated 01/09/2023 show severe osteoarthritis of both hips with complete loss of joint space and subchondral sclerosis.  The patient's arthritis has progressed since our last set of x-rays on 02/28/2022    Dx:  Severe bilateral hip osteoarthritis    Plan:  The patient wants to have his hips replaced.  [facility]  He is going to follow up with us after he gets his referral to discuss hip replacement surgery.

## 2023-01-18 ENCOUNTER — OFFICE VISIT (OUTPATIENT)
Dept: OTOLARYNGOLOGY | Facility: CLINIC | Age: 53
End: 2023-01-18
Payer: OTHER GOVERNMENT

## 2023-01-18 VITALS — HEIGHT: 70 IN | BODY MASS INDEX: 39.42 KG/M2 | WEIGHT: 275.38 LBS

## 2023-01-18 DIAGNOSIS — G47.33 OSA (OBSTRUCTIVE SLEEP APNEA): Primary | ICD-10-CM

## 2023-01-18 DIAGNOSIS — Z78.9 INTOLERANCE OF CONTINUOUS POSITIVE AIRWAY PRESSURE (CPAP) VENTILATION: ICD-10-CM

## 2023-01-18 PROCEDURE — 99215 OFFICE O/P EST HI 40 MIN: CPT | Mod: S$PBB,,, | Performed by: OTOLARYNGOLOGY

## 2023-01-18 PROCEDURE — 99215 PR OFFICE/OUTPT VISIT, EST, LEVL V, 40-54 MIN: ICD-10-PCS | Mod: S$PBB,,, | Performed by: OTOLARYNGOLOGY

## 2023-01-18 PROCEDURE — 99214 OFFICE O/P EST MOD 30 MIN: CPT | Mod: PBBFAC,PO | Performed by: OTOLARYNGOLOGY

## 2023-01-18 PROCEDURE — 99999 PR PBB SHADOW E&M-EST. PATIENT-LVL IV: CPT | Mod: PBBFAC,,, | Performed by: OTOLARYNGOLOGY

## 2023-01-18 PROCEDURE — 99999 PR PBB SHADOW E&M-EST. PATIENT-LVL IV: ICD-10-PCS | Mod: PBBFAC,,, | Performed by: OTOLARYNGOLOGY

## 2023-01-18 NOTE — PATIENT INSTRUCTIONS
Information regarding Hypoglossal Nerve Stimulation Therapy:    Hypoglossal Nerve Stimulation Therapy (HGNS) is a surgical treatment for sleep apnea when a patient fails standard positive pressure (CPAP) therapy. This is an effective surgical therapy for sleep apnea with long term effectiveness in many patients.    What is HGNS and how does it work?  The hypoglossal nerve is the nerve that controls nearly all of the tongue movements.   During sleep, the tongue will often fall into the back of the throat. This causes obstruction and apneas by narrowing the throat.  By targeting the tongue muscles, we can often control the prevention of tongue collapse during sleep thereby preventing collapse and apnea.  This procedure involves inserting an implant over the muscles of the chest wall (similar to a pacemaker.) This implant is connected to the nerve that moves the tongue forward. During sleep, the implant will detect breathing effort, and during inspiration, it will cause a gentle pulse of the tongue forward to prevent collapse.   The machine can be turned on and off, and it can be dialed up or down depending on strength needed to move the tongue forward.    Will I feel it? Will it affect my sleep at night?  Once the implant is in place, it is activated after 1 month. After this, you will begin increasing the settings to find the most comfortable setting for you. We work hard with you to get the right settings.   A majority of patients are not disturbed at night with the device, and satisfaction is extremely high (95%.) This is especially true when compared to wearing an external device (CPAP.)   A post-titration sleep study is typically obtained about 3 months after surgery to assess response.     Am I a Candidate?  Candidacy for HGNS is changing over time; however, current general candidacy requirements are:  Moderate or Severe Sleep Apnea (AHI 15-65) as measured by a recent sleep study  Body Mass Index (BMI) < 35    Failure to tolerate CPAP therapy  Pre-operative endoscopy exam confirming candidacy (performed during a quick outpatient procedure)  No contraindications to implant  Approval by insurance company  **The current device is currently under conditional acceptance for use with MRI of the thoracic, shoulder, or abdomen area.    How is the surgery performed:  The surgery is usually performed in an outpatient setting, under general anesthesia.  The procedure generally takes a few hours.  The procedure involved two incisions: one in the neck, just below the jaw line. The other is in the chest between the 2nd and third rib. The surgical scars generally heal very well and are minimally noticeable.   Please let your doctor know if you have had surgery or significant trauma to the chest or neck.     What are the risks?  General surgical risks, similar to any procedure include  Bleeding or hematoma  Numbness over incision site  Incisional scar  Pain over incision site  Infection of implant requiring revision or replacement (<1%)  Intolerance to the therapy resulting in non-usage    Specific risks to this surgery include:  Temporary tongue weakness or tingling, rarely permanent (< 1%)  Mechanical failure of the implant  Temporary or permanent weakness to the muscles of the lip (< 1%)  Stimulation related discomfort or tongue abrasions (8%)  Pneumothorax (dropped lung)    After care:  Instructions will be given following the surgery  Generally, light activity for 2 weeks is recommended.  Blood thinners are held prior to surgery at the discretion of the surgeon and can usually be resumed within 48 hours.     Post-operative timeline:    WEEK 1:  About 7 days after your procedure, you will return to the office for a follow up visit. At this time any surtures that we placed will be removed and your incisions will be checked by the pysician. Please note that device will remain inactive for ONE MONTH. This is refered to as your  healing phase. We want to give your body time to heal before we turn the device on. During this time it would be benefical for you to watch the vidoes on the inspire tima. Simply download the tima if you have not done so, then click on the RESOURCES tab. From there, scrol to the INSPIRE CARE section. There are vidoes here that walk you though this phase and will prepare you for the activation visit. Focus on the vidoes entitled: Rest and Heal, Learning your sleep remote, and Tuning Inspire. This will help you feel educated and empowered for your visit.     WEEK 4:  At the one month visit you will come to the office for the device activation. Please wear a shirt or blouse that can allow the physician to check all the incisions and access the implant. Your physician will use a  to check the device for proper function and turn it on for you to begin using Inspire therapy. The process of activation is NOT painful. At this visit you will be given your remote control and instructions on how to use. You will return home to begin using your therapy with the goal of using it ALL NIGHT, EVERY NIGHT. If you have any issues with discomfort, please call the office so we can help you reach this goal.    WEEK 6-8:  Someone from our office will be doing a check in call to see how you are progressing. If you are having any issues, we can bring you in to the office and make some appropratie adjustments as needed. Otherwise, you will continue to keep stepping up your therapy and using it all night.     WEEK 12:  Roughly 3 months after your device is turned on, you will return to the sleep lab. This will help us determine if your therapy is at the appropriate level for you. A couple of weeks after your sleep study, you will return to the office to review your results and make any changes that your provider feels necessary.       FAQ:  I don't feel the stimulation?   Answer: During the tuning phase, you will get used to  stimulation.  It's very common to not feel stimulation.  Try stepping up your level.    The stimulation does not synchronize with my breathing?    Answer: This is normal.  Stimulation timing is designed to work best while you are asleep.  Your breathing pattern is much different while you are awake.  When you go to sleep, the sensor will work normally.     Can I receive an MRI?    Answer: If you have the model 3028 generator it is MRI Conditional, make sure your doctor goes to the Inspire website for more information on how to perform a safe MRI for you.     Can I go to the airport?    Answer: Yes.  You may go through metal detectors and scanners.  Make sure to show the TSA official your medical device registration card and tell them you have an implantable device in your body.  They may require extra screening, so make sure to plan for extra time your first time going to the airport.     Can I go scuba diving and mountain climbing?    Answer: You can go to as high in elevation as you want.  However, you can only go 30 meters below sea level or 4 atmospheres of pressure.     Can I continue welding?    Answer: Inspire does not recommend electrical welding.  Consult your patient manual for more details.    Can I go to the dentist?    Answer: Going to the dentist is perfectly fine. Let your dentist know you have an implantable device and the dentist will take any precautions needed.     Can I receive X-Rays or CT Scans?    Answer: Yes.  X-Ray and CT Scans are safe with Inspire.  If you are having a mammogram though, make sure to let the technician know where your device is implanted so the technician can make the mammogram as comfortable as possible.     Why is stimulation on when I'm awake?    Answer: This is normal.  Inspire works by stimulating during every breath.  Inspire does not know when you are asleep or awake, so once you turn on Inspire, it will stimulate with each breath. Remember, you are in complete  control of your Inspire and can turn Inspire ON or OFF using your Inspire Sleep Remote.     Why is stimulation off when I wake up?    Answer: You may have slept longer than the Therapy Duration Setting, which by default is 8 hours.  Inspire will turn off automatically after 8 hours.  If you are sleeping longer than 8 hours, ask your physician to change the duration setting to a longer time (for example 9 or 10 hours).  It is also possible that you are getting used to Inspire and just don't feel the stimulation when you wake up.  If you're sleep remote is green when you gently shake it, your Inspire is still on.  If your Inspire is white, then it is no longer stimulating.       More information can be found at:  Www.[email or web address].com

## 2023-01-18 NOTE — PROGRESS NOTES
Subjective:       Patient ID: Tutu Herndon III is a 52 y.o. male.    Chief Complaint: Consult (inspire)    Tutu is here for Obstructive sleep apnea and intolerance of CPAP.  he was diagnosed with JIMBO 6 years ago.   Last sleep study: 2014. In lab study. Moderate to severe;  he has issues with CPAP compliance: He does take it off at some point in the night, maybe 4-6 hours into it. He does not wear it about 2-3 nights per week and notices significant difference in sleep quality on those nights (for the worse.)   He currently uses the full face mask. Has tried the nasal masks but unable to tolerate. Masks migrates periodically.   60 lb. Weight change over time.  ESS - 13  Body mass index is 39.51 kg/m².      Pertinent medical issues: none  Anticoagulation: none  Pertinent surgery: none    Patient validated questionnaires (if applicable):     NOSE score:: (P) 60%       No flowsheet data found.  No flowsheet data found.  No flowsheet data found.         Social History     Tobacco Use   Smoking Status Never   Smokeless Tobacco Never     Social History     Substance and Sexual Activity   Alcohol Use Yes    Comment: rare          Objective:        Constitutional:   He is oriented to person, place, and time. He appears well-developed and well-nourished. He appears alert. He is active. Normal speech.      Head:  Normocephalic and atraumatic. Head is without TMJ tenderness. No scars. Salivary glands normal.  Facial strength is normal.      Ears:    Right Ear: No drainage or swelling. No middle ear effusion.   Left Ear: No drainage or swelling.  No middle ear effusion.     Nose:  No mucosal edema, rhinorrhea or sinus tenderness. No turbinate hypertrophy.      Mouth/Throat  Oropharynx clear and moist without lesions or asymmetry, normal uvula midline and mirror exam normal. Normal dentition. No uvula swelling, lacerations or trismus. No oropharyngeal exudate. +1.  Tonsillar erythema, tonsillar exudate.    Narrow  pharynx  Damon palate III      Neck:  Full range of motion with neck supple and no adenopathy. Thyroid tenderness is present. No tracheal deviation, no edema, no erythema, normal range of motion, no stridor, no crepitus and no neck rigidity present. No thyroid mass present.     Cardiovascular:    Intact distal pulses and normal pulses.              Pulmonary/Chest:   Effort normal and breath sounds normal. No stridor.     Psychiatric:   His speech is normal and behavior is normal. His mood appears not anxious. His affect is not labile.     Neurological:   He is alert and oriented to person, place, and time. No sensory deficit.     Skin:   No abrasions, lacerations, lesions, or rashes. No abrasion and no bruising noted.       Tests / Results:  none    Assessment:       1. JIMBO (obstructive sleep apnea)    2. BMI 39.0-39.9,adult    3. Intolerance of continuous positive airway pressure (CPAP) ventilation          Plan:         Discussed background of JIMBO at length including medical therapy and surgical therapy    Patient interested in Inspire  Discussed weight loss  Repeat PSG when weight in range   DISE (drug induced sleep endoscopy)    I discussed the risks of hypoglossal nerve stimulation including: scar, bleeding, numbness of incision, numbness or weakness of tongue or marginal mandibular nerve, irritation of tongue from stimulation, implant failure, inadequate improvement, need for further procedures, need for removal of implant, infection, pneumothorax, MRI incompatibility.

## 2023-02-14 ENCOUNTER — OFFICE VISIT (OUTPATIENT)
Dept: ORTHOPEDICS | Facility: CLINIC | Age: 53
End: 2023-02-14
Payer: OTHER GOVERNMENT

## 2023-02-14 ENCOUNTER — PATIENT MESSAGE (OUTPATIENT)
Dept: ORTHOPEDICS | Facility: CLINIC | Age: 53
End: 2023-02-14

## 2023-02-14 VITALS — WEIGHT: 275.38 LBS | BODY MASS INDEX: 39.42 KG/M2 | HEIGHT: 70 IN

## 2023-02-14 DIAGNOSIS — Z96.641 STATUS POST TOTAL HIP REPLACEMENT, RIGHT: Primary | ICD-10-CM

## 2023-02-14 DIAGNOSIS — M16.0 PRIMARY OSTEOARTHRITIS OF BOTH HIPS: Primary | ICD-10-CM

## 2023-02-14 DIAGNOSIS — Z01.818 PREOP TESTING: ICD-10-CM

## 2023-02-14 PROCEDURE — 99213 OFFICE O/P EST LOW 20 MIN: CPT | Mod: PBBFAC,PN | Performed by: ORTHOPAEDIC SURGERY

## 2023-02-14 PROCEDURE — 99999 PR PBB SHADOW E&M-EST. PATIENT-LVL III: ICD-10-PCS | Mod: PBBFAC,,, | Performed by: ORTHOPAEDIC SURGERY

## 2023-02-14 PROCEDURE — 99999 PR PBB SHADOW E&M-EST. PATIENT-LVL III: CPT | Mod: PBBFAC,,, | Performed by: ORTHOPAEDIC SURGERY

## 2023-02-14 PROCEDURE — 99215 OFFICE O/P EST HI 40 MIN: CPT | Mod: 57,S$PBB,, | Performed by: ORTHOPAEDIC SURGERY

## 2023-02-14 PROCEDURE — 99215 PR OFFICE/OUTPT VISIT, EST, LEVL V, 40-54 MIN: ICD-10-PCS | Mod: 57,S$PBB,, | Performed by: ORTHOPAEDIC SURGERY

## 2023-02-14 RX ORDER — MUPIROCIN 20 MG/G
OINTMENT TOPICAL
Status: CANCELLED | OUTPATIENT
Start: 2023-02-14

## 2023-02-14 RX ORDER — CLINDAMYCIN PHOSPHATE 900 MG/50ML
900 INJECTION, SOLUTION INTRAVENOUS
Status: CANCELLED | OUTPATIENT
Start: 2023-02-14

## 2023-02-14 NOTE — H&P (VIEW-ONLY)
CC:  52-year-old male follows up with bilateral hip pain.  The patient has osteoarthritis of both hips.  Today he states his right is worse than his left.  His pain as a 3/10.  His pain is worse when transitioning from sitting to standing.  He received a left hip injection on 07/19/2022 with some improvement of his pain.  He is having pain with basic daily activity and occasionally pain keeps him up at night.          Past Medical History:   Diagnosis Date    Arthritis      Depression      Eczema      GERD (gastroesophageal reflux disease)      Hypertension      Insomnia      Malignant melanoma of skin, unspecified      Rosacea      Sleep apnea              Past Surgical History:   Procedure Laterality Date    COLONOSCOPY N/A 10/4/2022     Procedure: COLONOSCOPY;  Surgeon: Kyle Choi MD;  Location: [facility] ENDO;  Service: Endoscopy;  Laterality: N/A;    COLONOSCOPY N/A 11/1/2022     Procedure: COLONOSCOPY;  Surgeon: Kyle Choi MD;  Location: [facility] ENDO;  Service: Endoscopy;  Laterality: N/A;    ESOPHAGOGASTRODUODENOSCOPY N/A 9/20/2022     Procedure: EGD (ESOPHAGOGASTRODUODENOSCOPY);  Surgeon: Kyle Choi MD;  Location: [facility] ENDO;  Service: Endoscopy;  Laterality: N/A;    EXCISION OF MELANOMA N/A 03/19/2021     Procedure: EXCISION, MELANOMA;  Surgeon: Tito Irving MD;  Location: [facility] OR;  Service: General;  Laterality: N/A;  excision of melanoma mid back    KNEE ARTHROSCOPY Left      SHOULDER ARTHROSCOPY Right      ULNAR TUNNEL RELEASE Left                  Current Outpatient Medications on File Prior to Visit   Medication Sig Dispense Refill    calcium carbonate (TUMS) 200 mg calcium (500 mg) chewable tablet Take 1 tablet by mouth once daily.        doxycycline (ORACEA) 40 mg capsule Take 40 mg by mouth once daily.        mag/aluminum/sod bicarb/alginc (GAVISCON ORAL) Take by mouth every evening.        meloxicam (MOBIC) 15 MG tablet Take 1 tablet (15 mg total) by mouth once daily. 30  tablet 11    metronidazole 1% (METROGEL) 1 % Gel Apply topically once daily.        multivitamin (THERAGRAN) per tablet Take 1 tablet by mouth.        naproxen sodium (ANAPROX) 220 MG tablet Take 220 mg by mouth.        omega-3 fatty acids/fish oil (FISH OIL-OMEGA-3 FATTY ACIDS) 300-1,000 mg capsule Take by mouth once daily.        pantoprazole (PROTONIX) 40 MG tablet TAKE 1 TABLET(40 MG) BY MOUTH TWICE DAILY 60 tablet 3    prazosin (MINIPRESS) 2 MG Cap Take 1 mg by mouth once daily.        sertraline (ZOLOFT) 25 MG tablet Take 25 mg by mouth once daily.        SODIUM SULFATE MISC by Misc.(Non-Drug; Combo Route) route. topical        telmisartan (MICARDIS) 40 MG Tab Take 1 tablet (40 mg total) by mouth once daily. 90 tablet 3    traZODone (DESYREL) 50 MG tablet Take 50 mg by mouth every evening.        triamcinolone (KENALOG) 0.5 % ointment Apply topically 2 (two) times daily.          No current facility-administered medications on file prior to visit.         ROS:     Constitution: Denies chills, fever, and sweats.  HENT: Denies headaches or blurry vision.  Cardiovascular: Denies chest pain or irregular heart beat.  Respiratory: Denies cough or shortness of breath.  Gastrointestinal: Denies abdominal pain, nausea, or vomiting.  Genitourinary:  Denies urinary incontinence, bladder and kidney issues  Musculoskeletal:  Denies muscle cramps.  Positive for bilateral hip pain right worse than left  Neurological: Denies dizziness or focal weakness.  Psychiatric/Behavioral: Normal mental status.  Hematologic/Lymphatic: Denies bleeding problem or easy bruising/bleeding.  Skin: Denies rash or suspicious lesions.     Physical examination     Gen - No acute distress, well nourished, well groomed   Eyes - Extraoccular motions intact, pupils equally round and reactive to light and accommodation   ENT - normocephalic, atruamtic, oropharynx clear   Neck - Supple, no abnormal masses   Cardiovascular - regular rate and rhythm    Pulmonary - clear to auscultation bilaterally, no wheezes, ronchi, or rales   Abdomen - soft, non-tender, non-distended, positive bowel sounds   Psych - The patient is alert and oriented x3 with normal mood and affect     Examination of the Right Lower Extremity     Skin is intact throughout  Motor in intact EHL,FHL,TA,lesley  +2 DP/PT  Sensation LT intact D/P/1st     Examination of the Right Hip     C-Sign positive  Logroll positive  Stenchfield positive     Pain with ROM positive     ROM:     Flexion                        120  Extension                    10  Abduction                    45  Adduction                    5  External Rotation        35  Internal Rotation         5     Flexion contracture negative     FADIR positive  FADER positive     Tenderness to palpation over lateral and posterolateral greater tochanter negative     Examination of the Left Lower Extremity     Skin is intact throughout  Motor in intact EHL,FHL,TA,lesley  +2 DP/PT  Sensation LT intact D/P/1st     Examination of the Left Hip     C-Sign positive  Logroll positive  Stenchfield positive     Pain with ROM positive     ROM:     Flexion                        120  Extension                    10  Abduction                    45  Adduction                    5  External Rotation        35  Internal Rotation         5     Flexion contracture negative     FADIR positive  FADER negative     Tenderness to palpation over lateral and posterolateral greater tochanter negative     X-ray images were examined and personally interpreted by me.  Three views of bilateral hips dated 01/09/2023 show severe osteoarthritis of both hips with complete loss of joint space and subchondral sclerosis.  The patient's arthritis has progressed since our last set of x-rays on 02/28/2022     Dx:  Severe bilateral hip osteoarthritis, right currently more symptomatic than left     Plan:  Potential morbidity of the right lower extremity with operative and non operative  treatments were discussed.  Risks and benefits of hip replacement were explained.  The patient verbalized understanding and does wish to proceed with right total hip arthroplasty.  We will schedule that for next available date that is convenient for him.    Answers submitted by the patient for this visit:  Orthopedics Questionnaire (Submitted on 2/13/2023)  unexpected weight change: No  appetite change : No  sleep disturbance: Yes  IMMUNOCOMPROMISED: No  nervous/ anxious: Yes  dysphoric mood: Yes  rash: No  visual disturbance: No  eye redness: No  eye pain: No  ear pain: No  tinnitus: Yes  hearing loss: Yes  sinus pressure : No  nosebleeds: No  enviro allergies: Yes  food allergies: No  cough: No  shortness of breath: No  sweating: No  frequency: No  difficulty urinating: No  hematuria: No  chest pain: No  palpitations: No  nausea: No  vomiting: No  diarrhea: No  blood in stool: No  constipation: No  headaches: No  dizziness: No  numbness: No  seizures: No  joint swelling: Yes  myalgia: No  weakness: No  back pain: No   (Submitted on 2/13/2023)  Chief Complaint: Hip pain  Pain Chronicity: chronic  History of trauma: No  Onset: more than 1 year ago  Frequency: constantly  Progression since onset: gradually worsening  Injury mechanism: running  injury location: exercising  pain- numeric: 7/10  pain location: left hip, right hip  pain quality: aching, dull, sharp, tightness, shooting  Radiating Pain: Yes  If your pain is radiating, to what part of the body?: right knee, right thigh  Aggravating factors: activity, bending, exercise, extension, sitting  fever: No  inability to bear weight: Yes  itching: No  joint locking: Yes  limited range of motion: Yes  stiffness: Yes  tingling: No  Treatments tried: cold, heat, exercise, injection treatment, NSAIDs, OTC ointments, OTC pain meds  physical therapy: ineffective  Improvement on treatment: mild

## 2023-02-14 NOTE — PROGRESS NOTES
CC:  52-year-old male follows up with bilateral hip pain.  The patient has osteoarthritis of both hips.  Today he states his right is worse than his left.  His pain as a 3/10.  His pain is worse when transitioning from sitting to standing.  He received a left hip injection on 07/19/2022 with some improvement of his pain.  He is having pain with basic daily activity and occasionally pain keeps him up at night.          Past Medical History:   Diagnosis Date    Arthritis      Depression      Eczema      GERD (gastroesophageal reflux disease)      Hypertension      Insomnia      Malignant melanoma of skin, unspecified      Rosacea      Sleep apnea              Past Surgical History:   Procedure Laterality Date    COLONOSCOPY N/A 10/4/2022     Procedure: COLONOSCOPY;  Surgeon: Kyle Choi MD;  Location: [facility] ENDO;  Service: Endoscopy;  Laterality: N/A;    COLONOSCOPY N/A 11/1/2022     Procedure: COLONOSCOPY;  Surgeon: Kyle Choi MD;  Location: [facility] ENDO;  Service: Endoscopy;  Laterality: N/A;    ESOPHAGOGASTRODUODENOSCOPY N/A 9/20/2022     Procedure: EGD (ESOPHAGOGASTRODUODENOSCOPY);  Surgeon: Kyle Choi MD;  Location: [facility] ENDO;  Service: Endoscopy;  Laterality: N/A;    EXCISION OF MELANOMA N/A 03/19/2021     Procedure: EXCISION, MELANOMA;  Surgeon: Tito Irving MD;  Location: [facility] OR;  Service: General;  Laterality: N/A;  excision of melanoma mid back    KNEE ARTHROSCOPY Left      SHOULDER ARTHROSCOPY Right      ULNAR TUNNEL RELEASE Left                  Current Outpatient Medications on File Prior to Visit   Medication Sig Dispense Refill    calcium carbonate (TUMS) 200 mg calcium (500 mg) chewable tablet Take 1 tablet by mouth once daily.        doxycycline (ORACEA) 40 mg capsule Take 40 mg by mouth once daily.        mag/aluminum/sod bicarb/alginc (GAVISCON ORAL) Take by mouth every evening.        meloxicam (MOBIC) 15 MG tablet Take 1 tablet (15 mg total) by mouth once daily. 30  tablet 11    metronidazole 1% (METROGEL) 1 % Gel Apply topically once daily.        multivitamin (THERAGRAN) per tablet Take 1 tablet by mouth.        naproxen sodium (ANAPROX) 220 MG tablet Take 220 mg by mouth.        omega-3 fatty acids/fish oil (FISH OIL-OMEGA-3 FATTY ACIDS) 300-1,000 mg capsule Take by mouth once daily.        pantoprazole (PROTONIX) 40 MG tablet TAKE 1 TABLET(40 MG) BY MOUTH TWICE DAILY 60 tablet 3    prazosin (MINIPRESS) 2 MG Cap Take 1 mg by mouth once daily.        sertraline (ZOLOFT) 25 MG tablet Take 25 mg by mouth once daily.        SODIUM SULFATE MISC by Misc.(Non-Drug; Combo Route) route. topical        telmisartan (MICARDIS) 40 MG Tab Take 1 tablet (40 mg total) by mouth once daily. 90 tablet 3    traZODone (DESYREL) 50 MG tablet Take 50 mg by mouth every evening.        triamcinolone (KENALOG) 0.5 % ointment Apply topically 2 (two) times daily.          No current facility-administered medications on file prior to visit.         ROS:     Constitution: Denies chills, fever, and sweats.  HENT: Denies headaches or blurry vision.  Cardiovascular: Denies chest pain or irregular heart beat.  Respiratory: Denies cough or shortness of breath.  Gastrointestinal: Denies abdominal pain, nausea, or vomiting.  Genitourinary:  Denies urinary incontinence, bladder and kidney issues  Musculoskeletal:  Denies muscle cramps.  Positive for bilateral hip pain right worse than left  Neurological: Denies dizziness or focal weakness.  Psychiatric/Behavioral: Normal mental status.  Hematologic/Lymphatic: Denies bleeding problem or easy bruising/bleeding.  Skin: Denies rash or suspicious lesions.     Physical examination     Gen - No acute distress, well nourished, well groomed   Eyes - Extraoccular motions intact, pupils equally round and reactive to light and accommodation   ENT - normocephalic, atruamtic, oropharynx clear   Neck - Supple, no abnormal masses   Cardiovascular - regular rate and rhythm    Pulmonary - clear to auscultation bilaterally, no wheezes, ronchi, or rales   Abdomen - soft, non-tender, non-distended, positive bowel sounds   Psych - The patient is alert and oriented x3 with normal mood and affect     Examination of the Right Lower Extremity     Skin is intact throughout  Motor in intact EHL,FHL,TA,lesley  +2 DP/PT  Sensation LT intact D/P/1st     Examination of the Right Hip     C-Sign positive  Logroll positive  Stenchfield positive     Pain with ROM positive     ROM:     Flexion                        120  Extension                    10  Abduction                    45  Adduction                    5  External Rotation        35  Internal Rotation         5     Flexion contracture negative     FADIR positive  FADER positive     Tenderness to palpation over lateral and posterolateral greater tochanter negative     Examination of the Left Lower Extremity     Skin is intact throughout  Motor in intact EHL,FHL,TA,lesley  +2 DP/PT  Sensation LT intact D/P/1st     Examination of the Left Hip     C-Sign positive  Logroll positive  Stenchfield positive     Pain with ROM positive     ROM:     Flexion                        120  Extension                    10  Abduction                    45  Adduction                    5  External Rotation        35  Internal Rotation         5     Flexion contracture negative     FADIR positive  FADER negative     Tenderness to palpation over lateral and posterolateral greater tochanter negative     X-ray images were examined and personally interpreted by me.  Three views of bilateral hips dated 01/09/2023 show severe osteoarthritis of both hips with complete loss of joint space and subchondral sclerosis.  The patient's arthritis has progressed since our last set of x-rays on 02/28/2022     Dx:  Severe bilateral hip osteoarthritis, right currently more symptomatic than left     Plan:  Potential morbidity of the right lower extremity with operative and non operative  treatments were discussed.  Risks and benefits of hip replacement were explained.  The patient verbalized understanding and does wish to proceed with right total hip arthroplasty.  We will schedule that for next available date that is convenient for him.    Answers submitted by the patient for this visit:  Orthopedics Questionnaire (Submitted on 2/13/2023)  unexpected weight change: No  appetite change : No  sleep disturbance: Yes  IMMUNOCOMPROMISED: No  nervous/ anxious: Yes  dysphoric mood: Yes  rash: No  visual disturbance: No  eye redness: No  eye pain: No  ear pain: No  tinnitus: Yes  hearing loss: Yes  sinus pressure : No  nosebleeds: No  enviro allergies: Yes  food allergies: No  cough: No  shortness of breath: No  sweating: No  frequency: No  difficulty urinating: No  hematuria: No  chest pain: No  palpitations: No  nausea: No  vomiting: No  diarrhea: No  blood in stool: No  constipation: No  headaches: No  dizziness: No  numbness: No  seizures: No  joint swelling: Yes  myalgia: No  weakness: No  back pain: No   (Submitted on 2/13/2023)  Chief Complaint: Hip pain  Pain Chronicity: chronic  History of trauma: No  Onset: more than 1 year ago  Frequency: constantly  Progression since onset: gradually worsening  Injury mechanism: running  injury location: exercising  pain- numeric: 7/10  pain location: left hip, right hip  pain quality: aching, dull, sharp, tightness, shooting  Radiating Pain: Yes  If your pain is radiating, to what part of the body?: right knee, right thigh  Aggravating factors: activity, bending, exercise, extension, sitting  fever: No  inability to bear weight: Yes  itching: No  joint locking: Yes  limited range of motion: Yes  stiffness: Yes  tingling: No  Treatments tried: cold, heat, exercise, injection treatment, NSAIDs, OTC ointments, OTC pain meds  physical therapy: ineffective  Improvement on treatment: mild

## 2023-02-15 ENCOUNTER — HOSPITAL ENCOUNTER (OUTPATIENT)
Dept: RADIOLOGY | Facility: HOSPITAL | Age: 53
Discharge: HOME | End: 2023-02-15
Attending: ORTHOPAEDIC SURGERY
Payer: OTHER GOVERNMENT

## 2023-02-15 ENCOUNTER — HOSPITAL ENCOUNTER (OUTPATIENT)
Dept: PREADMISSION TESTING | Facility: HOSPITAL | Age: 53
Discharge: HOME | End: 2023-02-15
Attending: ORTHOPAEDIC SURGERY
Payer: OTHER GOVERNMENT

## 2023-02-15 VITALS — HEIGHT: 70 IN | WEIGHT: 275 LBS | BODY MASS INDEX: 39.37 KG/M2

## 2023-02-15 DIAGNOSIS — Z01.818 PREOP TESTING: ICD-10-CM

## 2023-02-15 DIAGNOSIS — M16.11 PRIMARY OSTEOARTHRITIS OF RIGHT HIP: Primary | ICD-10-CM

## 2023-02-15 DIAGNOSIS — M16.0 PRIMARY OSTEOARTHRITIS OF BOTH HIPS: ICD-10-CM

## 2023-02-15 DIAGNOSIS — Z01.818 PRE-OP EXAM: ICD-10-CM

## 2023-02-15 LAB
ABO + RH BLD: NORMAL
ANION GAP SERPL CALC-SCNC: 11 MMOL/L (ref 8–16)
BASOPHILS # BLD AUTO: 0.1 K/UL (ref 0–0.2)
BASOPHILS NFR BLD: 1.1 % (ref 0–1.9)
BLD GP AB SCN CELLS X3 SERPL QL: NORMAL
BUN SERPL-MCNC: 18 MG/DL (ref 6–20)
CALCIUM SERPL-MCNC: 9.9 MG/DL (ref 8.7–10.5)
CHLORIDE SERPL-SCNC: 102 MMOL/L (ref 95–110)
CO2 SERPL-SCNC: 25 MMOL/L (ref 23–29)
CREAT SERPL-MCNC: 1 MG/DL (ref 0.5–1.4)
DIFFERENTIAL METHOD: NORMAL
EOSINOPHIL # BLD AUTO: 0.2 K/UL (ref 0–0.5)
EOSINOPHIL NFR BLD: 2.5 % (ref 0–8)
ERYTHROCYTE [DISTWIDTH] IN BLOOD BY AUTOMATED COUNT: 12.9 % (ref 11.5–14.5)
EST. GFR  (NO RACE VARIABLE): >60 ML/MIN/1.73 M^2
GLUCOSE SERPL-MCNC: 86 MG/DL (ref 70–110)
HCT VFR BLD AUTO: 48.3 % (ref 40–54)
HGB BLD-MCNC: 16.8 G/DL (ref 14–18)
IMM GRANULOCYTES # BLD AUTO: 0.03 K/UL (ref 0–0.04)
IMM GRANULOCYTES NFR BLD AUTO: 0.3 % (ref 0–0.5)
LYMPHOCYTES # BLD AUTO: 3.1 K/UL (ref 1–4.8)
LYMPHOCYTES NFR BLD: 35.1 % (ref 18–48)
MCH RBC QN AUTO: 29.3 PG (ref 27–31)
MCHC RBC AUTO-ENTMCNC: 34.8 G/DL (ref 32–36)
MCV RBC AUTO: 84 FL (ref 82–98)
MONOCYTES # BLD AUTO: 0.8 K/UL (ref 0.3–1)
MONOCYTES NFR BLD: 8.8 % (ref 4–15)
NEUTROPHILS # BLD AUTO: 4.7 K/UL (ref 1.8–7.7)
NEUTROPHILS NFR BLD: 52.2 % (ref 38–73)
NRBC BLD-RTO: 0 /100 WBC
PLATELET # BLD AUTO: 281 K/UL (ref 150–450)
PMV BLD AUTO: 10 FL (ref 9.2–12.9)
POTASSIUM SERPL-SCNC: 4.4 MMOL/L (ref 3.5–5.1)
RBC # BLD AUTO: 5.73 M/UL (ref 4.6–6.2)
SODIUM SERPL-SCNC: 138 MMOL/L (ref 136–145)
WBC # BLD AUTO: 8.9 K/UL (ref 3.9–12.7)

## 2023-02-15 PROCEDURE — 71046 X-RAY EXAM CHEST 2 VIEWS: CPT | Mod: TC,FY

## 2023-02-15 PROCEDURE — 85025 COMPLETE CBC W/AUTO DIFF WBC: CPT | Performed by: ANESTHESIOLOGY

## 2023-02-15 PROCEDURE — 99900103 DSU ONLY-NO CHARGE-INITIAL HR (STAT)

## 2023-02-15 PROCEDURE — 87081 CULTURE SCREEN ONLY: CPT | Performed by: ANESTHESIOLOGY

## 2023-02-15 PROCEDURE — 80048 BASIC METABOLIC PNL TOTAL CA: CPT | Performed by: ANESTHESIOLOGY

## 2023-02-15 PROCEDURE — 71046 XR CHEST PA AND LATERAL: ICD-10-PCS | Mod: 26,,, | Performed by: RADIOLOGY

## 2023-02-15 PROCEDURE — 93010 EKG 12-LEAD: ICD-10-PCS | Mod: ,,, | Performed by: INTERNAL MEDICINE

## 2023-02-15 PROCEDURE — 93010 ELECTROCARDIOGRAM REPORT: CPT | Mod: ,,, | Performed by: INTERNAL MEDICINE

## 2023-02-15 PROCEDURE — 86900 BLOOD TYPING SEROLOGIC ABO: CPT | Performed by: ANESTHESIOLOGY

## 2023-02-15 PROCEDURE — 93005 ELECTROCARDIOGRAM TRACING: CPT

## 2023-02-15 PROCEDURE — 71046 X-RAY EXAM CHEST 2 VIEWS: CPT | Mod: 26,,, | Performed by: RADIOLOGY

## 2023-02-15 PROCEDURE — 99900104 DSU ONLY-NO CHARGE-EA ADD'L HR (STAT)

## 2023-02-15 RX ORDER — DEXTROMETHORPHAN HYDROBROMIDE, GUAIFENESIN 5; 100 MG/5ML; MG/5ML
650 LIQUID ORAL EVERY 8 HOURS
COMMUNITY

## 2023-02-15 RX ORDER — IBUPROFEN 800 MG/1
800 TABLET ORAL 3 TIMES DAILY
COMMUNITY

## 2023-02-15 NOTE — PRE ADMISSION SCREENING
JOINT CAMP ASSESSMENT    Name Tutu Herndon III   MRN [id number]    Age/Sex 52 y.o. male    Surgeon Thompson Perez   Joint Camp Date 2/15/2023   Surgery Date 3/1/23   Procedure Right hip replacement   Insurance Payor:  / Plan:  [employer] / Product Type: Government /    Care Team Patient Care Team:  Primary Doctor No as PCP - General Karen Bowen LPN as Care Coordinator (Family Medicine)    Pharmacy   [id number] [address] [facility] [address]   [address]  [facility] [address]  Phone: [phone] Fax: [phone]    [facility] [place] - [id number] [place] - [address]  [address] [facility] [place] [address]  Phone: [phone] Fax: [phone]     AM-PAC Score   47.67   Risk Assessment Score 8     Past Medical History:   Diagnosis Date    Arthritis     Depression     Eczema     GERD (gastroesophageal reflux disease)     Hypertension     Insomnia     Malignant melanoma of skin, unspecified     Rosacea     Sleep apnea        Past Surgical History:   Procedure Laterality Date    COLONOSCOPY N/A 10/4/2022    Procedure: COLONOSCOPY;  Surgeon: Kyle Choi MD;  Location: [facility];  Service: Endoscopy;  Laterality: N/A;    COLONOSCOPY N/A 11/1/2022    Procedure: COLONOSCOPY;  Surgeon: Kyle Choi MD;  Location: [facility] ENDO;  Service: Endoscopy;  Laterality: N/A;    ESOPHAGOGASTRODUODENOSCOPY N/A 9/20/2022    Procedure: EGD (ESOPHAGOGASTRODUODENOSCOPY);  Surgeon: Kyle Choi MD;  Location: [facility] ENDO;  Service: Endoscopy;  Laterality: N/A;    EXCISION OF MELANOMA N/A 03/19/2021    Procedure: EXCISION, MELANOMA;  Surgeon: Tito Irving MD;  Location: [facility];  Service: General;  Laterality: N/A;  excision of melanoma mid back    KNEE ARTHROSCOPY Left     SHOULDER ARTHROSCOPY Right     ULNAR TUNNEL RELEASE Left          Home Enviroment     Living Arrangement: Lives with spouse  Home  Environment: 1-story house/ trailer, number of outside stairs: 2 steps at front and one step in back ,  only have tub big jetted garden tub with hand held shower.  Plans to sit outside tub and rinse.  Home Safety Concerns: Pets in the home: dogs (4). 2 inside/outside cats.  Cautioned pt of potential infections with incision/animals.    DISCHARGE CAREGIVER/SUPPORT SYSTEM     Identified post-op caregiver: Patient has spouse / significant other.  Patient's caregiver(s) will be able to provide physical assistance. Patient will have someone to assist overnight.      Caregiver present at pre-op interview:  Yes      PRE-OPERATIVE FUNCTIONAL STATUS     Employment: Employed full time (security)    Pre-op Functional Status: Patient is independent with mobility/ambulation, transfers, ADL's, IADL's.    Use of assistive device for ambulation: none  ADL: self care  ADL Limitations: difficulty with walking  Medical Restrictions: Unstable ambulation    POTENTIAL BARRIERS TO DISCHARGE/POTENTIAL POST-OP COMPLICATIONS   Hypertension, GERD, sleep apnea, depression  Video: watched at home, reviewed education pamphlet.        DISCHARGE PLAN     Expected LOS of 1 days or less for joint replacement discussed with patient. We also discussed a discharge path of HH for approximately two weeks with a transition to outpatient PT on the third week given no post-op complications.  Discussed same day discharge and pt is amenable.    Patient in agreement with discharge plan: Yes    Discharge to: Discharge home with home health (PT/OT) x2 weeks with transition to outpatient PT     HH:  [facility] Patient disclosure form completed and sent to case management for upload to the medical record.      OP PT: [facility] in [place], [place]      Home DME: assistive device kit    Needed DME at D/C: rolling walker and bedside commode     Rx: Per Dr. Perez at discharge     Meds to Beds: Yes  Patient expected to discharge on Aspirin 81mg by mouth  twice daily for DVT prophylaxis. Coumadin Clinic Needed: No

## 2023-02-15 NOTE — PRE ADMISSION SCREENING
Patient Name: Tutu Herndon III  YOB: 1970   MRN: [id number]     [facility]   Basic Mobility Inpatient Short Form 6 Clicks         How much difficulty does the patient currently have  Unable  A Lot  A Little  None      1. Turning over in bed (including adjusting bedclothes, sheets and blankets)?     1 []    2 []    3 []    4 [x]        2. Sitting down on and standing up from a chair with arms (e.g., wheelchair, bedside commode, etc.)     1 []  2 [x]  3 []     4 []      3. Moving from lying on back to sitting on the side of the bed?     1 []  2 []  3 []    4 [x]    How much help from another person does the patient currently need  Total  A Lot  A Little  None      4. Moving to and from a bed to a chair (including a wheelchair)?    1 []  2 []  3 []    4 [x]      5. Need to walk in hospital room?    1 []  2 []  3 [x]    4 []      6. Climbing 3-5 steps with a railing?    1 []  2 []  3 [x]    4 []       Raw Score:     20             CMS 0-100% Score:    35.83        %   Standardized Score:      47.67         CMS Modifier:      CJ                                    [facility]   Basic Mobility Inpatient Short Form 6 Clicks Score Conversion Table*         *Use this form to convert -PAC Basic Mobility Inpatient Raw Scores.   [facility] Inpatient Basic Mobility Short Form Scoring Example   1. Add the number values associated with the response to each item. For example, items totals yield a Raw Score of 21.   2. Match the raw score to the t-Scale scores (t-Scale score = 50.25, SE = 4.69).   3. Find the associated CMS % (CMS % = 28.97%).   4. Locate the correct CMS Functional Modifier Code, or G Code (G code = CJ)     NOTE: Each -PAC Short Form has a separate conversion table. Make sure that you use the correct conversion table.       Instruction Manual - page 45 contains conversion table

## 2023-02-15 NOTE — PRE ADMISSION SCREENING
"               [facility] Risk Assessment Scale    Patient Name: Tutu Herndon III  YOB: 1970  MRN: [id number]            RIsk Factor Measure Recommendation Patient Data Scale/Score   BMI >40 Reconsider surgery, weight loss   Estimated body mass index is 39.51 kg/m² as calculated from the following:    Height as of 2/14/23: 5' 10" (1.778 m).    Weight as of 2/14/23: 124.9 kg (275 lb 5.7 oz).   [] 0 = 1 - 24.9  [] 1 = 25-29.9  [] 2 = 30-34.9  [x] 3 = 35-39.9  [] 4 = 40-44.9  [] 5 = 45-99.9   Hemoglobin AIC (if applicable) >9 Delay surgery until DM under control  Refer for:  Nutrition Therapy  Exercise   Medication    No results found for: LABA1C, HGBA1C    Lab Results   Component Value Date    GLU 86 02/15/2023      [x] 0 = 4.0-5.6  [] 1 = 5.7-6.4  [] 2 = 6.5-6.9  [] 3 = 7.0-7.9  [] 4 = 8.0-8.9  [] 5 = 9.0-12   Hemoglobin (Anemia) <9 Delay surgery   Correct anemia Lab Results   Component Value Date    HGB 16.8 02/15/2023    [] 20 - <9.0                    Albumin <3 Delay surgery &Workup Lab Results   Component Value Date    ALBUMIN 3.9 10/19/2021    [] 20 - <3.0   Smoking Cessation >4 Weeks Delay Surgery  Refer to OP Cessation Class    NA [] 20 - current smoker                                _____ PPD                    Hx of MI, PE, Arrhythmia, CVA, DVT <30 Days Delay Surgery    NA [] 20      Infection Variable Delay surgery and re-evaluate   NA [] 20 - recent/current infection     Depression (PHQ) >10 out of 27 Delay Surgery and re-evaluate  Medication  Counseling              [] 0     [x]1     []2     []3      []4      [] 5                    (1-4)      (5-9)  (10-14)  (15-19)   (20-27)  Under control with medication   Memory Impairment & Memory loss (Mini-Cog Screening Tool) Advanced dementia and/or Parkinson's Reconsider surgery     [x] 0     []1     []2     []3     []4     [] 5     Physical Conditioning (Modified AM-PAC Per Physical Therapy at [facility]) Unable to ambulate on day of surgery Delay " surgery and re-evaluate  Pre-Rehabilitation   (PT evaluation)       []  0   [x]4       []8     []12        []16     []20       (<20%)   (<40%)   (<60%)   (<80% )    (>80%)     Home Environment/Caregiver support  (Per /Navigator Interview)    Availability of basic services and/or approprate assistance during post-operative period Delay surgery and re-evaluate  Safe home environment  Health   1 week post-surgery  Transportation  availability  Ability to obtain DME/Medications post-op    [x] 0     []1     []2     []3     []4     [] 5  [x] 0     []1     []2     []3     []4     [] 5  [x] 0     []1     []2     []3     []4     [] 5  [x] 0     []1     []2     []3     []4     [] 5         MD Contact:  Comments:  Total Score:  8

## 2023-02-15 NOTE — DISCHARGE INSTRUCTIONS
To confirm, Your doctor has instructed you that surgery is scheduled for: 3/1/23    Please report to [facility], Registration the morning of surgery. You must check-in and receive a wristband before going to your procedure.    Pre-Op will call the afternoon prior to surgery between 1:00 and 6:00 PM with the final arrival time.  Phone number: [phone]    PLEASE NOTE:  The surgery schedule has many variables which may affect the time of your surgery case.  Family members should be available if your surgery time changes.  Plan to be here the day of your procedure between 4-6 hours.    MEDICATIONS:  TAKE ONLY THESE MEDICATIONS WITH A SMALL SIP OF WATER THE MORNING OF YOUR PROCEDURE:    SEE MED LIST        DO NOT TAKE THESE MEDICATIONS 5-7 DAYS PRIOR to your procedure or per your surgeon's request:   ASPIRIN, ALEVE, ADVIL, IBUPROFEN, FISH OIL VITAMIN E, HERBALS  (May take Tylenol)    ONLY if you are prescribed any types of blood thinners such as:  Aspirin, Coumadin, Plavix, Pradaxa, Xarelto, Aggrenox, Effient, Eliquis, Savasya, Brilinta, or any other, ask your surgeon whether you should stop taking them and how long before surgery you should stop.  You may also need to verify with the prescribing physician if it is ok to stop your medication.      INSTRUCTIONS IMPORTANT!!  Do not eat or drink anything between midnight and the time of your procedure- this includes gum, mints, and candy.  Do not smoke or drink alcoholic beverages 24 hours prior to your procedure.  Shower the night before AND the morning of your procedure with a Chlorhexidine wash such as Hibiclens or Dial antibacterial soap from the neck down.  Do not get it on your face or in your eyes.  You may use your own shampoo and face wash. This helps your skin to be as bacteria free as possible.    If you wear contact lenses, dentures, hearing aids or glasses, bring a container to put them in during surgery and give to a family member  for safe keeping.  Please leave all jewelry, piercing's and valuables at home.   DO NOT remove hair from the surgery site.  Do not shave the incision site unless you are given specific instructions to do so.    ONLY if you have been diagnosed with sleep apnea please bring your C-PAP machine.  ONLY if you wear home oxygen please bring your portable oxygen tank the day of your procedure.  ONLY if you have a history of OPEN HEART SURGERY you will need a clearance from your Cardiologist per Anesthesia.      ONLY for patients requiring bowel prep, written instructions will be given by your doctor's office.  ONLY if you have a neuro stimulator, please bring the controller with you the morning of surgery  ONLY if a type and screen test is needed before surgery, please return:  If your doctor has scheduled you for an overnight stay, bring a small overnight bag with any personal items you need.  Make arrangements in advance for transportation home by a responsible adult.  It is not safe to drive a vehicle during the 24 hours after anesthesia.      [facility] Visitor Policy    Effective September 26, 2022    [facility] will resume routine visitation for COVID-19 negative patients, including inpatients, outpatients, and procedural areas, in accordance with local campus procedures.    All [facility] facilities and properties are tobacco free.  Smoking is NOT allowed.   If you have any questions about these instructions, call Pre-Op Admit  Nursing at [phone] or the Pre-Op Day Surgery Unit at [phone].

## 2023-02-17 DIAGNOSIS — M16.11 PRIMARY OSTEOARTHRITIS OF RIGHT HIP: Primary | ICD-10-CM

## 2023-02-17 LAB — MRSA SPEC QL CULT: NORMAL

## 2023-02-20 DIAGNOSIS — Z96.641 S/P HIP REPLACEMENT, RIGHT: Primary | ICD-10-CM

## 2023-02-27 ENCOUNTER — PATIENT MESSAGE (OUTPATIENT)
Dept: ORTHOPEDICS | Facility: CLINIC | Age: 53
End: 2023-02-27
Payer: OTHER GOVERNMENT

## 2023-02-28 ENCOUNTER — ANESTHESIA EVENT (OUTPATIENT)
Dept: SURGERY | Facility: HOSPITAL | Age: 53
End: 2023-02-28
Payer: OTHER GOVERNMENT

## 2023-02-28 RX ORDER — ASPIRIN 81 MG/1
81 TABLET ORAL 2 TIMES DAILY
Qty: 14 TABLET | Refills: 0 | Status: SHIPPED | OUTPATIENT
Start: 2023-02-28 | End: 2024-02-28

## 2023-02-28 RX ORDER — HYDROCODONE BITARTRATE AND ACETAMINOPHEN 7.5; 325 MG/1; MG/1
1 TABLET ORAL EVERY 6 HOURS PRN
Qty: 28 TABLET | Refills: 0 | Status: SHIPPED | OUTPATIENT
Start: 2023-02-28 | End: 2023-09-26

## 2023-03-01 ENCOUNTER — HOSPITAL ENCOUNTER (OUTPATIENT)
Facility: HOSPITAL | Age: 53
Discharge: HOME | End: 2023-03-01
Attending: ORTHOPAEDIC SURGERY | Admitting: ORTHOPAEDIC SURGERY
Payer: OTHER GOVERNMENT

## 2023-03-01 ENCOUNTER — ANESTHESIA (OUTPATIENT)
Dept: SURGERY | Facility: HOSPITAL | Age: 53
End: 2023-03-01
Payer: OTHER GOVERNMENT

## 2023-03-01 DIAGNOSIS — M16.0 PRIMARY OSTEOARTHRITIS OF BOTH HIPS: Primary | ICD-10-CM

## 2023-03-01 DIAGNOSIS — Z01.818 PREOP TESTING: ICD-10-CM

## 2023-03-01 PROCEDURE — 36000710: Performed by: ORTHOPAEDIC SURGERY

## 2023-03-01 PROCEDURE — D9220A PRA ANESTHESIA: Mod: CRNA,,, | Performed by: NURSE ANESTHETIST, CERTIFIED REGISTERED

## 2023-03-01 PROCEDURE — 27130 PR TOTAL HIP ARTHROPLASTY: ICD-10-PCS | Mod: RT,,, | Performed by: ORTHOPAEDIC SURGERY

## 2023-03-01 PROCEDURE — 97116 GAIT TRAINING THERAPY: CPT

## 2023-03-01 PROCEDURE — 97161 PT EVAL LOW COMPLEX 20 MIN: CPT

## 2023-03-01 PROCEDURE — 64450 NJX AA&/STRD OTHER PN/BRANCH: CPT | Mod: 59,RT,, | Performed by: ANESTHESIOLOGY

## 2023-03-01 PROCEDURE — 97530 THERAPEUTIC ACTIVITIES: CPT

## 2023-03-01 PROCEDURE — D9220A PRA ANESTHESIA: ICD-10-PCS | Mod: CRNA,,, | Performed by: NURSE ANESTHETIST, CERTIFIED REGISTERED

## 2023-03-01 PROCEDURE — 01214 ANES OPEN PX TOT HIP ARTHRP: CPT | Performed by: ORTHOPAEDIC SURGERY

## 2023-03-01 PROCEDURE — 36000711: Performed by: ORTHOPAEDIC SURGERY

## 2023-03-01 PROCEDURE — 76942 ECHO GUIDE FOR BIOPSY: CPT | Performed by: ANESTHESIOLOGY

## 2023-03-01 PROCEDURE — C1776 JOINT DEVICE (IMPLANTABLE): HCPCS | Performed by: ORTHOPAEDIC SURGERY

## 2023-03-01 PROCEDURE — 64450 PENG SS: ICD-10-PCS | Mod: 59,RT,, | Performed by: ANESTHESIOLOGY

## 2023-03-01 PROCEDURE — D9220A PRA ANESTHESIA: ICD-10-PCS | Mod: ANES,,, | Performed by: ANESTHESIOLOGY

## 2023-03-01 PROCEDURE — 76942 PENG SS: ICD-10-PCS | Mod: 26,,, | Performed by: ANESTHESIOLOGY

## 2023-03-01 PROCEDURE — 71000039 HC RECOVERY, EACH ADD'L HOUR: Performed by: ORTHOPAEDIC SURGERY

## 2023-03-01 PROCEDURE — 99900103 DSU ONLY-NO CHARGE-INITIAL HR (STAT): Performed by: ORTHOPAEDIC SURGERY

## 2023-03-01 PROCEDURE — 25000003 PHARM REV CODE 250: Performed by: ORTHOPAEDIC SURGERY

## 2023-03-01 PROCEDURE — 25000003 PHARM REV CODE 250: Performed by: ANESTHESIOLOGY

## 2023-03-01 PROCEDURE — C1713 ANCHOR/SCREW BN/BN,TIS/BN: HCPCS | Performed by: ORTHOPAEDIC SURGERY

## 2023-03-01 PROCEDURE — 63600175 PHARM REV CODE 636 W HCPCS

## 2023-03-01 PROCEDURE — 97165 OT EVAL LOW COMPLEX 30 MIN: CPT

## 2023-03-01 PROCEDURE — 37000008 HC ANESTHESIA 1ST 15 MINUTES: Performed by: ORTHOPAEDIC SURGERY

## 2023-03-01 PROCEDURE — 71000015 HC POSTOP RECOV 1ST HR: Performed by: ORTHOPAEDIC SURGERY

## 2023-03-01 PROCEDURE — 71000033 HC RECOVERY, INTIAL HOUR: Performed by: ORTHOPAEDIC SURGERY

## 2023-03-01 PROCEDURE — 71000016 HC POSTOP RECOV ADDL HR: Performed by: ORTHOPAEDIC SURGERY

## 2023-03-01 PROCEDURE — 99900104 DSU ONLY-NO CHARGE-EA ADD'L HR (STAT): Performed by: ORTHOPAEDIC SURGERY

## 2023-03-01 PROCEDURE — 94799 UNLISTED PULMONARY SVC/PX: CPT

## 2023-03-01 PROCEDURE — 70130: ICD-10-PCS | Mod: 26,RT,, | Performed by: ORTHOPAEDIC SURGERY

## 2023-03-01 PROCEDURE — 27200688 HC TRAY, SPINAL-HYPER/ ISOBARIC: Performed by: ANESTHESIOLOGY

## 2023-03-01 PROCEDURE — 63600175 PHARM REV CODE 636 W HCPCS: Performed by: NURSE ANESTHETIST, CERTIFIED REGISTERED

## 2023-03-01 PROCEDURE — 25000003 PHARM REV CODE 250: Performed by: NURSE ANESTHETIST, CERTIFIED REGISTERED

## 2023-03-01 PROCEDURE — 27200750 HC INSULATED NEEDLE/ STIMUPLEX: Performed by: ANESTHESIOLOGY

## 2023-03-01 PROCEDURE — 97535 SELF CARE MNGMENT TRAINING: CPT

## 2023-03-01 PROCEDURE — D9220A PRA ANESTHESIA: Mod: ANES,,, | Performed by: ANESTHESIOLOGY

## 2023-03-01 PROCEDURE — 27201423 OPTIME MED/SURG SUP & DEVICES STERILE SUPPLY: Performed by: ORTHOPAEDIC SURGERY

## 2023-03-01 PROCEDURE — 70130 X-RAY EXAM OF MASTOIDS: CPT | Mod: 26,RT,, | Performed by: ORTHOPAEDIC SURGERY

## 2023-03-01 PROCEDURE — 27130 TOTAL HIP ARTHROPLASTY: CPT | Mod: RT,,, | Performed by: ORTHOPAEDIC SURGERY

## 2023-03-01 PROCEDURE — 37000009 HC ANESTHESIA EA ADD 15 MINS: Performed by: ORTHOPAEDIC SURGERY

## 2023-03-01 DEVICE — COBALT CHROME 12/14 TAPER FEMORAL                                    HEAD 36MM +0: Type: IMPLANTABLE DEVICE | Site: HIP | Status: FUNCTIONAL

## 2023-03-01 DEVICE — SYNERGY POROUS PLUS HYDROXYAPATITE                                    HIGH OFFSET SIZE 15
Type: IMPLANTABLE DEVICE | Site: HIP | Status: FUNCTIONAL
Brand: SYNERGY

## 2023-03-01 DEVICE — REFLECTION THREADED HOLE COVER
Type: IMPLANTABLE DEVICE | Site: HIP | Status: FUNCTIONAL
Brand: REFLECTION

## 2023-03-01 DEVICE — R3 3 HOLE ACETABULAR SHELL 60MM
Type: IMPLANTABLE DEVICE | Site: HIP | Status: FUNCTIONAL
Brand: R3 ACETABULAR

## 2023-03-01 RX ORDER — ONDANSETRON HYDROCHLORIDE 2 MG/ML
INJECTION, SOLUTION INTRAMUSCULAR; INTRAVENOUS
Status: DISCONTINUED | OUTPATIENT
Start: 2023-03-01 | End: 2023-03-01

## 2023-03-01 RX ORDER — FENTANYL CITRATE 50 UG/ML
25 INJECTION, SOLUTION INTRAMUSCULAR; INTRAVENOUS EVERY 5 MIN PRN
Status: DISCONTINUED | OUTPATIENT
Start: 2023-03-01 | End: 2023-03-01 | Stop reason: HOSPADM

## 2023-03-01 RX ORDER — ONDANSETRON 2 MG/ML
4 INJECTION INTRAMUSCULAR; INTRAVENOUS ONCE AS NEEDED
Status: DISCONTINUED | OUTPATIENT
Start: 2023-03-01 | End: 2023-03-01 | Stop reason: HOSPADM

## 2023-03-01 RX ORDER — OXYCODONE HYDROCHLORIDE 5 MG/1
5 TABLET ORAL ONCE AS NEEDED
Status: DISCONTINUED | OUTPATIENT
Start: 2023-03-01 | End: 2023-03-01 | Stop reason: HOSPADM

## 2023-03-01 RX ORDER — MUPIROCIN 20 MG/G
OINTMENT TOPICAL
Status: DISCONTINUED | OUTPATIENT
Start: 2023-03-01 | End: 2023-03-01 | Stop reason: HOSPADM

## 2023-03-01 RX ORDER — TRANEXAMIC ACID 100 MG/ML
INJECTION, SOLUTION INTRAVENOUS
Status: DISCONTINUED | OUTPATIENT
Start: 2023-03-01 | End: 2023-03-01

## 2023-03-01 RX ORDER — CELECOXIB 100 MG/1
200 CAPSULE ORAL ONCE
Status: CANCELLED | OUTPATIENT
Start: 2023-03-01

## 2023-03-01 RX ORDER — ACETAMINOPHEN 10 MG/ML
INJECTION, SOLUTION INTRAVENOUS
Status: DISCONTINUED | OUTPATIENT
Start: 2023-03-01 | End: 2023-03-01

## 2023-03-01 RX ORDER — CLINDAMYCIN PHOSPHATE 900 MG/50ML
900 INJECTION, SOLUTION INTRAVENOUS
Status: COMPLETED | OUTPATIENT
Start: 2023-03-01 | End: 2023-03-01

## 2023-03-01 RX ORDER — PHENYLEPHRINE HYDROCHLORIDE 10 MG/ML
INJECTION INTRAVENOUS
Status: DISCONTINUED | OUTPATIENT
Start: 2023-03-01 | End: 2023-03-01

## 2023-03-01 RX ORDER — OXYCODONE HCL 10 MG/1
10 TABLET, FILM COATED, EXTENDED RELEASE ORAL ONCE
Status: COMPLETED | OUTPATIENT
Start: 2023-03-01 | End: 2023-03-01

## 2023-03-01 RX ORDER — BUPIVACAINE HYDROCHLORIDE 5 MG/ML
INJECTION, SOLUTION EPIDURAL; INTRACAUDAL
Status: COMPLETED | OUTPATIENT
Start: 2023-03-01 | End: 2023-03-01

## 2023-03-01 RX ORDER — MIDAZOLAM HYDROCHLORIDE 1 MG/ML
INJECTION INTRAMUSCULAR; INTRAVENOUS
Status: DISCONTINUED | OUTPATIENT
Start: 2023-03-01 | End: 2023-03-01

## 2023-03-01 RX ORDER — ACETAMINOPHEN 500 MG
1000 TABLET ORAL
Status: COMPLETED | OUTPATIENT
Start: 2023-03-01 | End: 2023-03-01

## 2023-03-01 RX ORDER — CELECOXIB 100 MG/1
400 CAPSULE ORAL ONCE
Status: COMPLETED | OUTPATIENT
Start: 2023-03-01 | End: 2023-03-01

## 2023-03-01 RX ORDER — PROPOFOL 10 MG/ML
VIAL (ML) INTRAVENOUS CONTINUOUS PRN
Status: DISCONTINUED | OUTPATIENT
Start: 2023-03-01 | End: 2023-03-01

## 2023-03-01 RX ORDER — LIDOCAINE HYDROCHLORIDE 10 MG/ML
1 INJECTION, SOLUTION EPIDURAL; INFILTRATION; INTRACAUDAL; PERINEURAL ONCE
Status: DISCONTINUED | OUTPATIENT
Start: 2023-03-01 | End: 2023-03-01 | Stop reason: HOSPADM

## 2023-03-01 RX ORDER — FENTANYL CITRATE 50 UG/ML
INJECTION, SOLUTION INTRAMUSCULAR; INTRAVENOUS
Status: DISCONTINUED | OUTPATIENT
Start: 2023-03-01 | End: 2023-03-01

## 2023-03-01 RX ORDER — CELECOXIB 100 MG/1
200 CAPSULE ORAL ONCE
Status: COMPLETED | OUTPATIENT
Start: 2023-03-01 | End: 2023-03-01

## 2023-03-01 RX ADMIN — GLYCOPYRROLATE 0.1 MG: 0.2 INJECTION, SOLUTION INTRAMUSCULAR; INTRAVITREAL at 08:03

## 2023-03-01 RX ADMIN — MIDAZOLAM HYDROCHLORIDE 0.5 MG: 1 INJECTION, SOLUTION INTRAMUSCULAR; INTRAVENOUS at 09:03

## 2023-03-01 RX ADMIN — SODIUM CHLORIDE, SODIUM GLUCONATE, SODIUM ACETATE, POTASSIUM CHLORIDE AND MAGNESIUM CHLORIDE: 526; 502; 368; 37; 30 INJECTION, SOLUTION INTRAVENOUS at 07:03

## 2023-03-01 RX ADMIN — MUPIROCIN: 20 OINTMENT TOPICAL at 07:03

## 2023-03-01 RX ADMIN — MIDAZOLAM HYDROCHLORIDE 0.5 MG: 1 INJECTION, SOLUTION INTRAMUSCULAR; INTRAVENOUS at 10:03

## 2023-03-01 RX ADMIN — CELECOXIB 200 MG: 100 CAPSULE ORAL at 02:03

## 2023-03-01 RX ADMIN — TRANEXAMIC ACID 1000 MG: 100 INJECTION, SOLUTION INTRAVENOUS at 08:03

## 2023-03-01 RX ADMIN — PHENYLEPHRINE HYDROCHLORIDE 100 MCG: 10 INJECTION INTRAVENOUS at 09:03

## 2023-03-01 RX ADMIN — MIDAZOLAM HYDROCHLORIDE 1 MG: 1 INJECTION, SOLUTION INTRAMUSCULAR; INTRAVENOUS at 08:03

## 2023-03-01 RX ADMIN — PROPOFOL 50 MCG/KG/MIN: 10 INJECTION, EMULSION INTRAVENOUS at 08:03

## 2023-03-01 RX ADMIN — PHENYLEPHRINE HYDROCHLORIDE 100 MCG: 10 INJECTION INTRAVENOUS at 08:03

## 2023-03-01 RX ADMIN — OXYCODONE HYDROCHLORIDE 10 MG: 10 TABLET, FILM COATED, EXTENDED RELEASE ORAL at 07:03

## 2023-03-01 RX ADMIN — PHENYLEPHRINE HYDROCHLORIDE 20 MCG/MIN: 10 INJECTION INTRAVENOUS at 08:03

## 2023-03-01 RX ADMIN — FENTANYL CITRATE 50 MCG: 0.05 INJECTION, SOLUTION INTRAMUSCULAR; INTRAVENOUS at 08:03

## 2023-03-01 RX ADMIN — ONDANSETRON 4 MG: 2 INJECTION INTRAMUSCULAR; INTRAVENOUS at 08:03

## 2023-03-01 RX ADMIN — MIDAZOLAM HYDROCHLORIDE 2 MG: 1 INJECTION, SOLUTION INTRAMUSCULAR; INTRAVENOUS at 08:03

## 2023-03-01 RX ADMIN — CLINDAMYCIN PHOSPHATE 900 MG: 18 INJECTION, SOLUTION INTRAVENOUS at 08:03

## 2023-03-01 RX ADMIN — BUPIVACAINE HYDROCHLORIDE 30 ML: 5 INJECTION, SOLUTION EPIDURAL; INTRACAUDAL; PERINEURAL at 08:03

## 2023-03-01 RX ADMIN — CELECOXIB 400 MG: 100 CAPSULE ORAL at 07:03

## 2023-03-01 RX ADMIN — ACETAMINOPHEN 1000 MG: 10 INJECTION, SOLUTION INTRAVENOUS at 08:03

## 2023-03-01 RX ADMIN — ACETAMINOPHEN 1000 MG: 500 TABLET ORAL at 07:03

## 2023-03-01 NOTE — PLAN OF CARE
Pt awake, alert, oriented. Vital signs stable. Pt reports having all necessary DME. Cleared for discharge by PT and OT. Discharge instructions reviewed with pt and pt's spouse. Pt and pt's spouse verbalized understanding. IV removed, catheter intact. Dressing to R hip clean, dry, and intact. All belongings returned to patient. Pt transferred to car via wheelchair. Safety maintained.

## 2023-03-01 NOTE — OP NOTE
[facility]  Orthopedic Surgery Department  Operative Note    SUMMARY     Date of Procedure: 3/1/2023     Procedure: Procedure(s) (LRB):  ARTHROPLASTY, HIP (Right)     Surgeon(s) and Role:     * Thompson Perez II, MD - Primary    Assisting Surgeon: None    First Assist:  JURGEN Mendez    Pre-Operative Diagnosis: Primary osteoarthritis of both hips [M16.0]  Preop testing [Z01.818]    Post-Operative Diagnosis: Post-Op Diagnosis Codes:     * Primary osteoarthritis of both hips [M16.0]     * Preop testing [Z01.818]    Anesthesia: Spinal    Technical Procedures Used: Right total hip arthroplasty    Description of the Findings of the Procedure: Dictated    Significant Surgical Tasks Conducted by the Assistant(s), if Applicable: Positioning and prepping the patient, retraction during exposure and implant insertion, wound closure and implant insertion    Complications: No    Estimated Blood Loss (EBL): * No values recorded between 3/1/2023  9:27 AM and 3/1/2023 10:42 AM *           Implants:   Implant Name Type Inv. Item Serial No.  Lot No. LRB No. Used Action   COVER REFLECTION I APEX THRD H - [id number]  COVER REFLECTION I APEX THRD H  [employer] [id number] Right 1 Implanted   60 mm OD 3 HOLE COATED SHELL    [employer] [id number] Right 1 Implanted   STEM SYN HO FEM POR 165MM 15 - [id number]  STEM SYN HO FEM POR 165MM 15  [employer] [id number] Right 1 Implanted   HEAD FEM TAPER 12/14 36MM +0 - [id number]  HEAD FEM TAPER 12/14 36MM +0  [employer] [id number] Right 1 Implanted   36 MM I.D., 60 MM O.D.    [employer] [id number] Right 1 Implanted       Specimens:   Specimen (24h ago, onward)      None                    Condition: Good    Disposition: PACU - hemodynamically stable.    Attestation: I was present for the entire procedure.    Procedure In Detail:      Procedure in Detail:  The patient was brought to the operating room and placed on the table in the supine  position.  They underwent anesthesia with the anesthesia service.  They were then rolled into the Left lateral decubitus position with the Right hip up and secured with the peg board.  The Right hip was then prepped and draped in the normal sterile fashion.  A posterior approach to the hip was created.  Dissection was taken down through skin and subcutaneous tissue to the tensor fascia clarita and fascia over the gluteus elsy.  The fascia was sharply incised and then blunt finger dissection was taken through the elsy musculature.  A Charnley retractor was placed.  The bursa was swept posteriorly.  The sciatic nerve was palpated and protected through out the remainder of the case.      The interval between the piriformis and the gluteus minimus was developed with a garza elevator.  The piriformis and other short external rotators were then taken as one cuff of tissue along with the hip capsule and tagged with a #5 Ethibond for later repair.  This lead to excellent exposure of the hip joint which was noted to be severely arthritic.  The hip was dislocated and the femoral neck was exposed.  A austin was made on the femoral neck with the bovie at 20mm proximal to the top of the lessor troachanter, based on pre-operative measurements.  The femoral neck cut was made and the head removed.  Hohmann retractors were placed anterior and posterior to the acetabulum and the femur was retracted anteriorly.  A capsulotomy was performed.  This lead to excellent exposure of the acetabulum.  The remnant of the labrum was resected and the cotyloid notch was cleared of soft tissue.  The transverse acetabular ligament was identified.  Sequential reaming was then undertaken to size 60mm.  A 60 mm trial was noted to fit and fill the acetabulum appropriately.  A 60 mm Smith and Nephew R3 cup was then impacted in the acetabulum.  When fully seated the entire pelvis with rock when attempting to move the cup.  A hole cover was placed along  with a trial liner and then attention was turned to the femur.    A box osteotome was used open the proximal femur.  A canal finding Reamer followed by lateralizing Reamer was then used.  Sequential reaming was then undertaken to size 15.  Sequential broaching was then undertaken to size 15.  With the broach in place we then trialed different head neck combinations.  With a high offset neck and a +0 head leg lengths clinically appeared equal.  There was no excessive Shuck.  The hip could be flexed to 90° and internally rotated to 90 degrees before subluxation.  We felt this to be a good stable construct.  The trials were then removed.  A neutral liner was impacted into the acetabular shell.  A size 15 the Smith and Nephew a Synergy stem was then impacted into the femur.  A +0 head was then impacted onto the neck of the stem.  The hip was then reduced.  With the final components in place the hip had the same stability and range of motion as it did with the trials.  The hip was then placed in neutral position irrigated with normal saline and then closure was begun.    The short external rotators and hip capsule repaired to the medius tendon with Ethibond suture.  We then closed the fascia with a 0 PDS strata fix.  We closed deep to the subdermal layer with a 2 0 PDS strata fix and the subcuticular layer was closed with 3-0 Monocryl.  A Dermabond device was applied to the skin.  Once that had dried, a sterile Bioclusive intraoperative dressing was placed.  The patient was then rolled supine on the transport gurney with a hip abduction pillow in place.  A polar Care device was applied for postoperative pain control.  X-rays obtained in the operating room showed a well-fixed implant in good alignment with equal leg lengths.  The patient was then taken recovery where they were noted to be stable postoperatively.  Needle and lap counts were correct at the end of the case.

## 2023-03-01 NOTE — PLAN OF CARE
, April, at bedside updating pt and pt's spouse regarding DME. Per , larger BSC can be delivered to pt's home tomorrow, pending pt's co-payment through mobile number  provided. Pt and pt's spouse to call for payment. Pt verbalized he no longer wanted a tub transfer bench.

## 2023-03-01 NOTE — ANESTHESIA PREPROCEDURE EVALUATION
03/01/2023  Tutu Herndon III is a 52 y.o., male.      Pre-op Assessment    I have reviewed the NPO Status.   I have reviewed the Medications.     Review of Systems  Anesthesia Hx:  No problems with previous Anesthesia    Social:  Non-Smoker    Cardiovascular:   Exercise tolerance: good Hypertension ECG has been reviewed.    Pulmonary:   Sleep Apnea, CPAP    Education provided regarding risk of obstructive sleep apnea     Hepatic/GI:   GERD, well controlled    Neurological:  Neurology Normal    Endocrine:  Morbid Obesity / BMI > 40  Psych:   Psychiatric History depression          Physical Exam  General: Well nourished    Airway:  Mallampati: III / II  Mouth Opening: Normal  Neck ROM: Normal ROM        Anesthesia Plan  Type of Anesthesia, risks & benefits discussed:    Anesthesia Type: Spinal, Gen ETT, Regional  Intra-op Monitoring Plan: Standard ASA Monitors  Post Op Pain Control Plan: multimodal analgesia, IV/PO Opioids PRN and peripheral nerve block  Induction:  IV  Informed Consent: Informed consent signed with the Patient and all parties understand the risks and agree with anesthesia plan.  All questions answered. Patient consented to blood products? Yes  ASA Score: 3    Ready For Surgery From Anesthesia Perspective.     .

## 2023-03-01 NOTE — PT/OT/SLP EVAL
Physical Therapy Evaluation and Discharge Note    Patient Name:  Tutu Herndon III   MRN:  [id number]    Recommendations:     Discharge Recommendations: home health PT  Discharge Equipment Recommendations: none (has RW)   Barriers to discharge: None    Assessment:     Tutu Herndon III is a 52 y.o. male admitted with a medical diagnosis of Primary osteoarthritis of both hips. . Pt seen at 508, alert, motivated. Pt able to name all posterior hip precautions. Pt has no abductor pillow and advised to utilize a body pillow for precaution. Pt requiring min to CGA with mobility and ambulated ~500ft with RW with CGA with WBAT RLE.  OOB chair post PT. Will benefit from HHPT    Recent Surgery: Procedure(s) (LRB):  ARTHROPLASTY, HIP (Right) Day of Surgery    Plan:     During this hospitalization, patient does not require further acute PT services.  Please re-consult if situation changes.      Subjective   Pt stated feeling well- with pain 1/10   Chief Complaint: thirsty  Patient/Family Comments/goals: get well  Pain/Comfort:  Pain Rating 1: 1/10  Location - Side 1: Right  Location 1: hip  Pain Addressed 1: Reposition, Distraction, Cessation of Activity    Patients cultural, spiritual, Hinduism conflicts given the current situation:      Living Environment:  Home with spouse  Prior to admission, patients level of function was ambulatory and indep.  Equipment used at home: none.  DME owned (not currently used): rolling walker.  Upon discharge, patient will have assistance from family.    Objective:     Communicated with nurse Jurado and SAM Mendenhall prior to session.  Patient found HOB elevated with   upon PT entry to room.    General Precautions: Standard, fall    Orthopedic Precautions:RLE weight bearing as tolerated, RLE posterior precautions   Braces:    Respiratory Status: Room air    Exams:  RLE ROM: Deficits: within R posterior hip precautions  RLE Strength: Deficits: 3/5  LLE ROM: WFL  LLE Strength:  WFL    Functional Mobility:  Bed Mobility:     Scooting: contact guard assistance  Supine to Sit: contact guard assistance  Transfers:     Sit to Stand:  contact guard assistance with rolling walker  Bed to Chair: contact guard assistance with  rolling walker  using  Stand Pivot  Gait: 500ft with RW CGA    AM-PAC 6 CLICK MOBILITY  Total Score:16       Treatment and Education:  Patient was educated on the importance of OOB activity and functional mobility to negate negative effects of prolonged bed rest during hospitalization, safe transfers and ambulation, and D/C planning   Pt completed AP,QS/GS  Reviewed on R posterior hip precautions  OOB chair post PT    AM-PAC 6 CLICK MOBILITY  Total Score:16     Patient left up in chair with all lines intact, call button in reach, nurse Rhiannon notified, and spouse present.    GOALS:   Multidisciplinary Problems       Physical Therapy Goals       Not on file                    History:     Past Medical History:   Diagnosis Date    Arthritis     Depression     Eczema     GERD (gastroesophageal reflux disease)     Hypertension     Insomnia     Malignant melanoma of skin, unspecified     Rosacea     Sleep apnea        Past Surgical History:   Procedure Laterality Date    COLONOSCOPY N/A 10/4/2022    Procedure: COLONOSCOPY;  Surgeon: Kyle Choi MD;  Location: [facility] ENDO;  Service: Endoscopy;  Laterality: N/A;    COLONOSCOPY N/A 11/1/2022    Procedure: COLONOSCOPY;  Surgeon: Kyle Choi MD;  Location: [facility] ENDO;  Service: Endoscopy;  Laterality: N/A;    ESOPHAGOGASTRODUODENOSCOPY N/A 9/20/2022    Procedure: EGD (ESOPHAGOGASTRODUODENOSCOPY);  Surgeon: Kyle Choi MD;  Location: [facility] ENDO;  Service: Endoscopy;  Laterality: N/A;    EXCISION OF MELANOMA N/A 03/19/2021    Procedure: EXCISION, MELANOMA;  Surgeon: Tito Irving MD;  Location: [facility];  Service: General;  Laterality: N/A;  excision of melanoma mid back    KNEE ARTHROSCOPY Left     SHOULDER  ARTHROSCOPY Right     ULNAR TUNNEL RELEASE Left        Time Tracking:     PT Received On: 03/01/23  PT Start Time: 1346     PT Stop Time: 1405  PT Total Time (min): 19 min     Billable Minutes: Evaluation 9 and Gait Training 10      03/01/2023

## 2023-03-01 NOTE — DISCHARGE INSTRUCTIONS
"Discharge Instructions: After Your Surgery/Procedure  Youve just had surgery. During surgery you were given medicine called anesthesia to keep you relaxed and free of pain. After surgery you may have some pain or nausea. This is common. Here are some tips for feeling better and getting well after surgery.     Stay on schedule with your medication.   Going home  Your doctor or nurse will show you how to take care of yourself when you go home. He or she will also answer your questions. Have an adult family member or friend drive you home.      For your safety we recommend these precaution for the first 24 hours after your procedure:  Do not drive or use heavy equipment.  Do not make important decisions or sign legal papers.  Do not drink alcohol.  Have someone stay with you, if needed. He or she can watch for problems and help keep you safe.  Your concentration, balance, coordination, and judgement may be impaired for many hours after anesthesia.  Use caution when ambulating or standing up.     You may feel weak and "washed out" after anesthesia and surgery.      Subtle residual effects of general anesthesia or sedation with regional / local anesthesia can last more than 24 hours.  Rest for the remainder of the day or longer if your Doctor/Surgeon has advised you to do so.  Although you may feel normal within the first 24 hours, your reflexes and mental ability may be impaired without you realizing it.  You may feel dizzy, lightheaded or sleepy for 24 hours or longer.      Be sure to go to all follow-up visits with your doctor. And rest after your surgery for as long as your doctor tells you to.  Coping with pain  If you have pain after surgery, pain medicine will help you feel better. Take it as told, before pain becomes severe. Also, ask your doctor or pharmacist about other ways to control pain. This might be with heat, ice, or relaxation. And follow any other instructions your surgeon or nurse gives you.  Tips " for taking pain medicine  To get the best relief possible, remember these points:  Pain medicines can upset your stomach. Taking them with a little food may help.  Most pain relievers taken by mouth need at least 20 to 30 minutes to start to work.  Taking medicine on a schedule can help you remember to take it. Try to time your medicine so that you can take it before starting an activity. This might be before you get dressed, go for a walk, or sit down for dinner.  Constipation is a common side effect of pain medicines. Call your doctor before taking any medicines such as laxatives or stool softeners to help ease constipation. Also ask if you should skip any foods. Drinking lots of fluids and eating foods such as fruits and vegetables that are high in fiber can also help. Remember, do not take laxatives unless your surgeon has prescribed them.  Drinking alcohol and taking pain medicine can cause dizziness and slow your breathing. It can even be deadly. Do not drink alcohol while taking pain medicine.  Pain medicine can make you react more slowly to things. Do not drive or run machinery while taking pain medicine.  Your health care provider may tell you to take acetaminophen to help ease your pain. Ask him or her how much you are supposed to take each day. Acetaminophen or other pain relievers may interact with your prescription medicines or other over-the-counter (OTC) drugs. Some prescription medicines have acetaminophen and other ingredients. Using both prescription and OTC acetaminophen for pain can cause you to overdose. Read the labels on your OTC medicines with care. This will help you to clearly know the list of ingredients, how much to take, and any warnings. It may also help you not take too much acetaminophen. If you have questions or do not understand the information, ask your pharmacist or health care provider to explain it to you before you take the OTC medicine.  Managing nausea  Some people have an  upset stomach after surgery. This is often because of anesthesia, pain, or pain medicine, or the stress of surgery. These tips will help you handle nausea and eat healthy foods as you get better. If you were on a special food plan before surgery, ask your doctor if you should follow it while you get better. These tips may help:  Do not push yourself to eat. Your body will tell you when to eat and how much.  Start off with clear liquids and soup. They are easier to digest.  Next try semi-solid foods, such as mashed potatoes, applesauce, and gelatin, as you feel ready.  Slowly move to solid foods. Dont eat fatty, rich, or spicy foods at first.  Do not force yourself to have 3 large meals a day. Instead eat smaller amounts more often.  Take pain medicines with a small amount of solid food, such as crackers or toast, to avoid nausea.     Call your surgeon if  You still have pain an hour after taking medicine. The medicine may not be strong enough.  You feel too sleepy, dizzy, or groggy. The medicine may be too strong.  You have side effects like nausea, vomiting, or skin changes, such as rash, itching, or hives.       If you have obstructive sleep apnea  You were given anesthesia medicine during surgery to keep you comfortable and free of pain. After surgery, you may have more apnea spells because of this medicine and other medicines you were given. The spells may last longer than usual.   At home:  Keep using the continuous positive airway pressure (CPAP) device when you sleep. Unless your health care provider tells you not to, use it when you sleep, day or night. CPAP is a common device used to treat obstructive sleep apnea.  Talk with your provider before taking any pain medicine, muscle relaxants, or sedatives. Your provider will tell you about the possible dangers of taking these medicines.  © 7896-9575 [employer]. [address] [place] [address], [place] [address]. All rights reserved. This information is  not intended as a substitute for professional medical care. Always follow your healthcare professional's instructions.       Using an Incentive Spirometer    An incentive spirometer is a device that helps you do deep breathing exercises. These exercises expand your lungs, aid in circulation, and help prevent pneumonia. Deep breathing exercises also help you breathe better and improve the function of your lungs by:  Keeping your lungs clear  Strengthening your breathing muscles  Helping prevent respiratory complications or problems  The incentive spirometer gives you a way to take an active part in recover. A nurse or therapist will teach you breathing exercises. To do these exercises, you will breathe in through your mouth and not your nose. The incentive spirometer only works correctly if you breathe in through your mouth.  Steps to clear lungs  Step 1. Exhale normally. Then, inhale normally.  Relax and breathe out.  Step 2. Place your lips tightly around the mouthpiece.  Make sure the device is upright and not tilted.  Step 3. Inhale as much air as you can through the mouthpiece (don't breath through your nose).  Inhale slowly and deeply.  Hold your breath long enough to keep the balls or disk raised for at least 3 to 5 seconds, or as instructed by your healthcare provider.  Some spirometers have an indicator to let you know that you are breathing in too fast. If the indicator goes off, breathe in more slowly.  Step 4. Repeat the exercise regularly.  Do this exercise every hour while you're awake, or as instructed by your healthcare provider.  If you were taught deep breathing and coughing exercises, do them regularly as instructed by your healthcare provider.       Post op instructions for prevention of DVT  What is deep vein thrombosis?  Deep vein thrombosis (DVT) is the medical term for blood clots in the deep veins of the leg.  These blood clots can be dangerous.  A DVT can block a blood vessel and keep blood  from getting where it needs to go.  Another problem is that the clot can travel to other parts of the body such as the lungs.  A clot that travels to the lungs is called a pulmonary embolus (PE) and can cause serious problems with breathing which can lead to death.  Am I at risk for DVT/PE?  If you are not very active, you are at risk of DVT.  Anyone confined to bed, sitting for long periods of time, recovering from surgery, etc. increases the risk of DVT.  Other risk factors are cancer diagnosis, certain medications, estrogen replacement in any form,older age, obesity, pregnancy, smoking, history of clotting disorders, and dehydration.  How will I know if I have a DVT?  Swelling in the lower leg  Pain  Warmth, redness, hardness or bulging of the vein  If you have any of these symptoms, call your doctors office right away.  Some people will not have any symptoms until the clot moves to the lungs.  What are the symptoms of a PE?  Panting, shortness of breath, or trouble breathing  Sharp, knife-like chest pain when you breathe  Coughing or coughing up blood  Rapid heartbeat  If you have any of these symptoms or get worse quickly, call 911 for emergency treatment.  How can I prevent a DVT?  Avoid long periods of inactivity and dont cross your legs--get up and walk around every hour or so.  Stay active--walking after surgery is highly encouraged.  This means you should get out of the house and walk in the neighborhood.  Going up and down stairs will not impair healing (unless advised against such activity by your doctor).    Drink plenty of noncaffeinated, nonalcoholic fluids each day to prevent dehydration.  Wear special support stockings, if they have been advised by your doctor.  If you travel, stop at least once an hour and walk around.  Avoid smoking (assistance with stopping is available through your healthcare provider)  Always notify your doctor if you are not able to follow the post operative instructions  that are given to you at the time of discharge.  It may be necessary to prescribe one of the medications available to prevent DVT.       We hope your stay was comfortable as you heal now, mend and rest.    For we have enjoyed taking care of you by giving your our best.    And as you get better, by regaining your health and strength;   We count it as a privilege to have served you and hope your time at [facility] was well spent.      Thank  You!!!

## 2023-03-01 NOTE — TRANSFER OF CARE
"Anesthesia Transfer of Care Note    Patient: Tutu Herndon III    Procedure(s) Performed: Procedure(s) (LRB):  ARTHROPLASTY, HIP (Right)    Patient location: PACU    Anesthesia Type: MAC, regional and spinal    Transport from OR: Transported from OR on room air with adequate spontaneous ventilation    Post pain: adequate analgesia    Post assessment: no apparent anesthetic complications and tolerated procedure well    Post vital signs: stable    Level of consciousness: awake    Nausea/Vomiting: no nausea/vomiting    Transfer of care protocol was followed      Last vitals:   Visit Vitals  BP (!) 103/56 (BP Location: Left arm, Patient Position: Lying)   Pulse 89   Temp 36.5 °C (97.7 °F) (Temporal)   Resp 18   Ht 5' 10" (1.778 m)   Wt 124.7 kg (275 lb)   SpO2 97%   BMI 39.46 kg/m²     "

## 2023-03-01 NOTE — ANESTHESIA PROCEDURE NOTES
KARLA GARCIA    Patient location during procedure: pre-op   Block not for primary anesthetic.  Reason for block: at surgeon's request and post-op pain management   Post-op Pain Location: right hip   Start time: 3/1/2023 7:57 AM  Timeout: 3/1/2023 7:57 AM   End time: 3/1/2023 8:01 AM    Staffing  Authorizing Provider: Phillip Mendiola MD  Performing Provider: Phillip Mendiola MD    Preanesthetic Checklist  Completed: patient identified, IV checked, site marked, risks and benefits discussed, surgical consent, monitors and equipment checked, pre-op evaluation and timeout performed  Peripheral Block  Patient position: supine  Prep: ChloraPrep  Patient monitoring: heart rate, continuous pulse ox and cardiac monitor  Block type: PENG  Laterality: right  Injection technique: single shot  Needle  Needle type: Stimuplex   Needle gauge: 21 G  Needle length: 4 in  Needle localization: ultrasound guidance   -ultrasound image captured on disc.  Assessment  Injection assessment: negative aspiration and negative parasthesia  Paresthesia pain: none  Heart rate change: no  Slow fractionated injection: yes  Pain Tolerance: comfortable throughout block and no complaints  Medications:    Medications: bupivacaine (pf) (MARCAINE) injection 0.5% - Perineural   30 mL - 3/1/2023 8:01:00 AM    Additional Notes  VSS. See RN record  No complications

## 2023-03-01 NOTE — PLAN OF CARE
Patient ready for surgery. Surgery and anesthesia consents signed. Educated on incentive spirometer use. Belongings with spouse. Spouse set up with text message notifications.

## 2023-03-01 NOTE — DISCHARGE SUMMARY
[facility]  Department of Orthopedic Surgery  Discharge Note  Short Stay    Admit Date: 3/1/2023    Discharge Date and Time: No discharge date for patient encounter.     Attending Physician: Thompson Perez II, MD     Discharge Provider: Thompson Perez II    Diagnoses:  Active Hospital Problems    Diagnosis  POA    *Primary osteoarthritis of both hips [M16.0]  Yes      Resolved Hospital Problems   No resolved problems to display.       Discharged Condition: good    Hospital Course: Patient was admitted for an outpatient procedure and tolerated the procedure well with no complications.    Final Diagnoses: Same as principal problem.    Disposition: Home or Self Care    Follow up/Patient Instructions:    Medications:  Reconciled Home Medications:      Medication List        CONTINUE taking these medications      acetaminophen 650 MG Tbsr  Commonly known as: TYLENOL  Take 650 mg by mouth every 8 (eight) hours.     aspirin 81 MG EC tablet  Commonly known as: ECOTRIN  Take 1 tablet (81 mg total) by mouth 2 (two) times daily.     calcium carbonate 200 mg calcium (500 mg) chewable tablet  Commonly known as: TUMS  Take 1 tablet by mouth once daily.     doxycycline 40 mg capsule  Commonly known as: ORACEA  Take 40 mg by mouth once daily.     GAVISCON ORAL  Take by mouth every evening.     HYDROcodone-acetaminophen 7.5-325 mg per tablet  Commonly known as: NORCO  Take 1 tablet by mouth every 6 (six) hours as needed for Pain.     ibuprofen 800 MG tablet  Commonly known as: ADVIL,MOTRIN  Take 800 mg by mouth 3 (three) times daily.     meloxicam 15 MG tablet  Commonly known as: MOBIC  Take 1 tablet (15 mg total) by mouth once daily.     metronidazole 1% 1 % Gel  Commonly known as: METROGEL  Apply topically once daily.     multivitamin per tablet  Commonly known as: THERAGRAN  Take 1 tablet by mouth.     naproxen sodium 220 MG tablet  Commonly known as: ANAPROX  Take 220 mg by mouth.     pantoprazole 40 MG  tablet  Commonly known as: PROTONIX  TAKE 1 TABLET(40 MG) BY MOUTH TWICE DAILY     prazosin 2 MG Cap  Commonly known as: MINIPRESS  Take 1 mg by mouth once daily.     sertraline 25 MG tablet  Commonly known as: ZOLOFT  Take 25 mg by mouth once daily.     SODIUM SULFATE MISC  by Misc.(Non-Drug; Combo Route) route. topical     telmisartan 40 MG Tab  Commonly known as: MICARDIS  Take 1 tablet (40 mg total) by mouth once daily.     traZODone 50 MG tablet  Commonly known as: DESYREL  Take 50 mg by mouth every evening.     triamcinolone 0.5 % ointment  Commonly known as: KENALOG  Apply topically 2 (two) times daily.            Discharge Procedure Orders   Diet Adult Regular     Ice to affected area     Notify your health care provider if you experience any of the following:  increased confusion or weakness     Notify your health care provider if you experience any of the following:  persistent dizziness, light-headedness, or visual disturbances     Notify your health care provider if you experience any of the following:  worsening rash     Notify your health care provider if you experience any of the following:  severe persistent headache     Notify your health care provider if you experience any of the following:  difficulty breathing or increased cough     Notify your health care provider if you experience any of the following:  redness, tenderness, or signs of infection (pain, swelling, redness, odor or green/yellow discharge around incision site)     Notify your health care provider if you experience any of the following:  severe uncontrolled pain     Notify your health care provider if you experience any of the following:  persistent nausea and vomiting or diarrhea     Notify your health care provider if you experience any of the following:  temperature >100.4     Leave dressing on - Keep it clean, dry, and intact until clinic visit     Activity as tolerated     Weight bearing restrictions (specify):   Order  Comments: WBAT RLE      Follow-up Information       [facility]. Call in 1 day(s).    Specialty: Home Health Services  Contact information:  [address] [place] [id number] [phone]                             Discharge Procedure Orders (must include Diet, Follow-up, Activity):   Discharge Procedure Orders (must include Diet, Follow-up, Activity)   Diet Adult Regular     Ice to affected area     Notify your health care provider if you experience any of the following:  increased confusion or weakness     Notify your health care provider if you experience any of the following:  persistent dizziness, light-headedness, or visual disturbances     Notify your health care provider if you experience any of the following:  worsening rash     Notify your health care provider if you experience any of the following:  severe persistent headache     Notify your health care provider if you experience any of the following:  difficulty breathing or increased cough     Notify your health care provider if you experience any of the following:  redness, tenderness, or signs of infection (pain, swelling, redness, odor or green/yellow discharge around incision site)     Notify your health care provider if you experience any of the following:  severe uncontrolled pain     Notify your health care provider if you experience any of the following:  persistent nausea and vomiting or diarrhea     Notify your health care provider if you experience any of the following:  temperature >100.4     Leave dressing on - Keep it clean, dry, and intact until clinic visit     Activity as tolerated     Weight bearing restrictions (specify):   Order Comments: WBAT RLE

## 2023-03-01 NOTE — PT/OT/SLP EVAL
Occupational Therapy   Evaluation and Treatment    Name: Tutu Herndon III  MRN: [id number]  Admitting Diagnosis: Primary osteoarthritis of both hips  Recent Surgery: Procedure(s) (LRB):  ARTHROPLASTY, HIP (Right) Day of Surgery    Recommendations:     Discharge Recommendations: home with home health  Discharge Equipment Recommendations:  other (see comments) (Already owns hip kit items, RW, and BSC; pt reporting BSC too narrow and also needs TTB. NurseRhiannon and NANCY, April notified.)  Barriers to discharge:  None    Assessment:     Tutu Herndon III is a 52 y.o. male with a medical diagnosis of Primary osteoarthritis of both hips. Patient agreeable to participate in OT evaluation/treatment session.      Supine<>sit with SBA, sit<>stand with CGA using RW, ambulating with CGA using RW approximately 50 ft to/from patient room/bathroom with no loss of balance, toilet transfer with CGA, toileting tasks with SBA, UBD with Supervision, and LBD with SBA with use of hip kit items/RW. He presents with the following performance deficits affecting function: impaired self care skills, impaired functional mobility, gait instability, decreased lower extremity function, decreased ROM, orthopedic precautions.      OTR reinforcing posterior hip precautions, correct transfer sequence/techniques in adherence with hip precautions, adaptive dressing techniques with use of adaptive equipment, correct walker management, gait/walker sequence, importance of frequent mobilization (every 30 minutes during the day) in home environment, importance of correct sit surface height in adherence with posterior hip precautions - patient and spouse verbalize/demonstrate understanding and in agreement when appropriate. Patient reports BSC delivered to home is too narrow and OTR recommending use of tub/shower combo (as patient reports his plan was to sit on edge of jetted tub to bathe); recommend transfer tub bench. Patient/spouse verbalize  understanding and in agreement. OTR notifying Rhiannon lind and NANCY April of DME changes/addition.     Rehab Prognosis: Good; patient would benefit from acute skilled OT services to address these deficits and reach maximum level of function.       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  Patient found  head of stretcher elevated  with  (head of stretcher elevated; spouse bedside; R hip incision dressing intact) upon OT entry to room.    General Precautions: Standard, fall  Orthopedic Precautions: RLE posterior precautions, RLE weight bearing as tolerated  Braces: N/A  Respiratory Status: Room air    Occupational Performance:    Bed Mobility:    Patient completed Supine to Sit with stand by assistance  Patient completed Sit to Supine with stand by assistance    Functional Mobility/Transfers:  Patient completed Sit <> Stand Transfer with contact guard assistance  with  rolling walker and verbal instruction for step by step sequence of transfer technique in adherence with posterior hip precautions   Patient completed Toilet Transfer Stand Pivot technique with contact guard assistance with  rolling walker and step  by step verbal instruction for sequence of transfer using RW  Functional Mobility: CGA with RW to ambulate approximately 50 ft with no loss of balance; after initial verbal instruction for gait/walker sequence, patient demonstrates correct sequence and walker management    Activities of Daily Living:  Grooming: Close SBA standing sink side with use of RW    Upper Body Dressing: Set-up of t-shirt otherwise Supervision to don/doff clothing from EOB    Lower Body Dressing: OTR providing education/instruction with demonstration as needed for correct use of reacher to doff socks and don LB clothing (shorts) as well as adaptive dressing techniques (threading R LE through clothing first, etc) close SBA while patient in standing to manage clothing over hips. Patient reports he has shoes he will wear at home and does not plan on using sock aid  Toileting: CGA to close SBA to perform toileting tasks from toilet level (voiding)    Cognitive/Visual Perceptual:  Cognitive/Psychosocial Skills:     -       Oriented to: x 4   -       Safety awareness/insight to disability: intact     Physical Exam:  Upper Extremity Strength:    -       Right Upper Extremity: WNL  -       Left Upper Extremity: WNL    AMPAC 6 Click ADL:  AMPAC Total Score: 18    Treatment & Education:  - OTR providing education/instruction regarding OT role/POC, safety awareness/fall prevention, R LE posterior hip precautions, correct transfer sequence/techniques with use of RW in adherence with posterior hip precautions, correct walker management, gait/walker sequence including avoidance of turning towards R when possible to prevent R hip internal rotation, adaptive dressing techniques and step by step instruction for use of hip kit items to increase safety and independence with ADL in adherence with posterior hip precautions, infection prevention, importance of frequent Supervised mobilization in home environment (every 30 minutes) as part of DVT prevention, and  providing DME recommendations. Patient reports he plans to bathe from edge of jetted tub - OTR recommending patient use tub/shower in 2nd bathroom for safety and recommending transfer tub bench - patient/spouse verbalize understanding and in agreement. Patient also reports that the delivered BSC is too narrow. OTR notifying nurseRhiannon and CM, April of new DME recommendations: bariatric BSC and TTB - nurse/CM acknowledging.    Patient left  head of stretcher elevated  with all lines intact, call button in reach, NurseRhiannon notified, and Spouse present    GOALS:   Multidisciplinary Problems       Occupational Therapy Goals          Problem: Occupational Therapy    Goal Priority Disciplines Outcome Interventions   Occupational Therapy Goal     OT, PT/OT     Description: Goals to be met by: 3/8/2023     Patient will increase functional independence with ADLs by performing:    LE Dressing with Modified [place] with AD/AE as needed in adherence with R LE posterior hip precautions.  Grooming while standing with Modified [place].  Toileting from toilet with Modified [place] for hygiene and clothing management.   Toilet transfer to toilet with Modified [place] with AD/AE as needed in adherence with R LE posterior hip precautions.                         History:     Past Medical History:   Diagnosis Date    Arthritis     Depression     Eczema     GERD (gastroesophageal reflux disease)     Hypertension     Insomnia     Malignant melanoma of skin, unspecified     Rosacea     Sleep apnea          Past Surgical History:   Procedure Laterality Date    COLONOSCOPY N/A 10/4/2022    Procedure: COLONOSCOPY;  Surgeon: Kyle Choi MD;  Location: [facility];  Service: Endoscopy;  Laterality: N/A;    COLONOSCOPY N/A 11/1/2022    Procedure: COLONOSCOPY;  Surgeon: Kyle Choi MD;  Location: [facility];  Service: Endoscopy;  Laterality: N/A;    ESOPHAGOGASTRODUODENOSCOPY N/A 9/20/2022     Procedure: EGD (ESOPHAGOGASTRODUODENOSCOPY);  Surgeon: Kyle Choi MD;  Location: [facility];  Service: Endoscopy;  Laterality: N/A;    EXCISION OF MELANOMA N/A 03/19/2021    Procedure: EXCISION, MELANOMA;  Surgeon: Tito Irving MD;  Location: [facility] OR;  Service: General;  Laterality: N/A;  excision of melanoma mid back    KNEE ARTHROSCOPY Left     SHOULDER ARTHROSCOPY Right     ULNAR TUNNEL RELEASE Left        Time Tracking:     OT Date of Treatment: 03/01/23  OT Start Time: 1308  OT Stop Time: 1345  OT Total Time (min): 37 min    Billable Minutes:Evaluation 10  Self Care/Home Management 15  Therapeutic Activity 12    3/1/2023

## 2023-03-01 NOTE — ANESTHESIA POSTPROCEDURE EVALUATION
Anesthesia Post Evaluation    Patient: Tutu Herndon III    Procedure(s) Performed: Procedure(s) (LRB):  ARTHROPLASTY, HIP (Right)    Final Anesthesia Type: spinal      Patient location during evaluation: PACU  Patient participation: Yes- Able to Participate  Level of consciousness: awake and alert and oriented  Post-procedure vital signs: reviewed and stable  Pain management: adequate  Airway patency: patent  JIMBO mitigation strategies: Multimodal analgesia and Use of major conduction anesthesia (spinal/epidural) or peripheral nerve block  PONV status at discharge: No PONV  Anesthetic complications: no      Cardiovascular status: blood pressure returned to baseline  Respiratory status: unassisted, spontaneous ventilation and room air  Hydration status: euvolemic  Follow-up not needed.          Vitals Value Taken Time   /57 03/01/23 1135   Temp 36.5 °C (97.7 °F) 03/01/23 1055   Pulse 94 03/01/23 1137   Resp 12 03/01/23 1137   SpO2 98 % 03/01/23 1137   Vitals shown include unvalidated device data.      No case tracking events are documented in the log.      Pain/Iglesia Score: Pain Rating Prior to Med Admin: 6 (3/1/2023  7:19 AM)  Iglesia Score: 10 (3/1/2023 11:30 AM)

## 2023-03-01 NOTE — PLAN OF CARE
Goals to be met by: 3/8/2023     Patient will increase functional independence with ADLs by performing:    LE Dressing with Modified [place] with AD/AE as needed in adherence with R LE posterior hip precautions.  Grooming while standing with Modified [place].  Toileting from toilet with Modified [place] for hygiene and clothing management.   Toilet transfer to toilet with Modified [place] with AD/AE as needed in adherence with R LE posterior hip precautions.    OT POC initiated and established.

## 2023-03-01 NOTE — PLAN OF CARE
Notified Dr Perez regarding pt's discharge meds. Pt to be discharged on ASA 81mg BID, but only 14 tablets are prescribed. Per Dr. Perez, pt to be on ASA 81mg BID for one month, and to message his LPN for an additional prescription. Updated pt and pt's spouse. Per pt and pt's spouse, they have enough ASA 81mg at home for pt to take as prescribed by Dr. Perez for one month, and do not want another prescription.

## 2023-03-01 NOTE — PLAN OF CARE
Pt reports he would like a larger BSC and a tub transfer bench. OT notified .  to update us. Updated pt and pt's spouse. Pt and pt's spouse verbalized understanding.

## 2023-03-02 PROCEDURE — G0180 MD CERTIFICATION HHA PATIENT: HCPCS | Mod: ,,, | Performed by: ORTHOPAEDIC SURGERY

## 2023-03-02 PROCEDURE — G0180 PR HOME HEALTH MD CERTIFICATION: ICD-10-PCS | Mod: ,,, | Performed by: ORTHOPAEDIC SURGERY

## 2023-03-03 VITALS
OXYGEN SATURATION: 97 % | DIASTOLIC BLOOD PRESSURE: 72 MMHG | HEART RATE: 95 BPM | TEMPERATURE: 98 F | HEIGHT: 70 IN | BODY MASS INDEX: 39.37 KG/M2 | WEIGHT: 275 LBS | RESPIRATION RATE: 16 BRPM | SYSTOLIC BLOOD PRESSURE: 112 MMHG

## 2023-03-03 NOTE — PROGRESS NOTES
Very pleased with care given.  All nurses and staff were excellent.  States he understands all discharge instructions.  PT working with patient and HH.

## 2023-03-09 DIAGNOSIS — Z96.641 S/P HIP REPLACEMENT, RIGHT: Primary | ICD-10-CM

## 2023-03-14 ENCOUNTER — HOSPITAL ENCOUNTER (OUTPATIENT)
Dept: RADIOLOGY | Facility: HOSPITAL | Age: 53
Discharge: HOME | End: 2023-03-14
Attending: ORTHOPAEDIC SURGERY
Payer: OTHER GOVERNMENT

## 2023-03-14 ENCOUNTER — PATIENT MESSAGE (OUTPATIENT)
Dept: ORTHOPEDICS | Facility: CLINIC | Age: 53
End: 2023-03-14

## 2023-03-14 ENCOUNTER — OFFICE VISIT (OUTPATIENT)
Dept: ORTHOPEDICS | Facility: CLINIC | Age: 53
End: 2023-03-14
Payer: OTHER GOVERNMENT

## 2023-03-14 VITALS — HEIGHT: 70 IN | RESPIRATION RATE: 18 BRPM | WEIGHT: 275 LBS | BODY MASS INDEX: 39.37 KG/M2

## 2023-03-14 DIAGNOSIS — Z96.641 S/P HIP REPLACEMENT, RIGHT: Primary | ICD-10-CM

## 2023-03-14 DIAGNOSIS — Z96.641 S/P HIP REPLACEMENT, RIGHT: ICD-10-CM

## 2023-03-14 PROCEDURE — 73502 X-RAY EXAM HIP UNI 2-3 VIEWS: CPT | Mod: 26,RT,, | Performed by: RADIOLOGY

## 2023-03-14 PROCEDURE — 99213 OFFICE O/P EST LOW 20 MIN: CPT | Mod: PBBFAC,PN | Performed by: ORTHOPAEDIC SURGERY

## 2023-03-14 PROCEDURE — 99024 PR POST-OP FOLLOW-UP VISIT: ICD-10-PCS | Mod: ,,, | Performed by: ORTHOPAEDIC SURGERY

## 2023-03-14 PROCEDURE — 99999 PR PBB SHADOW E&M-EST. PATIENT-LVL III: CPT | Mod: PBBFAC,,, | Performed by: ORTHOPAEDIC SURGERY

## 2023-03-14 PROCEDURE — 73502 X-RAY EXAM HIP UNI 2-3 VIEWS: CPT | Mod: TC,PN,RT

## 2023-03-14 PROCEDURE — 99024 POSTOP FOLLOW-UP VISIT: CPT | Mod: ,,, | Performed by: ORTHOPAEDIC SURGERY

## 2023-03-14 PROCEDURE — 99999 PR PBB SHADOW E&M-EST. PATIENT-LVL III: ICD-10-PCS | Mod: PBBFAC,,, | Performed by: ORTHOPAEDIC SURGERY

## 2023-03-14 PROCEDURE — 73502 XR HIP WITH PELVIS WHEN PERFORMED, 2 OR 3  VIEWS RIGHT: ICD-10-PCS | Mod: 26,RT,, | Performed by: RADIOLOGY

## 2023-03-14 NOTE — PROGRESS NOTES
CC:  52-year-old male follows up status post right total hip arthroplasty.  Date of surgery is 03/01/2023.  He is 13 days out.  He is currently basically pain-free.  He states he only took his pain medication for the 1st 2 days postoperatively.  He stopped using the walker 3 days postoperatively.  He started driving about a week ago.  He has no complaints today.    Examination of the Right Lower Extremity    Skin is intact throughout.  Incision is clean, dry, and intact.  Healing well with no sign of infection.  No erythema and no drainage.  Motor in intact EHL,FHL,TA,lesley  +2 DP/PT  Sensation LT intact D/P/1st    Examination of the Right Hip    C-Sign negative  Logroll negative  Stenchfield negative    Pain with ROM negative    ROM:    Flexion   120  Extension   30  Abduction   45  Adduction   20  External Rotation 45  Internal Rotation 35    Flexion contracture negative    FADIR negative  FADER negative    Tenderness to palpation over lateral and posterolateral greater tochanter negative    X-ray images were examined and personally interpreted by me.  Three views the right hip dated 03/14/2023 show a right total hip arthroplasty that is well fixed and in good alignment.  There is osteoarthritis of the left hip noted on the AP pelvis with complete loss of joint space and bone-on-bone contact on the left.  Measuring the inner ischial line the right is a few mm longer than the left.    Dx:  Status post right total hip arthroplasty, stable    Plan:  Progress activity as tolerated.  We did talk about the leg length issue and I think the patient will eventually need his left hip done.  We discussed that the left hip is shorter than it should be because of the collapse and loss of space due to the arthritis and when he has his left hip done it should even both his hips out.  We discussed using an insert in his left shoe to make him feel balanced for now.  Follow up in 4 weeks for re-evaluation.

## 2023-03-16 ENCOUNTER — PATIENT MESSAGE (OUTPATIENT)
Dept: ORTHOPEDICS | Facility: CLINIC | Age: 53
End: 2023-03-16
Payer: OTHER GOVERNMENT

## 2023-03-17 ENCOUNTER — PATIENT MESSAGE (OUTPATIENT)
Dept: RESEARCH | Facility: HOSPITAL | Age: 53
End: 2023-03-17
Payer: OTHER GOVERNMENT

## 2023-04-10 DIAGNOSIS — Z96.641 S/P HIP REPLACEMENT, RIGHT: Primary | ICD-10-CM

## 2023-04-11 ENCOUNTER — EXTERNAL HOME HEALTH (OUTPATIENT)
Dept: HOME HEALTH SERVICES | Facility: HOSPITAL | Age: 53
End: 2023-04-11
Payer: OTHER GOVERNMENT

## 2023-04-11 ENCOUNTER — OFFICE VISIT (OUTPATIENT)
Dept: ORTHOPEDICS | Facility: CLINIC | Age: 53
End: 2023-04-11
Payer: OTHER GOVERNMENT

## 2023-04-11 ENCOUNTER — HOSPITAL ENCOUNTER (OUTPATIENT)
Dept: RADIOLOGY | Facility: HOSPITAL | Age: 53
Discharge: HOME | End: 2023-04-11
Attending: ORTHOPAEDIC SURGERY
Payer: OTHER GOVERNMENT

## 2023-04-11 VITALS — BODY MASS INDEX: 39.37 KG/M2 | RESPIRATION RATE: 18 BRPM | WEIGHT: 275 LBS | HEIGHT: 70 IN

## 2023-04-11 DIAGNOSIS — Z96.641 S/P HIP REPLACEMENT, RIGHT: Primary | ICD-10-CM

## 2023-04-11 DIAGNOSIS — Z96.641 S/P HIP REPLACEMENT, RIGHT: ICD-10-CM

## 2023-04-11 PROCEDURE — 99999 PR PBB SHADOW E&M-EST. PATIENT-LVL III: CPT | Mod: PBBFAC,,, | Performed by: ORTHOPAEDIC SURGERY

## 2023-04-11 PROCEDURE — 73502 XR HIP WITH PELVIS WHEN PERFORMED, 2 OR 3  VIEWS RIGHT: ICD-10-PCS | Mod: 26,RT,, | Performed by: RADIOLOGY

## 2023-04-11 PROCEDURE — 99024 POSTOP FOLLOW-UP VISIT: CPT | Mod: ,,, | Performed by: ORTHOPAEDIC SURGERY

## 2023-04-11 PROCEDURE — 99213 OFFICE O/P EST LOW 20 MIN: CPT | Mod: PBBFAC,PN | Performed by: ORTHOPAEDIC SURGERY

## 2023-04-11 PROCEDURE — 99999 PR PBB SHADOW E&M-EST. PATIENT-LVL III: ICD-10-PCS | Mod: PBBFAC,,, | Performed by: ORTHOPAEDIC SURGERY

## 2023-04-11 PROCEDURE — 73502 X-RAY EXAM HIP UNI 2-3 VIEWS: CPT | Mod: TC,PN,RT

## 2023-04-11 PROCEDURE — 99024 PR POST-OP FOLLOW-UP VISIT: ICD-10-PCS | Mod: ,,, | Performed by: ORTHOPAEDIC SURGERY

## 2023-04-11 PROCEDURE — 73502 X-RAY EXAM HIP UNI 2-3 VIEWS: CPT | Mod: 26,RT,, | Performed by: RADIOLOGY

## 2023-04-11 NOTE — PROGRESS NOTES
CC:  52-year-old male follows up status post right total hip arthroplasty.  Date of surgery was 03/01/2023.  He is about 6 weeks out.  Overall is doing well.  He is currently pain free and has no complaints.    Examination of the Right Lower Extremity    Skin is intact throughout, incision well healed with no sign of infection  Motor in intact EHL,FHL,TA,lesley  +2 DP/PT  Sensation LT intact D/P/1st    Examination of the Right Hip    C-Sign negative  Logroll negative  Stenchfield negative    Pain with ROM negative    ROM:    Flexion   120  Extension   30  Abduction   45  Adduction   20  External Rotation 45  Internal Rotation 35    Flexion contracture negative    FADIR negative  FADER negative    Tenderness to palpation over lateral and posterolateral greater tochanter negative    X-ray images were examined and personally interpreted by me.  Three views the right hip dated 04/11/2023 show a right total hip arthroplasty that is well fixed and in good alignment.    Dx:  Status post right total hip arthroplasty, stable    Plan:  Activity as tolerated.  Follow up in 6 weeks with an x-ray.

## 2023-04-15 ENCOUNTER — DOCUMENT SCAN (OUTPATIENT)
Dept: HOME HEALTH SERVICES | Facility: HOSPITAL | Age: 53
End: 2023-04-15
Payer: OTHER GOVERNMENT

## 2023-05-21 DIAGNOSIS — K21.9 GASTROESOPHAGEAL REFLUX DISEASE, UNSPECIFIED WHETHER ESOPHAGITIS PRESENT: Primary | ICD-10-CM

## 2023-05-21 DIAGNOSIS — M16.0 PRIMARY OSTEOARTHRITIS OF BOTH HIPS: ICD-10-CM

## 2023-05-22 DIAGNOSIS — Z96.641 S/P HIP REPLACEMENT, RIGHT: Primary | ICD-10-CM

## 2023-05-22 RX ORDER — MELOXICAM 15 MG/1
TABLET ORAL
Qty: 30 TABLET | Refills: 11 | Status: SHIPPED | OUTPATIENT
Start: 2023-05-22

## 2023-05-28 RX ORDER — PANTOPRAZOLE SODIUM 40 MG/1
40 TABLET, DELAYED RELEASE ORAL DAILY
Qty: 30 TABLET | Refills: 2 | Status: SHIPPED | OUTPATIENT
Start: 2023-05-28 | End: 2023-10-11

## 2023-09-22 DIAGNOSIS — Z96.641 S/P HIP REPLACEMENT, RIGHT: Primary | ICD-10-CM

## 2023-09-26 ENCOUNTER — PATIENT MESSAGE (OUTPATIENT)
Dept: ORTHOPEDICS | Facility: CLINIC | Age: 53
End: 2023-09-26

## 2023-09-26 ENCOUNTER — OFFICE VISIT (OUTPATIENT)
Dept: ORTHOPEDICS | Facility: CLINIC | Age: 53
End: 2023-09-26
Payer: OTHER GOVERNMENT

## 2023-09-26 ENCOUNTER — HOSPITAL ENCOUNTER (OUTPATIENT)
Dept: RADIOLOGY | Facility: HOSPITAL | Age: 53
Discharge: HOME | End: 2023-09-26
Attending: ORTHOPAEDIC SURGERY
Payer: OTHER GOVERNMENT

## 2023-09-26 VITALS — WEIGHT: 274.94 LBS | BODY MASS INDEX: 39.36 KG/M2 | HEIGHT: 70 IN

## 2023-09-26 DIAGNOSIS — Z96.641 S/P HIP REPLACEMENT, RIGHT: ICD-10-CM

## 2023-09-26 DIAGNOSIS — Z01.818 PREOP TESTING: ICD-10-CM

## 2023-09-26 DIAGNOSIS — M16.12 PRIMARY OSTEOARTHRITIS OF LEFT HIP: Primary | ICD-10-CM

## 2023-09-26 PROCEDURE — 73521 X-RAY EXAM HIPS BI 2 VIEWS: CPT | Mod: TC,PO

## 2023-09-26 PROCEDURE — 99999 PR PBB SHADOW E&M-EST. PATIENT-LVL III: ICD-10-PCS | Mod: PBBFAC,,, | Performed by: ORTHOPAEDIC SURGERY

## 2023-09-26 PROCEDURE — 73521 X-RAY EXAM HIPS BI 2 VIEWS: CPT | Mod: 26,,, | Performed by: RADIOLOGY

## 2023-09-26 PROCEDURE — 99999 PR PBB SHADOW E&M-EST. PATIENT-LVL III: CPT | Mod: PBBFAC,,, | Performed by: ORTHOPAEDIC SURGERY

## 2023-09-26 PROCEDURE — 73521 XR HIPS BILATERAL 2 VIEW INCL AP PELVIS: ICD-10-PCS | Mod: 26,,, | Performed by: RADIOLOGY

## 2023-09-26 PROCEDURE — 99215 OFFICE O/P EST HI 40 MIN: CPT | Mod: S$PBB,,, | Performed by: ORTHOPAEDIC SURGERY

## 2023-09-26 PROCEDURE — 99213 OFFICE O/P EST LOW 20 MIN: CPT | Mod: PBBFAC,PO | Performed by: ORTHOPAEDIC SURGERY

## 2023-09-26 PROCEDURE — 99215 PR OFFICE/OUTPT VISIT, EST, LEVL V, 40-54 MIN: ICD-10-PCS | Mod: S$PBB,,, | Performed by: ORTHOPAEDIC SURGERY

## 2023-09-26 RX ORDER — MUPIROCIN 20 MG/G
OINTMENT TOPICAL
Status: CANCELLED | OUTPATIENT
Start: 2023-09-26

## 2023-09-26 NOTE — PROGRESS NOTES
CC:  53-year-old male follows up status post right total hip arthroplasty.  Date of surgery was 03/01/2023.  He is almost 7 months out at this point.  Today has no complaints regarding his right hip.  He states he is completely pain-free.  His main complaint is his left hip.  He complains of left groin pain and states that he is ready to proceed with left hip replacement.  He currently rates his pain as a 7/10.    Past Medical History:   Diagnosis Date    Arthritis     Depression     Eczema     GERD (gastroesophageal reflux disease)     Hypertension     Insomnia     Malignant melanoma of skin, unspecified     Rosacea     Sleep apnea        Past Surgical History:   Procedure Laterality Date    COLONOSCOPY N/A 10/4/2022    Procedure: COLONOSCOPY;  Surgeon: Kyle Choi MD;  Location: [facility] ENDO;  Service: Endoscopy;  Laterality: N/A;    COLONOSCOPY N/A 11/1/2022    Procedure: COLONOSCOPY;  Surgeon: Kyle Choi MD;  Location: [facility] ENDO;  Service: Endoscopy;  Laterality: N/A;    ESOPHAGOGASTRODUODENOSCOPY N/A 9/20/2022    Procedure: EGD (ESOPHAGOGASTRODUODENOSCOPY);  Surgeon: Kyle Choi MD;  Location: [facility];  Service: Endoscopy;  Laterality: N/A;    EXCISION OF MELANOMA N/A 03/19/2021    Procedure: EXCISION, MELANOMA;  Surgeon: Tito Irving MD;  Location: [facility];  Service: General;  Laterality: N/A;  excision of melanoma mid back    HIP ARTHROPLASTY Right 3/1/2023    Procedure: ARTHROPLASTY, HIP;  Surgeon: Thompson Perez II, MD;  Location: [facility] OR;  Service: Orthopedics;  Laterality: Right;    KNEE ARTHROSCOPY Left     SHOULDER ARTHROSCOPY Right     ULNAR TUNNEL RELEASE Left        Current Outpatient Medications on File Prior to Visit   Medication Sig Dispense Refill    acetaminophen (TYLENOL) 650 MG TbSR Take 650 mg by mouth every 8 (eight) hours.      aspirin (ECOTRIN) 81 MG EC tablet Take 1 tablet (81 mg total) by mouth 2 (two) times daily. 14 tablet 0    calcium carbonate (TUMS) 200  mg calcium (500 mg) chewable tablet Take 1 tablet by mouth once daily.      doxycycline (ORACEA) 40 mg capsule Take 40 mg by mouth once daily.      HYDROcodone-acetaminophen (NORCO) 7.5-325 mg per tablet Take 1 tablet by mouth every 6 (six) hours as needed for Pain. 28 tablet 0    ibuprofen (ADVIL,MOTRIN) 800 MG tablet Take 800 mg by mouth 3 (three) times daily.      mag/aluminum/sod bicarb/alginc (GAVISCON ORAL) Take by mouth every evening.      meloxicam (MOBIC) 15 MG tablet TAKE 1 TABLET(15 MG) BY MOUTH EVERY DAY 30 tablet 11    metronidazole 1% (METROGEL) 1 % Gel Apply topically once daily.      multivitamin (THERAGRAN) per tablet Take 1 tablet by mouth.      naproxen sodium (ANAPROX) 220 MG tablet Take 220 mg by mouth.      pantoprazole (PROTONIX) 40 MG tablet Take 1 tablet (40 mg total) by mouth once daily. 30 tablet 2    prazosin (MINIPRESS) 2 MG Cap Take 1 mg by mouth once daily.      sertraline (ZOLOFT) 25 MG tablet Take 25 mg by mouth once daily.      SODIUM SULFATE MISC by Misc.(Non-Drug; Combo Route) route. topical      telmisartan (MICARDIS) 40 MG Tab Take 1 tablet (40 mg total) by mouth once daily. 90 tablet 3    traZODone (DESYREL) 50 MG tablet Take 50 mg by mouth every evening.      triamcinolone (KENALOG) 0.5 % ointment Apply topically 2 (two) times daily.       No current facility-administered medications on file prior to visit.       ROS:    Constitution: Denies chills, fever, and sweats.  HENT: Denies headaches or blurry vision.  Cardiovascular: Denies chest pain or irregular heart beat.  Respiratory: Denies cough or shortness of breath.  Gastrointestinal: Denies abdominal pain, nausea, or vomiting.  Genitourinary:  Denies urinary incontinence, bladder and kidney issues  Musculoskeletal:  Denies muscle cramps.  Positive for left hip pain  Neurological: Denies dizziness or focal weakness.  Psychiatric/Behavioral: Normal mental status.  Hematologic/Lymphatic: Denies bleeding problem or easy  bruising/bleeding.  Skin: Denies rash or suspicious lesions.    Physical examination     Gen - No acute distress, well nourished, well groomed   Eyes - Extraoccular motions intact, pupils equally round and reactive to light and accommodation   ENT - normocephalic, atruamtic, oropharynx clear   Neck - Supple, no abnormal masses   Cardiovascular - regular rate and rhythm   Pulmonary - clear to auscultation bilaterally, no wheezes, ronchi, or rales   Abdomen - soft, non-tender, non-distended, positive bowel sounds   Psych - The patient is alert and oriented x3 with normal mood and affect    Examination of the Right Lower Extremity    Skin is intact throughout  Motor in intact EHL,FHL,TA,lesley  +2 DP/PT  Sensation LT intact D/P/1st    Examination of the Right Hip    C-Sign negative  Logroll negative  Stenchfield negative    Pain with ROM negative    ROM:    Flexion   120  Extension   30  Abduction   45  Adduction   20  External Rotation 45  Internal Rotation 35    Flexion contracture negative    FADIR negative  FADER negative    Tenderness to palpation over lateral and posterolateral greater tochanter negative    Examination of the Left Lower Extremity    Skin is intact throughout  Motor in intact EHL,FHL,TA,lesley  +2 DP/PT  Sensation LT intact D/P/1st    Examination of the Left Hip    C-Sign positive  Logroll positive  Stenchfield positive    Pain with ROM positive    ROM:    Flexion   120  Extension   30  Abduction   45  Adduction   20  External Rotation 45  Internal Rotation 35    Flexion contracture negative    FADIR negative  FADER negative    Tenderness to palpation over lateral and posterolateral greater tochanter negative      X-ray images were examined and personally interpreted by me.  Three views of both hips dated 09/26/2023 show a right total hip arthroplasty that is well fixed and in good alignment.  On the left there is severe osteoarthritis with complete loss of joint space, periarticular osteophytes, and  subchondral sclerosis.    Dx:  Status post right total hip arthroplasty, stable.  Patient with severe degenerative arthritis of the left hip.    Plan:  Potential morbidity to the left lower extremity with both operative and non operative treatments were discussed.  Recommendation is for left total hip arthroplasty.  Risks and benefits of the surgery were discussed with the patient.  He verbalized understanding and does wish to proceed.  We will schedule that for the next available date that is convenient for him.

## 2023-09-26 NOTE — H&P (VIEW-ONLY)
CC:  53-year-old male follows up status post right total hip arthroplasty.  Date of surgery was 03/01/2023.  He is almost 7 months out at this point.  Today has no complaints regarding his right hip.  He states he is completely pain-free.  His main complaint is his left hip.  He complains of left groin pain and states that he is ready to proceed with left hip replacement.  He currently rates his pain as a 7/10.    Past Medical History:   Diagnosis Date    Arthritis     Depression     Eczema     GERD (gastroesophageal reflux disease)     Hypertension     Insomnia     Malignant melanoma of skin, unspecified     Rosacea     Sleep apnea        Past Surgical History:   Procedure Laterality Date    COLONOSCOPY N/A 10/4/2022    Procedure: COLONOSCOPY;  Surgeon: Kyle Choi MD;  Location: [facility] ENDO;  Service: Endoscopy;  Laterality: N/A;    COLONOSCOPY N/A 11/1/2022    Procedure: COLONOSCOPY;  Surgeon: Kyle Choi MD;  Location: [facility] ENDO;  Service: Endoscopy;  Laterality: N/A;    ESOPHAGOGASTRODUODENOSCOPY N/A 9/20/2022    Procedure: EGD (ESOPHAGOGASTRODUODENOSCOPY);  Surgeon: Kyle Choi MD;  Location: [facility];  Service: Endoscopy;  Laterality: N/A;    EXCISION OF MELANOMA N/A 03/19/2021    Procedure: EXCISION, MELANOMA;  Surgeon: Tito Irving MD;  Location: [facility];  Service: General;  Laterality: N/A;  excision of melanoma mid back    HIP ARTHROPLASTY Right 3/1/2023    Procedure: ARTHROPLASTY, HIP;  Surgeon: Thompson Perez II, MD;  Location: [facility] OR;  Service: Orthopedics;  Laterality: Right;    KNEE ARTHROSCOPY Left     SHOULDER ARTHROSCOPY Right     ULNAR TUNNEL RELEASE Left        Current Outpatient Medications on File Prior to Visit   Medication Sig Dispense Refill    acetaminophen (TYLENOL) 650 MG TbSR Take 650 mg by mouth every 8 (eight) hours.      aspirin (ECOTRIN) 81 MG EC tablet Take 1 tablet (81 mg total) by mouth 2 (two) times daily. 14 tablet 0    calcium carbonate (TUMS) 200  mg calcium (500 mg) chewable tablet Take 1 tablet by mouth once daily.      doxycycline (ORACEA) 40 mg capsule Take 40 mg by mouth once daily.      HYDROcodone-acetaminophen (NORCO) 7.5-325 mg per tablet Take 1 tablet by mouth every 6 (six) hours as needed for Pain. 28 tablet 0    ibuprofen (ADVIL,MOTRIN) 800 MG tablet Take 800 mg by mouth 3 (three) times daily.      mag/aluminum/sod bicarb/alginc (GAVISCON ORAL) Take by mouth every evening.      meloxicam (MOBIC) 15 MG tablet TAKE 1 TABLET(15 MG) BY MOUTH EVERY DAY 30 tablet 11    metronidazole 1% (METROGEL) 1 % Gel Apply topically once daily.      multivitamin (THERAGRAN) per tablet Take 1 tablet by mouth.      naproxen sodium (ANAPROX) 220 MG tablet Take 220 mg by mouth.      pantoprazole (PROTONIX) 40 MG tablet Take 1 tablet (40 mg total) by mouth once daily. 30 tablet 2    prazosin (MINIPRESS) 2 MG Cap Take 1 mg by mouth once daily.      sertraline (ZOLOFT) 25 MG tablet Take 25 mg by mouth once daily.      SODIUM SULFATE MISC by Misc.(Non-Drug; Combo Route) route. topical      telmisartan (MICARDIS) 40 MG Tab Take 1 tablet (40 mg total) by mouth once daily. 90 tablet 3    traZODone (DESYREL) 50 MG tablet Take 50 mg by mouth every evening.      triamcinolone (KENALOG) 0.5 % ointment Apply topically 2 (two) times daily.       No current facility-administered medications on file prior to visit.       ROS:    Constitution: Denies chills, fever, and sweats.  HENT: Denies headaches or blurry vision.  Cardiovascular: Denies chest pain or irregular heart beat.  Respiratory: Denies cough or shortness of breath.  Gastrointestinal: Denies abdominal pain, nausea, or vomiting.  Genitourinary:  Denies urinary incontinence, bladder and kidney issues  Musculoskeletal:  Denies muscle cramps.  Positive for left hip pain  Neurological: Denies dizziness or focal weakness.  Psychiatric/Behavioral: Normal mental status.  Hematologic/Lymphatic: Denies bleeding problem or easy  bruising/bleeding.  Skin: Denies rash or suspicious lesions.    Physical examination     Gen - No acute distress, well nourished, well groomed   Eyes - Extraoccular motions intact, pupils equally round and reactive to light and accommodation   ENT - normocephalic, atruamtic, oropharynx clear   Neck - Supple, no abnormal masses   Cardiovascular - regular rate and rhythm   Pulmonary - clear to auscultation bilaterally, no wheezes, ronchi, or rales   Abdomen - soft, non-tender, non-distended, positive bowel sounds   Psych - The patient is alert and oriented x3 with normal mood and affect    Examination of the Right Lower Extremity    Skin is intact throughout  Motor in intact EHL,FHL,TA,lesley  +2 DP/PT  Sensation LT intact D/P/1st    Examination of the Right Hip    C-Sign negative  Logroll negative  Stenchfield negative    Pain with ROM negative    ROM:    Flexion   120  Extension   30  Abduction   45  Adduction   20  External Rotation 45  Internal Rotation 35    Flexion contracture negative    FADIR negative  FADER negative    Tenderness to palpation over lateral and posterolateral greater tochanter negative    Examination of the Left Lower Extremity    Skin is intact throughout  Motor in intact EHL,FHL,TA,lesley  +2 DP/PT  Sensation LT intact D/P/1st    Examination of the Left Hip    C-Sign positive  Logroll positive  Stenchfield positive    Pain with ROM positive    ROM:    Flexion   120  Extension   30  Abduction   45  Adduction   20  External Rotation 45  Internal Rotation 35    Flexion contracture negative    FADIR negative  FADER negative    Tenderness to palpation over lateral and posterolateral greater tochanter negative      X-ray images were examined and personally interpreted by me.  Three views of both hips dated 09/26/2023 show a right total hip arthroplasty that is well fixed and in good alignment.  On the left there is severe osteoarthritis with complete loss of joint space, periarticular osteophytes, and  subchondral sclerosis.    Dx:  Status post right total hip arthroplasty, stable.  Patient with severe degenerative arthritis of the left hip.    Plan:  Potential morbidity to the left lower extremity with both operative and non operative treatments were discussed.  Recommendation is for left total hip arthroplasty.  Risks and benefits of the surgery were discussed with the patient.  He verbalized understanding and does wish to proceed.  We will schedule that for the next available date that is convenient for him.

## 2023-09-28 ENCOUNTER — HOSPITAL ENCOUNTER (OUTPATIENT)
Dept: RADIOLOGY | Facility: HOSPITAL | Age: 53
Discharge: HOME | End: 2023-09-28
Attending: ORTHOPAEDIC SURGERY
Payer: OTHER GOVERNMENT

## 2023-09-28 ENCOUNTER — HOSPITAL ENCOUNTER (OUTPATIENT)
Dept: PREADMISSION TESTING | Facility: HOSPITAL | Age: 53
Discharge: HOME | End: 2023-09-28
Attending: ORTHOPAEDIC SURGERY
Payer: OTHER GOVERNMENT

## 2023-09-28 DIAGNOSIS — Z01.818 PREOP TESTING: Primary | ICD-10-CM

## 2023-09-28 DIAGNOSIS — M16.12 PRIMARY OSTEOARTHRITIS OF LEFT HIP: ICD-10-CM

## 2023-09-28 PROCEDURE — 71046 XR CHEST PA AND LATERAL: ICD-10-PCS | Mod: 26,,, | Performed by: RADIOLOGY

## 2023-09-28 PROCEDURE — 71046 X-RAY EXAM CHEST 2 VIEWS: CPT | Mod: TC

## 2023-09-28 PROCEDURE — 93005 ELECTROCARDIOGRAM TRACING: CPT

## 2023-09-28 PROCEDURE — 71046 X-RAY EXAM CHEST 2 VIEWS: CPT | Mod: 26,,, | Performed by: RADIOLOGY

## 2023-09-28 PROCEDURE — 93010 EKG 12-LEAD: ICD-10-PCS | Mod: ,,, | Performed by: INTERNAL MEDICINE

## 2023-09-28 PROCEDURE — 93010 ELECTROCARDIOGRAM REPORT: CPT | Mod: ,,, | Performed by: INTERNAL MEDICINE

## 2023-09-28 NOTE — DISCHARGE INSTRUCTIONS
To confirm, Your doctor has instructed you that surgery is scheduled for: 10/11/23 with DR. AWAD    Please report to [facility], Registration the morning of surgery. You must check-in and receive a wristband before going to your procedure.  [address] [place] [address] [place] [address]    Pre-Op will call the afternoon prior to surgery between 1:00 and 6:00 PM with the final arrival time.  Phone number: [phone]    PLEASE NOTE:  The surgery schedule has many variables which may affect the time of your surgery case.  Family members should be available if your surgery time changes.  Plan to be here the day of your procedure between 4-6 hours.    MEDICATIONS:  TAKE ONLY THESE MEDICATIONS WITH A SMALL SIP OF WATER THE MORNING OF YOUR PROCEDURE:  DOXYCYCLINE, ZOLOFT, PANTOPRAZOLE    NO TELMISARTAN MORNING OF SURGERY BUT DO NOT STOP DAYS BEFORE.      DO NOT TAKE THESE MEDICATIONS 5-7 DAYS PRIOR to your procedure or per your surgeon's request:   ASPIRIN, ALEVE, ADVIL, IBUPROFEN, FISH OIL VITAMIN E, HERBALS, NAPROXEN  (May take Tylenol)    ONLY if you are prescribed any types of blood thinners such as:  Aspirin, Coumadin, Plavix, Pradaxa, Xarelto, Aggrenox, Effient, Eliquis, Savasya, Brilinta, or any other, ask your surgeon whether you should stop taking them and how long before surgery you should stop.  You may also need to verify with the prescribing physician if it is ok to stop your medication.      INSTRUCTIONS IMPORTANT!!  Do not eat or drink anything between midnight and the time of your procedure- this includes gum, mints, and candy.  Do not smoke or drink alcoholic beverages 24 hours prior to your procedure.  Shower the night before AND the morning of your procedure with a Chlorhexidine wash such as Hibiclens or Dial antibacterial soap from the neck down.  Do not get it on your face or in your eyes.  You may use your own shampoo and face wash. This helps your skin to be as bacteria free as  possible.    If you wear contact lenses, dentures, hearing aids or glasses, bring a container to put them in during surgery and give to a family member for safe keeping.  Please leave all jewelry, piercing's and valuables at home. You must remove your false eyelashes prior to surgery.    DO NOT remove hair from the surgery site.  Do not shave the incision site unless you are given specific instructions to do so.    ONLY if you have been diagnosed with sleep apnea please bring your C-PAP machine.  ONLY if you wear home oxygen please bring your portable oxygen tank the day of your procedure.  ONLY if you have a history of OPEN HEART SURGERY you will need a clearance from your Cardiologist per Anesthesia.      ONLY for patients requiring bowel prep, written instructions will be given by your doctor's office.  ONLY if you have a neuro stimulator, please bring the controller with you the morning of surgery  ONLY if a type and screen test is needed before surgery, please return:  If your doctor has scheduled you for an overnight stay, bring a small overnight bag with any personal items you need.  Make arrangements in advance for transportation home by a responsible adult.  It is not safe to drive a vehicle during the 24 hours after anesthesia.          All  facilities and properties are tobacco free.  Smoking is NOT allowed.   If you have any questions about these instructions, call Pre-Op Admit  Nursing at [phone] or the Pre-Op Day Surgery Unit at [phone].

## 2023-09-28 NOTE — PRE ADMISSION SCREENING
"               [facility] Risk Assessment Scale    Patient Name: Tutu Herndon III  YOB: 1970  MRN: [id number]            RIsk Factor Measure Recommendation Patient Data Scale/Score   BMI >40 Reconsider surgery, weight loss   Estimated body mass index is 39.45 kg/m² as calculated from the following:    Height as of 9/26/23: 5' 10" (1.778 m).    Weight as of 9/26/23: 124.7 kg (274 lb 14.6 oz).   [] 0 = 1 - 24.9  [] 1 = 25-29.9  [] 2 = 30-34.9  [x] 3 = 35-39.9  [] 4 = 40-44.9  [] 5 = 45-99.9   Hemoglobin AIC (if applicable) >9 Delay surgery until DM under control  Refer for:  Nutrition Therapy  Exercise   Medication    No results found for: "LABA1C", "HGBA1C"    Lab Results   Component Value Date    GLU 94 09/28/2023      [] 0 = 4.0-5.6  [] 1 = 5.7-6.4  [] 2 = 6.5-6.9  [] 3 = 7.0-7.9  [] 4 = 8.0-8.9  [] 5 = 9.0-12   Hemoglobin (Anemia) <9 Delay surgery   Correct anemia Lab Results   Component Value Date    HGB 16.2 09/28/2023    [] 20 - <9.0                    Albumin <3 Delay surgery &Workup Lab Results   Component Value Date    ALBUMIN 3.9 10/19/2021    [] 20 - <3.0   Smoking Cessation >4 Weeks Delay Surgery  Refer to OP Cessation Class    Never Smoker [] 20 - current smoker                                _____ PPD                    Hx of MI, PE, Arrhythmia, CVA, DVT <30 Days Delay Surgery    N/A [] 20      Infection Variable Delay surgery and re-evaluate   N/A [] 20 - recent/current infection     Depression (PHQ) >10 out of 27 Delay Surgery and re-evaluate  Medication  Counseling              [x] 0     []1     []2     []3      []4      [] 5                    (1-4)      (5-9)  (10-14)  (15-19)   (20-27)     Memory Impairment & Memory loss (Mini-Cog Screening Tool) Advanced dementia and/or Parkinson's Reconsider surgery     [x] 0     []1     []2     []3     []4     [] 5     Physical Conditioning (Modified AM-PAC Per Physical Therapy at [facility]) Unable to ambulate on day of surgery Delay surgery and " re-evaluate  Pre-Rehabilitation   (PT evaluation)       [x]  0   []4       []8     []12        []16     []20       (<20%)   (<40%)   (<60%)   (<80% )    (>80%)     Home Environment/Caregiver support  (Per /Navigator Interview)    Availability of basic services and/or approprate assistance during post-operative period Delay surgery and re-evaluate  Safe home environment  Health   1 week post-surgery  Transportation  availability  Ability to obtain DME/Medications post-op    [x] 0     []1     []2     []3     []4     [] 5  [x] 0     []1     []2     []3     []4     [] 5  [x] 0     []1     []2     []3     []4     [] 5  [x] 0     []1     []2     []3     []4     [] 5         MD Contact: Dr. Thompson Perez Comments:  Total Score:  3

## 2023-09-28 NOTE — PRE ADMISSION SCREENING
JOINT CAMP ASSESSMENT    Name Tutu Herndon III   MRN [id number]    Age/Sex 53 y.o. male    Surgeon Dr. Thompson Perez II   Joint [facility] Date 9/28/2023   Surgery Date 10/11/2023   Procedure Left Hip Arthroplasty   Insurance Payor: [employer] / Plan: [facility] OPTUM / Product Type: Government /    Care Team Patient Care Team:  Luz, Primary Doctor as PCP - General  Karen Bowen LPN as Care Coordinator (Family Medicine)    Pharmacy   [id number] - [place] [address] AT [facility] [address]   [address] [address] [address]  Phone: [phone] Fax: [phone]     AM-PAC Score   24   Risk Assessment Score 3     Past Medical History:   Diagnosis Date    Arthritis     Depression     Eczema     GERD (gastroesophageal reflux disease)     Hypertension     Insomnia     Malignant melanoma of skin, unspecified     Rosacea     Sleep apnea        Past Surgical History:   Procedure Laterality Date    COLONOSCOPY N/A 10/4/2022    Procedure: COLONOSCOPY;  Surgeon: Kyle Choi MD;  Location: [facility] ENDO;  Service: Endoscopy;  Laterality: N/A;    COLONOSCOPY N/A 11/1/2022    Procedure: COLONOSCOPY;  Surgeon: Kyle Choi MD;  Location: [facility] ENDO;  Service: Endoscopy;  Laterality: N/A;    ESOPHAGOGASTRODUODENOSCOPY N/A 9/20/2022    Procedure: EGD (ESOPHAGOGASTRODUODENOSCOPY);  Surgeon: Kyle Choi MD;  Location: [facility] ENDO;  Service: Endoscopy;  Laterality: N/A;    EXCISION OF MELANOMA N/A 03/19/2021    Procedure: EXCISION, MELANOMA;  Surgeon: Tito Irving MD;  Location: [facility] OR;  Service: General;  Laterality: N/A;  excision of melanoma mid back    HIP ARTHROPLASTY Right 3/1/2023    Procedure: ARTHROPLASTY, HIP;  Surgeon: Thompson Perez II, MD;  Location: [facility] OR;  Service: Orthopedics;  Laterality: Right;    KNEE ARTHROSCOPY Left     SHOULDER ARTHROSCOPY Right     ULNAR TUNNEL RELEASE Left          Home Enviroment     Living Arrangement: Lives with  spouse  Home Environment: 1-story house/ trailer, number of outside stairs: 2, tub-shower  Home Safety Concerns: Pets in the home: dogs (4) and cats (2).    DISCHARGE CAREGIVER/SUPPORT SYSTEM     Identified post-op caregiver: Patient has spouse / significant other.  Patient's caregiver(s) will be able to provide physical assistance. Patient will have someone to assist overnight.      Caregiver present at pre-op interview:  Yes      PRE-OPERATIVE FUNCTIONAL STATUS     Employment: Employed full time    Pre-op Functional Status: Patient is independent with mobility/ambulation, transfers, ADL's, IADL's.    Use of assistive device for ambulation: none  ADL: self care  ADL Limitations: difficulty with walking  Medical Restrictions: Unstable ambulation and Decreased range of motions in extremities    POTENTIAL BARRIERS TO DISCHARGE/POTENTIAL POST-OP COMPLICATIONS     Patient with hx of HTN, sleep apnea. Patient had right hip arthroplasty on 03/01/2023 without complication. POSSIBLE SAME DAY DISCHARGE.    DISCHARGE PLAN     Expected LOS of 1 days or less for joint replacement discussed with patient. We also discussed a discharge path of HH for approximately two weeks with a transition to outpatient PT on the third week given no post-op complications.      Patient in agreement with discharge plan: Yes    Discharge to: Discharge home with home health (PT/OT) x2 weeks with transition to outpatient PT     HH:  [facility] ([place], [place]). Patient disclosure form completed and sent to case management for upload to the medical record.      OP PT: [facility] ([place], [place]).     Home DME: rolling walker, bedside commode, and assistive device kit    Needed DME at D/C: none     Rx: Per Dr. Thompson Perez at discharge     Meds to Beds: Yes  Patient expected to discharge on Aspirin 81mg by mouth twice daily for DVT prophylaxis.

## 2023-09-28 NOTE — PRE ADMISSION SCREENING
Patient Name: Tutu Herndon III  YOB: 1970   MRN: [id number]     [facility]   Basic Mobility Inpatient Short Form 6 Clicks         How much difficulty does the patient currently have  Unable  A Lot  A Little  None      1. Turning over in bed (including adjusting bedclothes, sheets and blankets)?     1 []    2 []    3 []    4 [x]        2. Sitting down on and standing up from a chair with arms (e.g., wheelchair, bedside commode, etc.)     1 []  2 []  3 []     4 [x]      3. Moving from lying on back to sitting on the side of the bed?     1 []  2 []  3 []    4 [x]    How much help from another person does the patient currently need  Total  A Lot  A Little  None      4. Moving to and from a bed to a chair (including a wheelchair)?    1 []  2 []  3 []    4 [x]      5. Need to walk in hospital room?    1 []  2 []  3 []    4 [x]      6. Climbing 3-5 steps with a railing?    1 []  2 []  3 []    4 [x]       Raw Score:      24            CMS 0-100% Score:       0     %   Standardized Score:    61.14           CMS Modifier:        [facility] AMPAC   Basic Mobility Inpatient Short Form 6 Clicks Score Conversion Table*         *Use this form to convert -PAC Basic Mobility Inpatient Raw Scores.   [facility] Inpatient Basic Mobility Short Form Scoring Example   1. Add the number values associated with the response to each item. For example, items totals yield a Raw Score of 21.   2. Match the raw score to the t-Scale scores (t-Scale score = 50.25, SE = 4.69).   3. Find the associated CMS % (CMS % = 28.97%).   4. Locate the correct CMS Functional Modifier Code, or G Code (G code = CJ)     NOTE: Each -PAC Short Form has a separate conversion table. Make sure that you use the correct conversion table.       Instruction Manual - page 45 contains conversion table

## 2023-10-10 ENCOUNTER — ANESTHESIA EVENT (OUTPATIENT)
Dept: SURGERY | Facility: HOSPITAL | Age: 53
End: 2023-10-10
Payer: OTHER GOVERNMENT

## 2023-10-10 DIAGNOSIS — M16.12 PRIMARY OSTEOARTHRITIS OF LEFT HIP: ICD-10-CM

## 2023-10-10 DIAGNOSIS — Z96.641 S/P HIP REPLACEMENT, RIGHT: Primary | ICD-10-CM

## 2023-10-10 RX ORDER — ASPIRIN 81 MG/1
81 TABLET ORAL 2 TIMES DAILY
Qty: 28 TABLET | Refills: 0 | Status: SHIPPED | OUTPATIENT
Start: 2023-10-10 | End: 2023-10-24

## 2023-10-10 RX ORDER — HYDROCODONE BITARTRATE AND ACETAMINOPHEN 7.5; 325 MG/1; MG/1
1 TABLET ORAL EVERY 6 HOURS PRN
Qty: 28 TABLET | Refills: 0 | Status: SHIPPED | OUTPATIENT
Start: 2023-10-10

## 2023-10-10 RX ORDER — ONDANSETRON 8 MG/1
8 TABLET, ORALLY DISINTEGRATING ORAL 2 TIMES DAILY PRN
Qty: 30 TABLET | Refills: 0 | Status: SHIPPED | OUTPATIENT
Start: 2023-10-10

## 2023-10-11 ENCOUNTER — ANESTHESIA (OUTPATIENT)
Dept: SURGERY | Facility: HOSPITAL | Age: 53
End: 2023-10-11
Payer: OTHER GOVERNMENT

## 2023-10-11 ENCOUNTER — HOSPITAL ENCOUNTER (OUTPATIENT)
Facility: HOSPITAL | Age: 53
Discharge: HOME | End: 2023-10-11
Attending: ORTHOPAEDIC SURGERY | Admitting: ORTHOPAEDIC SURGERY
Payer: OTHER GOVERNMENT

## 2023-10-11 ENCOUNTER — TELEPHONE (OUTPATIENT)
Dept: ORTHOPEDICS | Facility: CLINIC | Age: 53
End: 2023-10-11

## 2023-10-11 DIAGNOSIS — M16.12 PRIMARY OSTEOARTHRITIS OF LEFT HIP: Primary | ICD-10-CM

## 2023-10-11 DIAGNOSIS — Z01.818 PREOP TESTING: ICD-10-CM

## 2023-10-11 PROCEDURE — 63600175 PHARM REV CODE 636 W HCPCS: Performed by: ANESTHESIOLOGY

## 2023-10-11 PROCEDURE — 71000033 HC RECOVERY, INTIAL HOUR: Performed by: ORTHOPAEDIC SURGERY

## 2023-10-11 PROCEDURE — 71000016 HC POSTOP RECOV ADDL HR: Performed by: ORTHOPAEDIC SURGERY

## 2023-10-11 PROCEDURE — 25000003 PHARM REV CODE 250: Performed by: ORTHOPAEDIC SURGERY

## 2023-10-11 PROCEDURE — 25000003 PHARM REV CODE 250: Performed by: ANESTHESIOLOGY

## 2023-10-11 PROCEDURE — D9220A PRA ANESTHESIA: ICD-10-PCS | Mod: ANES,,, | Performed by: ANESTHESIOLOGY

## 2023-10-11 PROCEDURE — 36000711: Performed by: ORTHOPAEDIC SURGERY

## 2023-10-11 PROCEDURE — 36000710: Performed by: ORTHOPAEDIC SURGERY

## 2023-10-11 PROCEDURE — 27201423 OPTIME MED/SURG SUP & DEVICES STERILE SUPPLY: Performed by: ORTHOPAEDIC SURGERY

## 2023-10-11 PROCEDURE — 97535 SELF CARE MNGMENT TRAINING: CPT

## 2023-10-11 PROCEDURE — 94761 N-INVAS EAR/PLS OXIMETRY MLT: CPT

## 2023-10-11 PROCEDURE — 97161 PT EVAL LOW COMPLEX 20 MIN: CPT

## 2023-10-11 PROCEDURE — 37000008 HC ANESTHESIA 1ST 15 MINUTES: Performed by: ORTHOPAEDIC SURGERY

## 2023-10-11 PROCEDURE — 71000015 HC POSTOP RECOV 1ST HR: Performed by: ORTHOPAEDIC SURGERY

## 2023-10-11 PROCEDURE — 63600175 PHARM REV CODE 636 W HCPCS: Performed by: NURSE ANESTHETIST, CERTIFIED REGISTERED

## 2023-10-11 PROCEDURE — 27200688 HC TRAY, SPINAL-HYPER/ ISOBARIC: Performed by: ANESTHESIOLOGY

## 2023-10-11 PROCEDURE — 97165 OT EVAL LOW COMPLEX 30 MIN: CPT

## 2023-10-11 PROCEDURE — D9220A PRA ANESTHESIA: ICD-10-PCS | Mod: CRNA,,, | Performed by: NURSE ANESTHETIST, CERTIFIED REGISTERED

## 2023-10-11 PROCEDURE — C1776 JOINT DEVICE (IMPLANTABLE): HCPCS | Performed by: ORTHOPAEDIC SURGERY

## 2023-10-11 PROCEDURE — D9220A PRA ANESTHESIA: Mod: CRNA,,, | Performed by: NURSE ANESTHETIST, CERTIFIED REGISTERED

## 2023-10-11 PROCEDURE — 37000009 HC ANESTHESIA EA ADD 15 MINS: Performed by: ORTHOPAEDIC SURGERY

## 2023-10-11 PROCEDURE — D9220A PRA ANESTHESIA: Mod: ANES,,, | Performed by: ANESTHESIOLOGY

## 2023-10-11 PROCEDURE — 25000003 PHARM REV CODE 250: Performed by: NURSE ANESTHETIST, CERTIFIED REGISTERED

## 2023-10-11 PROCEDURE — 97116 GAIT TRAINING THERAPY: CPT

## 2023-10-11 PROCEDURE — 94799 UNLISTED PULMONARY SVC/PX: CPT

## 2023-10-11 PROCEDURE — 63600175 PHARM REV CODE 636 W HCPCS: Performed by: ORTHOPAEDIC SURGERY

## 2023-10-11 PROCEDURE — 27130 TOTAL HIP ARTHROPLASTY: CPT | Mod: LT,,, | Performed by: ORTHOPAEDIC SURGERY

## 2023-10-11 PROCEDURE — 27130 PR TOTAL HIP ARTHROPLASTY: ICD-10-PCS | Mod: LT,,, | Performed by: ORTHOPAEDIC SURGERY

## 2023-10-11 DEVICE — IMPLANTABLE DEVICE: Type: IMPLANTABLE DEVICE | Site: HIP | Status: FUNCTIONAL

## 2023-10-11 DEVICE — HEAD FEMORAL COCR 36MM M2A: Type: IMPLANTABLE DEVICE | Site: HIP | Status: FUNCTIONAL

## 2023-10-11 RX ORDER — PHENYLEPHRINE HYDROCHLORIDE 10 MG/ML
INJECTION INTRAVENOUS
Status: DISCONTINUED | OUTPATIENT
Start: 2023-10-11 | End: 2023-10-11

## 2023-10-11 RX ORDER — PROPOFOL 10 MG/ML
VIAL (ML) INTRAVENOUS CONTINUOUS PRN
Status: DISCONTINUED | OUTPATIENT
Start: 2023-10-11 | End: 2023-10-11

## 2023-10-11 RX ORDER — MUPIROCIN 20 MG/G
OINTMENT TOPICAL
Status: DISCONTINUED | OUTPATIENT
Start: 2023-10-11 | End: 2023-10-11 | Stop reason: HOSPADM

## 2023-10-11 RX ORDER — BUPIVACAINE HYDROCHLORIDE 7.5 MG/ML
INJECTION, SOLUTION EPIDURAL; RETROBULBAR
Status: DISCONTINUED | OUTPATIENT
Start: 2023-10-11 | End: 2023-10-11

## 2023-10-11 RX ORDER — FENTANYL CITRATE 50 UG/ML
INJECTION, SOLUTION INTRAMUSCULAR; INTRAVENOUS
Status: DISCONTINUED | OUTPATIENT
Start: 2023-10-11 | End: 2023-10-11

## 2023-10-11 RX ORDER — LIDOCAINE HYDROCHLORIDE 10 MG/ML
1 INJECTION, SOLUTION EPIDURAL; INFILTRATION; INTRACAUDAL; PERINEURAL ONCE
Status: DISCONTINUED | OUTPATIENT
Start: 2023-10-11 | End: 2023-10-11 | Stop reason: HOSPADM

## 2023-10-11 RX ORDER — TRANEXAMIC ACID 100 MG/ML
INJECTION, SOLUTION INTRAVENOUS
Status: DISCONTINUED | OUTPATIENT
Start: 2023-10-11 | End: 2023-10-11

## 2023-10-11 RX ORDER — ONDANSETRON HYDROCHLORIDE 2 MG/ML
INJECTION, SOLUTION INTRAMUSCULAR; INTRAVENOUS
Status: DISCONTINUED | OUTPATIENT
Start: 2023-10-11 | End: 2023-10-11

## 2023-10-11 RX ORDER — LIDOCAINE HYDROCHLORIDE 20 MG/ML
INJECTION INTRAVENOUS
Status: DISCONTINUED | OUTPATIENT
Start: 2023-10-11 | End: 2023-10-11

## 2023-10-11 RX ORDER — OXYCODONE HYDROCHLORIDE 5 MG/1
5 TABLET ORAL ONCE AS NEEDED
Status: DISCONTINUED | OUTPATIENT
Start: 2023-10-11 | End: 2023-10-11 | Stop reason: HOSPADM

## 2023-10-11 RX ORDER — SODIUM CHLORIDE, SODIUM LACTATE, POTASSIUM CHLORIDE, CALCIUM CHLORIDE 600; 310; 30; 20 MG/100ML; MG/100ML; MG/100ML; MG/100ML
INJECTION, SOLUTION INTRAVENOUS CONTINUOUS
Status: DISCONTINUED | OUTPATIENT
Start: 2023-10-11 | End: 2023-10-11 | Stop reason: HOSPADM

## 2023-10-11 RX ORDER — FENTANYL CITRATE 50 UG/ML
25 INJECTION, SOLUTION INTRAMUSCULAR; INTRAVENOUS EVERY 5 MIN PRN
Status: DISCONTINUED | OUTPATIENT
Start: 2023-10-11 | End: 2023-10-11 | Stop reason: HOSPADM

## 2023-10-11 RX ORDER — DEXAMETHASONE SODIUM PHOSPHATE 4 MG/ML
INJECTION, SOLUTION INTRA-ARTICULAR; INTRALESIONAL; INTRAMUSCULAR; INTRAVENOUS; SOFT TISSUE
Status: DISCONTINUED | OUTPATIENT
Start: 2023-10-11 | End: 2023-10-11

## 2023-10-11 RX ORDER — METOCLOPRAMIDE HYDROCHLORIDE 5 MG/ML
10 INJECTION INTRAMUSCULAR; INTRAVENOUS EVERY 10 MIN PRN
Status: DISCONTINUED | OUTPATIENT
Start: 2023-10-11 | End: 2023-10-11 | Stop reason: HOSPADM

## 2023-10-11 RX ORDER — EPHEDRINE SULFATE 50 MG/ML
INJECTION, SOLUTION INTRAVENOUS
Status: DISCONTINUED | OUTPATIENT
Start: 2023-10-11 | End: 2023-10-11

## 2023-10-11 RX ORDER — MIDAZOLAM HYDROCHLORIDE 1 MG/ML
INJECTION INTRAMUSCULAR; INTRAVENOUS
Status: DISCONTINUED | OUTPATIENT
Start: 2023-10-11 | End: 2023-10-11

## 2023-10-11 RX ORDER — ACETAMINOPHEN 10 MG/ML
INJECTION, SOLUTION INTRAVENOUS
Status: DISCONTINUED | OUTPATIENT
Start: 2023-10-11 | End: 2023-10-11

## 2023-10-11 RX ADMIN — EPHEDRINE SULFATE 10 MG: 50 INJECTION, SOLUTION INTRAMUSCULAR; INTRAVENOUS; SUBCUTANEOUS at 01:10

## 2023-10-11 RX ADMIN — DEXAMETHASONE SODIUM PHOSPHATE 4 MG: 4 INJECTION, SOLUTION INTRA-ARTICULAR; INTRALESIONAL; INTRAMUSCULAR; INTRAVENOUS; SOFT TISSUE at 01:10

## 2023-10-11 RX ADMIN — VANCOMYCIN HYDROCHLORIDE 1000 MG: 1 INJECTION, POWDER, LYOPHILIZED, FOR SOLUTION INTRAVENOUS at 12:10

## 2023-10-11 RX ADMIN — MUPIROCIN: 20 OINTMENT TOPICAL at 10:10

## 2023-10-11 RX ADMIN — BUPIVACAINE HYDROCHLORIDE 1.6 ML: 7.5 INJECTION, SOLUTION EPIDURAL; RETROBULBAR at 12:10

## 2023-10-11 RX ADMIN — SODIUM CHLORIDE, SODIUM GLUCONATE, SODIUM ACETATE, POTASSIUM CHLORIDE AND MAGNESIUM CHLORIDE: 526; 502; 368; 37; 30 INJECTION, SOLUTION INTRAVENOUS at 01:10

## 2023-10-11 RX ADMIN — EPHEDRINE SULFATE 10 MG: 50 INJECTION, SOLUTION INTRAMUSCULAR; INTRAVENOUS; SUBCUTANEOUS at 02:10

## 2023-10-11 RX ADMIN — SODIUM CHLORIDE, SODIUM GLUCONATE, SODIUM ACETATE, POTASSIUM CHLORIDE AND MAGNESIUM CHLORIDE: 526; 502; 368; 37; 30 INJECTION, SOLUTION INTRAVENOUS at 10:10

## 2023-10-11 RX ADMIN — PHENYLEPHRINE HYDROCHLORIDE 200 MCG: 10 INJECTION INTRAVENOUS at 01:10

## 2023-10-11 RX ADMIN — FENTANYL CITRATE 100 MCG: 0.05 INJECTION, SOLUTION INTRAMUSCULAR; INTRAVENOUS at 12:10

## 2023-10-11 RX ADMIN — TRANEXAMIC ACID 1000 MG: 100 INJECTION, SOLUTION INTRAVENOUS at 01:10

## 2023-10-11 RX ADMIN — MIDAZOLAM HYDROCHLORIDE 2 MG: 1 INJECTION, SOLUTION INTRAMUSCULAR; INTRAVENOUS at 12:10

## 2023-10-11 RX ADMIN — PHENYLEPHRINE HYDROCHLORIDE 200 MCG: 10 INJECTION INTRAVENOUS at 12:10

## 2023-10-11 RX ADMIN — ONDANSETRON 4 MG: 2 INJECTION INTRAMUSCULAR; INTRAVENOUS at 01:10

## 2023-10-11 RX ADMIN — SODIUM CHLORIDE, SODIUM GLUCONATE, SODIUM ACETATE, POTASSIUM CHLORIDE AND MAGNESIUM CHLORIDE: 526; 502; 368; 37; 30 INJECTION, SOLUTION INTRAVENOUS at 02:10

## 2023-10-11 RX ADMIN — LIDOCAINE HYDROCHLORIDE 100 MG: 20 INJECTION, SOLUTION INTRAVENOUS at 12:10

## 2023-10-11 RX ADMIN — PROPOFOL 50 MCG/KG/MIN: 10 INJECTION, EMULSION INTRAVENOUS at 12:10

## 2023-10-11 RX ADMIN — ACETAMINOPHEN 1000 MG: 10 INJECTION, SOLUTION INTRAVENOUS at 02:10

## 2023-10-11 NOTE — ANESTHESIA PROCEDURE NOTES
Spinal    Diagnosis: left hip arthroplasty  Patient location during procedure: OR  Start time: 10/11/2023 12:40 PM  Timeout: 10/11/2023 12:40 PM  End time: 10/11/2023 12:50 PM    Staffing  Authorizing Provider: Jah Gallegos MD  Performing Provider: Jah Gallegos MD    Staffing  Performed by: Jah Gallegos MD  Authorized by: Jah Gallegos MD    Preanesthetic Checklist  Completed: patient identified, IV checked, site marked, risks and benefits discussed, surgical consent, monitors and equipment checked, pre-op evaluation and timeout performed  Spinal Block  Patient position: sitting  Prep: Betadine and ChloraPrep  Patient monitoring: heart rate, cardiac monitor, continuous pulse ox, continuous capnometry and frequent blood pressure checks  Approach: midline  Location: L2-3  Injection technique: single shot  CSF Fluid: clear free-flowing CSF  Needle  Needle type: Quincke   Needle gauge: 25 G  Needle length: 5 in  Additional Documentation: incremental injection, negative aspiration for heme and no paresthesia on injection  Needle localization: anatomical landmarks  Assessment  Sensory level: T4   Dermatomal levels determined by pinch or prick  Ease of block: difficult  Patient's tolerance of the procedure: comfortable throughout block and no complaints  Additional Notes  +Barbotage  Medications:    Medications: bupivacaine (pf) (MARCAINE) injection 0.75% - Intraspinal   1.6 mL - 10/11/2023 12:40:00 PM

## 2023-10-11 NOTE — DISCHARGE INSTRUCTIONS
"Discharge Instructions: After Your Surgery/Procedure  Youve just had surgery. During surgery you were given medicine called anesthesia to keep you relaxed and free of pain. After surgery you may have some pain or nausea. This is common. Here are some tips for feeling better and getting well after surgery.     Stay on schedule with your medication.   Going home  Your doctor or nurse will show you how to take care of yourself when you go home. He or she will also answer your questions. Have an adult family member or friend drive you home.      For your safety we recommend these precaution for the first 24 hours after your procedure:  Do not drive or use heavy equipment.  Do not make important decisions or sign legal papers.  Do not drink alcohol.  Have someone stay with you, if needed. He or she can watch for problems and help keep you safe.  Your concentration, balance, coordination, and judgement may be impaired for many hours after anesthesia.  Use caution when ambulating or standing up.     You may feel weak and "washed out" after anesthesia and surgery.      Subtle residual effects of general anesthesia or sedation with regional / local anesthesia can last more than 24 hours.  Rest for the remainder of the day or longer if your Doctor/Surgeon has advised you to do so.  Although you may feel normal within the first 24 hours, your reflexes and mental ability may be impaired without you realizing it.  You may feel dizzy, lightheaded or sleepy for 24 hours or longer.      Be sure to go to all follow-up visits with your doctor. And rest after your surgery for as long as your doctor tells you to.  Coping with pain  If you have pain after surgery, pain medicine will help you feel better. Take it as told, before pain becomes severe. Also, ask your doctor or pharmacist about other ways to control pain. This might be with heat, ice, or relaxation. And follow any other instructions your surgeon or nurse gives you.  Tips " for taking pain medicine  To get the best relief possible, remember these points:  Pain medicines can upset your stomach. Taking them with a little food may help.  Most pain relievers taken by mouth need at least 20 to 30 minutes to start to work.  Taking medicine on a schedule can help you remember to take it. Try to time your medicine so that you can take it before starting an activity. This might be before you get dressed, go for a walk, or sit down for dinner.  Constipation is a common side effect of pain medicines. Call your doctor before taking any medicines such as laxatives or stool softeners to help ease constipation. Also ask if you should skip any foods. Drinking lots of fluids and eating foods such as fruits and vegetables that are high in fiber can also help. Remember, do not take laxatives unless your surgeon has prescribed them.  Drinking alcohol and taking pain medicine can cause dizziness and slow your breathing. It can even be deadly. Do not drink alcohol while taking pain medicine.  Pain medicine can make you react more slowly to things. Do not drive or run machinery while taking pain medicine.  Your health care provider may tell you to take acetaminophen to help ease your pain. Ask him or her how much you are supposed to take each day. Acetaminophen or other pain relievers may interact with your prescription medicines or other over-the-counter (OTC) drugs. Some prescription medicines have acetaminophen and other ingredients. Using both prescription and OTC acetaminophen for pain can cause you to overdose. Read the labels on your OTC medicines with care. This will help you to clearly know the list of ingredients, how much to take, and any warnings. It may also help you not take too much acetaminophen. If you have questions or do not understand the information, ask your pharmacist or health care provider to explain it to you before you take the OTC medicine.  Managing nausea  Some people have an  upset stomach after surgery. This is often because of anesthesia, pain, or pain medicine, or the stress of surgery. These tips will help you handle nausea and eat healthy foods as you get better. If you were on a special food plan before surgery, ask your doctor if you should follow it while you get better. These tips may help:  Do not push yourself to eat. Your body will tell you when to eat and how much.  Start off with clear liquids and soup. They are easier to digest.  Next try semi-solid foods, such as mashed potatoes, applesauce, and gelatin, as you feel ready.  Slowly move to solid foods. Dont eat fatty, rich, or spicy foods at first.  Do not force yourself to have 3 large meals a day. Instead eat smaller amounts more often.  Take pain medicines with a small amount of solid food, such as crackers or toast, to avoid nausea.     Call your surgeon if  You still have pain an hour after taking medicine. The medicine may not be strong enough.  You feel too sleepy, dizzy, or groggy. The medicine may be too strong.  You have side effects like nausea, vomiting, or skin changes, such as rash, itching, or hives.       If you have obstructive sleep apnea  You were given anesthesia medicine during surgery to keep you comfortable and free of pain. After surgery, you may have more apnea spells because of this medicine and other medicines you were given. The spells may last longer than usual.   At home:  Keep using the continuous positive airway pressure (CPAP) device when you sleep. Unless your health care provider tells you not to, use it when you sleep, day or night. CPAP is a common device used to treat obstructive sleep apnea.  Talk with your provider before taking any pain medicine, muscle relaxants, or sedatives. Your provider will tell you about the possible dangers of taking these medicines.  © 8592-0632 [employer]. [address] [place] [address], [place] [address]. All rights reserved. This information is  not intended as a substitute for professional medical care. Always follow your healthcare professional's instructions.           Using an Incentive Spirometer    An incentive spirometer is a device that helps you do deep breathing exercises. These exercises expand your lungs, aid in circulation, and help prevent pneumonia. Deep breathing exercises also help you breathe better and improve the function of your lungs by:  Keeping your lungs clear  Strengthening your breathing muscles  Helping prevent respiratory complications or problems  The incentive spirometer gives you a way to take an active part in recover. A nurse or therapist will teach you breathing exercises. To do these exercises, you will breathe in through your mouth and not your nose. The incentive spirometer only works correctly if you breathe in through your mouth.  Steps to clear lungs  Step 1. Exhale normally. Then, inhale normally.  Relax and breathe out.  Step 2. Place your lips tightly around the mouthpiece.  Make sure the device is upright and not tilted.  Step 3. Inhale as much air as you can through the mouthpiece (don't breath through your nose).  Inhale slowly and deeply.  Hold your breath long enough to keep the balls or disk raised for at least 3 to 5 seconds, or as instructed by your healthcare provider.  Some spirometers have an indicator to let you know that you are breathing in too fast. If the indicator goes off, breathe in more slowly.  Step 4. Repeat the exercise regularly.  Do this exercise every hour while you're awake, or as instructed by your healthcare provider.  If you were taught deep breathing and coughing exercises, do them regularly as instructed by your healthcare provider.            Post op instructions for prevention of DVT  What is deep vein thrombosis?  Deep vein thrombosis (DVT) is the medical term for blood clots in the deep veins of the leg.  These blood clots can be dangerous.  A DVT can block a blood vessel and  keep blood from getting where it needs to go.  Another problem is that the clot can travel to other parts of the body such as the lungs.  A clot that travels to the lungs is called a pulmonary embolus (PE) and can cause serious problems with breathing which can lead to death.  Am I at risk for DVT/PE?  If you are not very active, you are at risk of DVT.  Anyone confined to bed, sitting for long periods of time, recovering from surgery, etc. increases the risk of DVT.  Other risk factors are cancer diagnosis, certain medications, estrogen replacement in any form,older age, obesity, pregnancy, smoking, history of clotting disorders, and dehydration.  How will I know if I have a DVT?  Swelling in the lower leg  Pain  Warmth, redness, hardness or bulging of the vein  If you have any of these symptoms, call your doctors office right away.  Some people will not have any symptoms until the clot moves to the lungs.  What are the symptoms of a PE?  Panting, shortness of breath, or trouble breathing  Sharp, knife-like chest pain when you breathe  Coughing or coughing up blood  Rapid heartbeat  If you have any of these symptoms or get worse quickly, call 911 for emergency treatment.  How can I prevent a DVT?  Avoid long periods of inactivity and dont cross your legs--get up and walk around every hour or so.  Stay active--walking after surgery is highly encouraged.  This means you should get out of the house and walk in the neighborhood.  Going up and down stairs will not impair healing (unless advised against such activity by your doctor).    Drink plenty of noncaffeinated, nonalcoholic fluids each day to prevent dehydration.  Wear special support stockings, if they have been advised by your doctor.  If you travel, stop at least once an hour and walk around.  Avoid smoking (assistance with stopping is available through your healthcare provider)  Always notify your doctor if you are not able to follow the post operative  instructions that are given to you at the time of discharge.  It may be necessary to prescribe one of the medications available to prevent DVT.           We hope your stay was comfortable as you heal now, mend and rest.    For we have enjoyed taking care of you by giving your our best.    And as you get better, by regaining your health and strength;   We count it as a privilege to have served you and hope your time at [facility] was well spent.      Thank  You!!!

## 2023-10-11 NOTE — PLAN OF CARE
Pt prepped for surgery, ABX at BS. Pt belongings, including clothes are in personal belongings bag at post-op locker. Breathing exercises completed by Respiratory. Wife, Rosalinda, signed up for text message alerts. All questions answered, POC explained, v/u, call bell in reach.

## 2023-10-11 NOTE — TRANSFER OF CARE
"Anesthesia Transfer of Care Note    Patient: Tutu Herndon III    Procedure(s) Performed: Procedure(s) (LRB):  ARTHROPLASTY, HIP (Left)    Patient location: PACU    Anesthesia Type: spinal and MAC    Transport from OR: Transported from OR on 2-3 L/min O2 by NC with adequate spontaneous ventilation    Post pain: adequate analgesia    Post assessment: no apparent anesthetic complications    Post vital signs: stable    Level of consciousness: awake    Nausea/Vomiting: no nausea/vomiting    Complications: none    Transfer of care protocol was followed      Last vitals:   Visit Vitals  /79 (BP Location: Right arm, Patient Position: Lying)   Pulse 83   Temp 36.7 °C (98.1 °F) (Temporal)   Resp 16   Ht 5' 10" (1.778 m)   Wt 117.9 kg (260 lb)   SpO2 96%   BMI 37.31 kg/m²     "

## 2023-10-11 NOTE — PLAN OF CARE
Patient cleared by PT/OT to discharge to home. Dressing to left hip remains clean, dry and intact.   Discharge instructions given to pt and family/friend, verbalized understanding and questions answered. Handouts provided. Belongings given back to pt. IV removed- catheter intact. Discharge via wheelchair.

## 2023-10-11 NOTE — PT/OT/SLP EVAL
Occupational Therapy   Evaluation and Discharge Note    Name: Tutu Herndon III  MRN: [id number]  Admitting Diagnosis: Primary osteoarthritis of left hip  Recent Surgery: Procedure(s) (LRB):  ARTHROPLASTY, HIP (Left) Day of Surgery    Recommendations:     Discharge Recommendations: None  Discharge Equipment Recommendations: None; pt owns a bedside commode  Barriers to discharge:  None    Assessment:     Tutu Herndon III is a 53 y.o. male with a medical diagnosis of Primary osteoarthritis of left hip. Pt was agreeable to participate in OT. Pt required CGA and a RW with sit <> stand transfer. Pt required set up A/SBA with UBD. Pt was given instruction and demonstration of use of a reacher to increase his independence with LBD. Pt verbalized understanding and required mod A to don his shorts using a reacher. Pt was given education on hip precautions including no hip flexion greater than 90 degrees, no IR of hip and no adduction of hip. Pt recalled precautions with cues. Pt is scheduled for discharge today and does not require further acute OT services.     Plan:     During this hospitalization, patient does not require further acute OT services.  Please re-consult if situation changes.      Subjective     Chief Complaint: None stated  Patient/Family Comments/goals: Patient agreed to participate in OT.     Occupational Profile:  Living Environment: Pt lives with his wife.  Equipment Used at home: None  Assistance upon Discharge: Pt will have assistance from his wife.    Pain/Comfort:  Pain Rating 1: 0/10    Patients cultural, spiritual, Quaker conflicts given the current situation: yes    Objective:     Communicated with: nurse prior to session.  Patient found up in chair upon OT entry to room.    General Precautions: Standard, fall  Orthopedic Precautions: LLE weight bearing as tolerated, LLE posterior precautions  Braces: N/A  Respiratory Status: Room air     Occupational Performance:    Functional  Mobility/Transfers:  Patient completed Sit <> Stand Transfer with contact guard assistance with rolling walker     Activities of Daily Living:  Upper Body Dressing: set up A  Lower Body Dressing: moderate assistance to don his shorts  Toileting: minimum assistance for clothing managment    Cognitive/Visual Perceptual:  Cognitive/Psychosocial Skills:  -       Oriented to: Person, Place, Time, and Situation   -       Follows Commands/attention: Follows multistep commands  -       Communication: clear/fluent    Physical Exam:  Upper Extremity Range of Motion:  -       Right Upper Extremity: WFL  -       Left Upper Extremity: WFL  Upper Extremity Strength: -       Right Upper Extremity: WFL  -       Left Upper Extremity: WFL      Treatment & Education:  Educated on Posterior hip precautions - patient recalled hip precautions   Educated on use of a reacher for LB dressing   Educated on safety with functional mobility; hand placement to ensure safe transfers to various surfaces in prep for ADLs   Importance of use of DME to maintain precautions    Patient left up in chair with all lines intact, nurse notified and patient's wife present.    GOALS:   Multidisciplinary Problems       Occupational Therapy Goals       Not on file                    History:     Past Medical History:   Diagnosis Date    Arthritis     Depression     Eczema     GERD (gastroesophageal reflux disease)     Hypertension     Insomnia     Malignant melanoma of skin, unspecified     Rosacea     Sleep apnea          Past Surgical History:   Procedure Laterality Date    COLONOSCOPY N/A 10/4/2022    Procedure: COLONOSCOPY;  Surgeon: Kyle Choi MD;  Location: [facility];  Service: Endoscopy;  Laterality: N/A;    COLONOSCOPY N/A 11/1/2022    Procedure: COLONOSCOPY;  Surgeon: Kyle Choi MD;  Location: [facility];  Service: Endoscopy;  Laterality: N/A;    ESOPHAGOGASTRODUODENOSCOPY N/A 9/20/2022    Procedure: EGD (ESOPHAGOGASTRODUODENOSCOPY);   Surgeon: Kyle Choi MD;  Location: [facility];  Service: Endoscopy;  Laterality: N/A;    EXCISION OF MELANOMA N/A 03/19/2021    Procedure: EXCISION, MELANOMA;  Surgeon: Tito Irving MD;  Location: [facility] OR;  Service: General;  Laterality: N/A;  excision of melanoma mid back    HIP ARTHROPLASTY Right 3/1/2023    Procedure: ARTHROPLASTY, HIP;  Surgeon: Thompson Perez II, MD;  Location: [facility] OR;  Service: Orthopedics;  Laterality: Right;    KNEE ARTHROSCOPY Left     SHOULDER ARTHROSCOPY Right     ULNAR TUNNEL RELEASE Left        Time Tracking:     OT Date of Treatment: 10/11/23  OT Start Time: 1630  OT Stop Time: 1653  OT Total Time (min): 23 min    Billable Minutes:Evaluation 10  Self Care/Home Management 13    10/11/2023

## 2023-10-11 NOTE — DISCHARGE SUMMARY
[facility]  Department of Orthopedic Surgery  Discharge Note  Short Stay    Admit Date: 10/11/2023    Discharge Date and Time: No discharge date for patient encounter.     Attending Physician: Thompson Perez II, MD     Discharge Provider: Thompson Perez II    Diagnoses:  Active Hospital Problems    Diagnosis  POA    *Primary osteoarthritis of left hip [M16.12]  Yes      Resolved Hospital Problems   No resolved problems to display.       Discharged Condition: good    Hospital Course: Patient was admitted for an outpatient procedure and tolerated the procedure well with no complications.    Final Diagnoses: Same as principal problem.    Disposition: Home or Self Care    Follow up/Patient Instructions:    Medications:  Reconciled Home Medications:      Medication List        CONTINUE taking these medications      acetaminophen 650 MG Tbsr  Commonly known as: TYLENOL  Take 650 mg by mouth every 8 (eight) hours.     * aspirin 81 MG EC tablet  Commonly known as: ECOTRIN  Take 1 tablet (81 mg total) by mouth 2 (two) times a day. for 14 days     calcium carbonate 200 mg calcium (500 mg) chewable tablet  Commonly known as: TUMS  Take 1 tablet by mouth once daily.     doxycycline 40 mg capsule  Commonly known as: ORACEA  Take 40 mg by mouth once daily.     GAVISCON ORAL  Take by mouth every evening.     HYDROcodone-acetaminophen 7.5-325 mg per tablet  Commonly known as: NORCO  Take 1 tablet by mouth every 6 (six) hours as needed for Pain.     ibuprofen 800 MG tablet  Commonly known as: ADVIL,MOTRIN  Take 800 mg by mouth 3 (three) times daily.     meloxicam 15 MG tablet  Commonly known as: MOBIC  TAKE 1 TABLET(15 MG) BY MOUTH EVERY DAY     metronidazole 1% 1 % Gel  Commonly known as: METROGEL  Apply topically once daily.     multivitamin per tablet  Commonly known as: THERAGRAN  Take 1 tablet by mouth.     naproxen sodium 220 MG tablet  Commonly known as: ANAPROX  Take 220 mg by mouth.     ondansetron 8 MG  Tbdl  Commonly known as: ZOFRAN-ODT  Take 1 tablet (8 mg total) by mouth 2 (two) times daily as needed.     pantoprazole 40 MG tablet  Commonly known as: PROTONIX  Take 1 tablet (40 mg total) by mouth once daily.     prazosin 2 MG Cap  Commonly known as: MINIPRESS  Take 1 mg by mouth every evening.     sertraline 25 MG tablet  Commonly known as: ZOLOFT  Take 25 mg by mouth once daily.     SODIUM SULFATE MISC  by Misc.(Non-Drug; Combo Route) route. topical     telmisartan 40 MG Tab  Commonly known as: MICARDIS  Take 1 tablet (40 mg total) by mouth once daily.     traZODone 50 MG tablet  Commonly known as: DESYREL  Take 50 mg by mouth every evening.     triamcinolone 0.5 % ointment  Commonly known as: KENALOG  Apply topically 2 (two) times daily.           * This list has 1 medication(s) that are the same as other medications prescribed for you. Read the directions carefully, and ask your doctor or other care provider to review them with you.                ASK your doctor about these medications      * aspirin 81 MG EC tablet  Commonly known as: ECOTRIN  Take 1 tablet (81 mg total) by mouth 2 (two) times daily.           * This list has 1 medication(s) that are the same as other medications prescribed for you. Read the directions carefully, and ask your doctor or other care provider to review them with you.                Discharge Procedure Orders   Diet Adult Regular     Ice to affected area     Notify your health care provider if you experience any of the following:  increased confusion or weakness     Notify your health care provider if you experience any of the following:  persistent dizziness, light-headedness, or visual disturbances     Notify your health care provider if you experience any of the following:  worsening rash     Notify your health care provider if you experience any of the following:  severe persistent headache     Notify your health care provider if you experience any of the following:   difficulty breathing or increased cough     Notify your health care provider if you experience any of the following:  redness, tenderness, or signs of infection (pain, swelling, redness, odor or green/yellow discharge around incision site)     Notify your health care provider if you experience any of the following:  severe uncontrolled pain     Notify your health care provider if you experience any of the following:  persistent nausea and vomiting or diarrhea     Notify your health care provider if you experience any of the following:  temperature >100.4     Leave dressing on - Keep it clean, dry, and intact until clinic visit     Weight bearing restrictions (specify):   Order Comments: WBAT LLE      Follow-up Information       [facility]. Call in 1 day(s).    Specialties: Home Health Services, Home Therapy Services, Home Living Aide Services  Contact information:  [address] [place] [id number] [phone]                           Discharge Procedure Orders (must include Diet, Follow-up, Activity):   Discharge Procedure Orders (must include Diet, Follow-up, Activity)   Diet Adult Regular     Ice to affected area     Notify your health care provider if you experience any of the following:  increased confusion or weakness     Notify your health care provider if you experience any of the following:  persistent dizziness, light-headedness, or visual disturbances     Notify your health care provider if you experience any of the following:  worsening rash     Notify your health care provider if you experience any of the following:  severe persistent headache     Notify your health care provider if you experience any of the following:  difficulty breathing or increased cough     Notify your health care provider if you experience any of the following:  redness, tenderness, or signs of infection (pain, swelling, redness, odor or green/yellow discharge around incision site)      Notify your health care provider if you experience any of the following:  severe uncontrolled pain     Notify your health care provider if you experience any of the following:  persistent nausea and vomiting or diarrhea     Notify your health care provider if you experience any of the following:  temperature >100.4     Leave dressing on - Keep it clean, dry, and intact until clinic visit     Weight bearing restrictions (specify):   Order Comments: WBAT LLE

## 2023-10-11 NOTE — TELEPHONE ENCOUNTER
----- Message from Marisabel Sawyer LPN sent at 10/10/2023  5:31 PM CDT -----  Contact: Pat COLEY    ----- Message -----  From: Vikram Santos MA  Sent: 10/10/2023   3:41 PM CDT  To: Ana [facility]ay Staff    [facility] home health authorization.  Paperwork was sent to office this past Friday & needs to be signed & faxed back. Fax number is [phone]

## 2023-10-11 NOTE — ANESTHESIA POSTPROCEDURE EVALUATION
Anesthesia Post Evaluation    Patient: Tutu Herndon III    Procedure(s) Performed: Procedure(s) (LRB):  ARTHROPLASTY, HIP (Left)    Final Anesthesia Type: spinal      Patient location during evaluation: PACU  Patient participation: Yes- Able to Participate  Level of consciousness: awake and alert and oriented  Post-procedure vital signs: reviewed and stable  Pain management: adequate  Airway patency: patent    PONV status at discharge: No PONV  Anesthetic complications: no      Cardiovascular status: blood pressure returned to baseline and stable  Respiratory status: unassisted and spontaneous ventilation  Hydration status: euvolemic  Follow-up not needed.          Vitals Value Taken Time   /58 10/11/23 1502   Temp 36.7 °C (98.1 °F) 10/11/23 1435   Pulse 82 10/11/23 1504   Resp 16 10/11/23 1504   SpO2 97 % 10/11/23 1504   Vitals shown include unvalidated device data.      No case tracking events are documented in the log.      Pain/Iglesia Score: Iglesia Score: 10 (10/11/2023  3:00 PM)

## 2023-10-11 NOTE — ANESTHESIA PREPROCEDURE EVALUATION
10/11/2023  Tutu Herndon III is a 53 y.o., male.      Pre-op Assessment    I have reviewed the Patient Summary Reports.     I have reviewed the Nursing Notes. I have reviewed the NPO Status.   I have reviewed the Medications.     Review of Systems  Anesthesia Hx:  No problems with previous Anesthesia    Social:  Non-Smoker    Hematology/Oncology:         -- Cancer in past history (melanoma):   Cardiovascular:   Hypertension, well controlled    Pulmonary:   Sleep Apnea    Renal/:  Renal/ Normal     Hepatic/GI:   GERD, well controlled    Musculoskeletal:   Arthritis     Neurological:  Neurology Normal    Endocrine:  Endocrine Normal  Obesity / BMI > 30, Morbid Obesity / BMI > 40  Dermatological:   Rosacea   Psych:   Psychiatric History depression          Physical Exam  General: Well nourished, Cooperative, Alert and Oriented    Airway:  Mallampati: II   Mouth Opening: Normal  TM Distance: Normal  Neck ROM: Normal ROM        Anesthesia Plan  Type of Anesthesia, risks & benefits discussed:    Anesthesia Type: Gen ETT, Gen Supraglottic Airway, Gen Natural Airway, MAC  Intra-op Monitoring Plan: Standard ASA Monitors  Post Op Pain Control Plan: multimodal analgesia  Induction:  IV  Airway Plan: Direct, Video and Fiberoptic, Post-Induction  Informed Consent: Informed consent signed with the Patient and all parties understand the risks and agree with anesthesia plan.  All questions answered.   ASA Score: 3    Ready For Surgery From Anesthesia Perspective.     .       Normal

## 2023-10-11 NOTE — PT/OT/SLP EVAL
Physical Therapy Evaluation    Patient Name:  Ttuu Herndon III   MRN:  [id number]    Recommendations:     Discharge Recommendations: home health PT   Discharge Equipment Recommendations: none   Barriers to discharge: None    Assessment:     Tutu Herndon III is a 53 y.o. male admitted with a medical diagnosis of Primary osteoarthritis of left hip.  Pt seen at post  05, alert and eager for therapies. Stated of no pain. Pt with hx of R ALMA ROSA 7 months ago and did well. Pt completed thera ex in supine. Min assist to stand and ambulated 250ft with RW min to CGA. Pt has no urge to void at this time. OOB chair.  Pt re educated on L posterior hip precautions  Pt to benefit from HHPT    Rehab Prognosis: Fair; patient would benefit from acute skilled PT services to address these deficits and reach maximum level of function.    Recent Surgery: Procedure(s) (LRB):  ARTHROPLASTY, HIP (Left) Day of Surgery    Plan:     During this hospitalization, patient to be seen   to address the identified rehab impairments via   and progress toward the following goals:    Plan of Care Expires:  10/11/23    Subjective   Pt stated he recovered well post R ALMA ROSA 7 months ago and busy taking care of G children  Chief Complaint: none  Patient/Family Comments/goals: get well  Pain/Comfort:  Pain Rating 1: 0/10    Patients cultural, spiritual, Yazidi conflicts given the current situation:      Living Environment:  Home with family  Prior to admission, patients level of function was independent.  Equipment used at home: none.  DME owned (not currently used): rolling walker.  Upon discharge, patient will have assistance from family.    Objective:     Communicated with nurse Cruz prior to session.  Patient found HOB elevated with    upon PT entry to room.    General Precautions: Standard, fall  Orthopedic Precautions:LLE weight bearing as tolerated, LLE posterior precautions   Braces: N/A  Respiratory Status: Room air    Exams:  Postural Exam:   Patient presented with the following abnormalities:    -       Rounded shoulders  -       Forward head  -       BMI 37  RLE ROM: WFL  RLE Strength: WFL  LLE ROM: Deficits: within L posterior hip precautions  LLE Strength: Deficits: 3/5    Functional Mobility:  Bed Mobility:     Scooting: minimum assistance  Supine to Sit: minimum assistance  Transfers:     Sit to Stand:  minimum assistance with rolling walker  Bed to Chair: minimum assistance with  rolling walker  using  Stand Pivot  Gait: 250ft with RW min assist with good step and RW sequencing      AM-PAC 6 CLICK MOBILITY  Total Score:16       Treatment & Education:  Patient was educated on the importance of OOB activity and functional mobility to negate negative effects of prolonged bed rest during hospitalization, safe transfers and ambulation, and D/C planning   Pt completed thera ex with AP,QS/GS,HS,SLR within hip precautions  Gait at hallways with RW and OOB chair  Spouse assisting with care    Patient left up in chair with all lines intact, call button in reach, and nurse Nancy notified.    GOALS:   Multidisciplinary Problems       Physical Therapy Goals       Not on file                    History:     Past Medical History:   Diagnosis Date    Arthritis     Depression     Eczema     GERD (gastroesophageal reflux disease)     Hypertension     Insomnia     Malignant melanoma of skin, unspecified     Rosacea     Sleep apnea        Past Surgical History:   Procedure Laterality Date    COLONOSCOPY N/A 10/4/2022    Procedure: COLONOSCOPY;  Surgeon: Kyle Choi MD;  Location: [facility];  Service: Endoscopy;  Laterality: N/A;    COLONOSCOPY N/A 11/1/2022    Procedure: COLONOSCOPY;  Surgeon: Klye Choi MD;  Location: [facility];  Service: Endoscopy;  Laterality: N/A;    ESOPHAGOGASTRODUODENOSCOPY N/A 9/20/2022    Procedure: EGD (ESOPHAGOGASTRODUODENOSCOPY);  Surgeon: Kyle Choi MD;  Location: [facility];  Service: Endoscopy;  Laterality: N/A;     EXCISION OF MELANOMA N/A 03/19/2021    Procedure: EXCISION, MELANOMA;  Surgeon: Tito Irving MD;  Location: [facility] OR;  Service: General;  Laterality: N/A;  excision of melanoma mid back    HIP ARTHROPLASTY Right 3/1/2023    Procedure: ARTHROPLASTY, HIP;  Surgeon: Thompson Perez II, MD;  Location: [facility] OR;  Service: Orthopedics;  Laterality: Right;    KNEE ARTHROSCOPY Left     SHOULDER ARTHROSCOPY Right     ULNAR TUNNEL RELEASE Left        Time Tracking:     PT Received On: 10/11/23  PT Start Time: 1558     PT Stop Time: 1620  PT Total Time (min): 22 min     Billable Minutes: Evaluation 10 and Gait Training 12      10/11/2023

## 2023-10-11 NOTE — OP NOTE
[facility]  Orthopedic Surgery Department  Operative Note    SUMMARY     Date of Procedure: 10/11/2023     Procedure: Procedure(s) (LRB):  ARTHROPLASTY, HIP (Left)     Surgeon(s) and Role:     * Thompson Perez II, MD - Primary    Assisting Surgeon: None    First Assist:  JURGEN Mendez    Pre-Operative Diagnosis: Primary osteoarthritis of left hip [M16.12]  Preop testing [Z01.818]    Post-Operative Diagnosis: Post-Op Diagnosis Codes:     * Primary osteoarthritis of left hip [M16.12]     * Preop testing [Z01.818]    Anesthesia: General    Technical Procedures Used: Leftt total hip arthroplasty    Description of the Findings of the Procedure: Dictated    Significant Surgical Tasks Conducted by the Assistant(s), if Applicable: Positioning and prepping, retraction during exposure and implant insertion, wound closure and bandage application    Complications: No    Estimated Blood Loss (EBL): * No values recorded between 10/11/2023  1:23 PM and 10/11/2023  2:11 PM *           Implants:   Implant Name Type Inv. Item Serial No.  Lot No. LRB No. Used Action   G7 ACETABULAR SYSTEM, 60MM SHELL SIZE, LINER SIZE G, UNCEMENTED 3-HOLE, ACETABULAR SHELL    [employer] [id number] Left 1 Implanted   G7 ACETABULAR SYSTEM, LONGEVITY HIGHLY CROSSLINKED POLY LINER, NEUTRAL    [employer] [id number] Left 1 Implanted   HEAD FEMORAL COCR 36MM M2A - [id number]  HEAD FEMORAL COCR 36MM M2A  [employer] [id number] Left 1 Implanted   STEM ECHO HIP [id number] - [id number]  STEM ECHO HIP [id number]  [employer] [id number] Left 1 Implanted       Specimens:   Specimen (24h ago, onward)      None                    Condition: Good    Disposition: PACU - hemodynamically stable.    Attestation: I was present for the entire procedure.    Procedure In Detail:      Procedure in Detail:  The patient was brought to the operating room and placed on the table in the supine position.  They underwent anesthesia with the  anesthesia service.  They were then rolled into the Right lateral decubitus position with the Left hip up and secured with the peg board.  The Left hip was then prepped and draped in the normal sterile fashion.  A posterior approach to the hip was created.  Dissection was taken down through skin and subcutaneous tissue to the tensor fascia clarita and fascia over the gluteus elsy.  The fascia was sharply incised and then blunt finger dissection was taken through the elsy musculature.  A Charnley retractor was placed.  The bursa was swept posteriorly.  The sciatic nerve was palpated and protected through out the remainder of the case.      The interval between the piriformis and the gluteus minimus was developed with a garza elevator.  The piriformis and other short external rotators were then taken as one cuff of tissue along with the hip capsule and tagged with a #5 Ethibond for later repair.  This lead to excellent exposure of the hip joint which was noted to be severely arthritic.  The hip was dislocated and the femoral neck was exposed.  A austin was made on the femoral neck with the bovie at 20mm proximal to the top of the lessor troachanter, based on pre-operative measurements.  The femoral neck cut was made and the head removed.  Hohmann retractors were placed anterior and posterior to the acetabulum and the femur was retracted anteriorly.  A capsulotomy was performed.  This lead to excellent exposure of the acetabulum.  The remnant of the labrum was resected and the cotyloid notch was cleared of soft tissue.  The transverse acetabular ligament was identified.  Sequential reaming was then undertaken to size 59 mm.  A 59 mm trial was noted to fit and fill the acetabulum appropriately.  A 60 mm Sudha [employer] G7 cup was then impacted in the acetabulum.  When fully seated the entire pelvis with rock when attempting to move the cup.  A trial liner was place and then attention was turned to the femur.    A box  osteotome was used open the proximal femur.  A canal finding Reamer followed by lateralizing Reamer was then used.  Sequential reaming was then undertaken to size 12.  Sequential broaching was then undertaken to size 12.  With the broach in place we then trialed different head neck combinations.  With a standard neck and a +0 head leg lengths clinically appeared equal.  There was no excessive Shuck.  The hip could be flexed to 90° and internally rotated to 60 degrees before subluxation.  We felt this to be a good stable construct.  The trials were then removed.  A neutal liner was impacted into the acetabular shell.  A size 12 Sudha [employer] Echo stem was then impacted into the femur.  A +0 head was then impacted onto the neck of the stem.  The hip was then reduced.  With the final components in place the hip had the same stability and range of motion as it did with the trials.  The hip was then placed in neutral position irrigated with normal saline and then closure was begun.    The short external rotators and hip capsule repaired to the medius tendon with Ethibond suture.  We then closed the fascia with a 0 PDS strata fix.  We closed deep to the subdermal layer with a 2 0 PDS strata fix and the subcuticular layer was closed with 3-0 Monocryl.  A Dermabond device was applied to the skin.  Once that had dried, a sterile Bioclusive intraoperative dressing was placed.  The patient was then rolled supine on the transport gurney with a hip abduction pillow in place.  A polar Care device was applied for postoperative pain control.  X-rays obtained in the operating room showed a well-fixed implant in good alignment with equal leg lengths.  The patient was then taken recovery where they were noted to be stable postoperatively.  Needle and lap counts were correct at the end of the case.

## 2023-10-11 NOTE — PLAN OF CARE
Patient received from recovery at this time.  AAOX3.  NAD noted.  Dressing to left hip remains clean, dry and intact. Tolerating po intake well with no complaints of nausea/vomiting.  Patient able to move BLE with no problems noted.  Due to void.   Medications delivered to spouse earlier in the waiting area.

## 2023-10-12 VITALS
WEIGHT: 260 LBS | BODY MASS INDEX: 37.22 KG/M2 | HEART RATE: 89 BPM | SYSTOLIC BLOOD PRESSURE: 111 MMHG | OXYGEN SATURATION: 97 % | HEIGHT: 70 IN | RESPIRATION RATE: 18 BRPM | TEMPERATURE: 98 F | DIASTOLIC BLOOD PRESSURE: 62 MMHG

## 2023-10-12 NOTE — PROGRESS NOTES
10/12/23  very pleased with care given.  States all staff were kind and supportive.  States he has read and understands all discharge orders.  Has not heard from HH/PT yet - will call them if he doesn't hear from them this afternoon.   Bandar

## 2023-10-17 ENCOUNTER — TELEPHONE (OUTPATIENT)
Dept: ORTHOPEDICS | Facility: CLINIC | Age: 53
End: 2023-10-17
Payer: OTHER GOVERNMENT

## 2023-10-17 NOTE — TELEPHONE ENCOUNTER
----- Message from [place] sent at 10/17/2023 12:27 PM CDT -----  Regarding: checking on [place] paperwork, call pt   Contact: pt   checking on [facility] paperwork, call pt

## 2023-10-18 ENCOUNTER — NURSE TRIAGE (OUTPATIENT)
Dept: ADMINISTRATIVE | Facility: CLINIC | Age: 53
End: 2023-10-18
Payer: OTHER GOVERNMENT

## 2023-10-18 ENCOUNTER — TELEPHONE (OUTPATIENT)
Dept: ORTHOPEDICS | Facility: CLINIC | Age: 53
End: 2023-10-18
Payer: OTHER GOVERNMENT

## 2023-10-18 NOTE — TELEPHONE ENCOUNTER
Pt had hip replacement on last Wednesday. He is having some stabbing pain in the mid thigh on the left leg. Comes and goes. Pain is 5-6/10 when it hits. Pain last from a few seconds to a minute. Pt stated its a severe pain. Pt has not been taking the pain medications because he is trying to avoid narcotics. He has been taking tylenol and ibuprofen. Pt would like to be seen. Care advice recommend pt see md today or tomorrow. Please call and advice pt. Pt is awaiting a return call [phone].  Reason for Disposition   Patient wants to be seen    Additional Information   Negative: Sounds like a life-threatening emergency to the triager   Negative: Bright red, wide-spread, sunburn-like rash   Negative: SEVERE headache and after spinal (epidural) anesthesia   Negative: Vomiting and persists > 4 hours   Negative: Vomiting and abdomen looks much more swollen than usual   Negative: Drinking very little and dehydration suspected (e.g., no urine > 12 hours, very dry mouth, very lightheaded)   Negative: Patient sounds very sick or weak to the triager   Negative: Sounds like a serious complication to the triager   Negative: Fever > 100.4 F (38.0 C)   Negative: Caller has URGENT question and triager unable to answer question   Negative: SEVERE post-op pain (e.g., excruciating, pain scale 8-10) that is not controlled with pain medications   Negative: Headache and after spinal (epidural) anesthesia and not severe   Negative: Fever present > 3 days (72 hours)    Protocols used: Post-Op Symptoms and [id number]

## 2023-10-18 NOTE — TELEPHONE ENCOUNTER
----- Message from Marixa Thompson MA sent at 10/18/2023 11:23 AM CDT -----  Contact: pt  Thinks he is having a problem with hip replacement  Thigh pain,   he did not have on last surgery for opposite hip    Call back

## 2023-10-19 DIAGNOSIS — Z96.642 STATUS POST TOTAL REPLACEMENT OF LEFT HIP: Primary | ICD-10-CM

## 2023-10-20 ENCOUNTER — PATIENT MESSAGE (OUTPATIENT)
Dept: ORTHOPEDICS | Facility: CLINIC | Age: 53
End: 2023-10-20
Payer: OTHER GOVERNMENT

## 2023-10-23 ENCOUNTER — TELEPHONE (OUTPATIENT)
Dept: ORTHOPEDICS | Facility: CLINIC | Age: 53
End: 2023-10-23
Payer: OTHER GOVERNMENT

## 2023-10-23 NOTE — TELEPHONE ENCOUNTER
----- Message from Isaura Dupree sent at 10/23/2023  1:25 PM CDT -----  Regarding: Home Health nurse update  The nurse  for this patient stopped by to speak to someone about this patient's home health stay. She would like someone to give her a call to give an update. Her name is Maddison Moreno [phone]. Thanks

## 2023-10-26 ENCOUNTER — HOSPITAL ENCOUNTER (OUTPATIENT)
Dept: RADIOLOGY | Facility: HOSPITAL | Age: 53
Discharge: HOME | End: 2023-10-26
Attending: ORTHOPAEDIC SURGERY
Payer: OTHER GOVERNMENT

## 2023-10-26 ENCOUNTER — OFFICE VISIT (OUTPATIENT)
Dept: ORTHOPEDICS | Facility: CLINIC | Age: 53
End: 2023-10-26
Payer: OTHER GOVERNMENT

## 2023-10-26 VITALS — WEIGHT: 259.94 LBS | BODY MASS INDEX: 37.21 KG/M2 | HEIGHT: 70 IN

## 2023-10-26 DIAGNOSIS — Z96.642 STATUS POST TOTAL REPLACEMENT OF LEFT HIP: ICD-10-CM

## 2023-10-26 DIAGNOSIS — Z96.642 STATUS POST TOTAL REPLACEMENT OF LEFT HIP: Primary | ICD-10-CM

## 2023-10-26 PROCEDURE — 73502 X-RAY EXAM HIP UNI 2-3 VIEWS: CPT | Mod: TC,PO,LT

## 2023-10-26 PROCEDURE — 99024 PR POST-OP FOLLOW-UP VISIT: ICD-10-PCS | Mod: ,,, | Performed by: ORTHOPAEDIC SURGERY

## 2023-10-26 PROCEDURE — 99214 OFFICE O/P EST MOD 30 MIN: CPT | Mod: PBBFAC,PO | Performed by: ORTHOPAEDIC SURGERY

## 2023-10-26 PROCEDURE — 99999 PR PBB SHADOW E&M-EST. PATIENT-LVL IV: ICD-10-PCS | Mod: PBBFAC,,, | Performed by: ORTHOPAEDIC SURGERY

## 2023-10-26 PROCEDURE — 99024 POSTOP FOLLOW-UP VISIT: CPT | Mod: ,,, | Performed by: ORTHOPAEDIC SURGERY

## 2023-10-26 PROCEDURE — 73502 XR HIP WITH PELVIS WHEN PERFORMED, 2 OR 3 VIEWS LEFT: ICD-10-PCS | Mod: 26,LT,, | Performed by: RADIOLOGY

## 2023-10-26 PROCEDURE — 73502 X-RAY EXAM HIP UNI 2-3 VIEWS: CPT | Mod: 26,LT,, | Performed by: RADIOLOGY

## 2023-10-26 PROCEDURE — 99999 PR PBB SHADOW E&M-EST. PATIENT-LVL IV: CPT | Mod: PBBFAC,,, | Performed by: ORTHOPAEDIC SURGERY

## 2023-10-26 NOTE — PROGRESS NOTES
CC:  53-year-old male follows up status post left total hip arthroplasty.  Date of surgery was 10/11/2023.  He is 2 weeks postop.  He currently reports he is not having any pain.  We have had an issue with home health physical therapy.  [facility] and for [facility] reason [facility] has not [facility].  He had his right hip replaced earlier this year and remembers what he did with physical therapy when he had his right hip done.  He has been doing a home exercise program in his doing extremely well at this point.  He can bend over to tie his own shoes he has been walking and exercising and is currently pain free.    Examination of the Left Lower Extremity    Postoperative bandage was taken down and incision inspected.  Skin is intact throughout, healing well with no sign of infection  Motor in intact EHL,FHL,TA,lesley  +2 DP/PT  Sensation LT intact D/P/1st    Examination of the Left Hip    C-Sign negative  Logroll negative  Stenchfield negative    Pain with ROM negative    ROM:    Flexion   120  Extension   30  Abduction   45  Adduction   20  External Rotation 45  Internal Rotation 35    Flexion contracture negative    FADIR negative  FADER negative    Tenderness to palpation over lateral and posterolateral greater tochanter negative    X-ray images were examined and personally interpreted by me.  Three views of the left hip including an AP pelvis show bilateral total hip arthroplasties that both appear well fixed and in good alignment.    Dx:  Status post left total hip arthroplasty, stable and doing extremely well    Plan:  We discussed wound care again.  Shower with soap and water and pat the incision dry.  No petroleum based products.  Follow up in 4 weeks for re-evaluation.

## 2023-11-15 ENCOUNTER — PATIENT MESSAGE (OUTPATIENT)
Dept: ORTHOPEDICS | Facility: CLINIC | Age: 53
End: 2023-11-15
Payer: OTHER GOVERNMENT

## 2023-11-24 DIAGNOSIS — Z96.642 STATUS POST TOTAL REPLACEMENT OF LEFT HIP: Primary | ICD-10-CM

## 2023-11-27 ENCOUNTER — OFFICE VISIT (OUTPATIENT)
Dept: ORTHOPEDICS | Facility: CLINIC | Age: 53
End: 2023-11-27
Payer: OTHER GOVERNMENT

## 2023-11-27 ENCOUNTER — HOSPITAL ENCOUNTER (OUTPATIENT)
Dept: RADIOLOGY | Facility: HOSPITAL | Age: 53
Discharge: HOME | End: 2023-11-27
Attending: ORTHOPAEDIC SURGERY
Payer: OTHER GOVERNMENT

## 2023-11-27 VITALS — HEIGHT: 70 IN | WEIGHT: 259 LBS | BODY MASS INDEX: 37.08 KG/M2

## 2023-11-27 DIAGNOSIS — Z96.642 STATUS POST TOTAL REPLACEMENT OF LEFT HIP: Primary | ICD-10-CM

## 2023-11-27 DIAGNOSIS — Z96.642 STATUS POST TOTAL REPLACEMENT OF LEFT HIP: ICD-10-CM

## 2023-11-27 PROCEDURE — 99999 PR PBB SHADOW E&M-EST. PATIENT-LVL III: ICD-10-PCS | Mod: PBBFAC,,, | Performed by: ORTHOPAEDIC SURGERY

## 2023-11-27 PROCEDURE — 99999 PR PBB SHADOW E&M-EST. PATIENT-LVL III: CPT | Mod: PBBFAC,,, | Performed by: ORTHOPAEDIC SURGERY

## 2023-11-27 PROCEDURE — 73502 XR HIP WITH PELVIS WHEN PERFORMED, 2 OR 3 VIEWS LEFT: ICD-10-PCS | Mod: 26,LT,, | Performed by: RADIOLOGY

## 2023-11-27 PROCEDURE — 99213 OFFICE O/P EST LOW 20 MIN: CPT | Mod: PBBFAC,PO | Performed by: ORTHOPAEDIC SURGERY

## 2023-11-27 PROCEDURE — 73502 X-RAY EXAM HIP UNI 2-3 VIEWS: CPT | Mod: TC,PO,LT

## 2023-11-27 PROCEDURE — 73502 X-RAY EXAM HIP UNI 2-3 VIEWS: CPT | Mod: 26,LT,, | Performed by: RADIOLOGY

## 2023-11-27 PROCEDURE — 99024 PR POST-OP FOLLOW-UP VISIT: ICD-10-PCS | Mod: ,,, | Performed by: ORTHOPAEDIC SURGERY

## 2023-11-27 PROCEDURE — 99024 POSTOP FOLLOW-UP VISIT: CPT | Mod: ,,, | Performed by: ORTHOPAEDIC SURGERY

## 2023-11-27 NOTE — PROGRESS NOTES
CC:  53-year-old male follows up status post left total hip arthroplasty.  Date of surgery was 10/11/2023.  This is his 6 week follow-up.  Overall is doing well and has no pain today.  He does report an intermittent pain that comes and goes but it does not last long.    Examination of the Left Lower Extremity    Skin is intact throughout  Motor in intact EHL,FHL,TA,lesley  +2 DP/PT  Sensation LT intact D/P/1st    Examination of the Left Hip    C-Sign negative  Logroll negative  Stenchfield negative    Pain with ROM negative    ROM:    Flexion   120  Extension   30  Abduction   45  Adduction   20  External Rotation 45  Internal Rotation 35    Flexion contracture negative    FADIR negative  FADER negative    Tenderness to palpation over lateral and posterolateral greater tochanter negative    X-ray images were examined and personally interpreted by me.  Three views left hip dated 11/27/2023 show left total hip arthroplasty that is well fixed and in good alignment.    Dx:  Status post left total hip arthroplasty, stable    Plan:  Progress activity as tolerated.  Follow up in 6 weeks with an x-ray.

## 2024-01-03 DIAGNOSIS — Z96.642 STATUS POST TOTAL REPLACEMENT OF LEFT HIP: Primary | ICD-10-CM

## 2024-01-08 ENCOUNTER — HOSPITAL ENCOUNTER (OUTPATIENT)
Dept: RADIOLOGY | Facility: HOSPITAL | Age: 54
Discharge: HOME | End: 2024-01-08
Attending: ORTHOPAEDIC SURGERY
Payer: OTHER GOVERNMENT

## 2024-01-08 ENCOUNTER — OFFICE VISIT (OUTPATIENT)
Dept: ORTHOPEDICS | Facility: CLINIC | Age: 54
End: 2024-01-08
Payer: OTHER GOVERNMENT

## 2024-01-08 VITALS — WEIGHT: 259.06 LBS | HEIGHT: 70 IN | BODY MASS INDEX: 37.09 KG/M2

## 2024-01-08 DIAGNOSIS — Z96.642 STATUS POST TOTAL REPLACEMENT OF LEFT HIP: Primary | ICD-10-CM

## 2024-01-08 DIAGNOSIS — Z96.642 STATUS POST TOTAL REPLACEMENT OF LEFT HIP: ICD-10-CM

## 2024-01-08 PROCEDURE — 99214 OFFICE O/P EST MOD 30 MIN: CPT | Mod: PBBFAC,PO | Performed by: ORTHOPAEDIC SURGERY

## 2024-01-08 PROCEDURE — 99024 POSTOP FOLLOW-UP VISIT: CPT | Mod: ,,, | Performed by: ORTHOPAEDIC SURGERY

## 2024-01-08 PROCEDURE — 73502 X-RAY EXAM HIP UNI 2-3 VIEWS: CPT | Mod: TC,PO,LT

## 2024-01-08 PROCEDURE — 73502 X-RAY EXAM HIP UNI 2-3 VIEWS: CPT | Mod: 26,LT,, | Performed by: RADIOLOGY

## 2024-01-08 PROCEDURE — 99999 PR PBB SHADOW E&M-EST. PATIENT-LVL IV: CPT | Mod: PBBFAC,,, | Performed by: ORTHOPAEDIC SURGERY

## 2024-01-08 NOTE — PROGRESS NOTES
CC:  53-year-old male follows up status post left total hip arthroplasty.  Date of surgery was 10/11/2023.  He is almost 3 months out at this point.  Overall is doing well.  He is currently pain free.  He is back in the gym and exercising.  No complaints.    Examination of the Left Lower Extremity    Skin is intact throughout  Motor in intact EHL,FHL,TA,lesley  +2 DP/PT  Sensation LT intact D/P/1st    Examination of the Left Hip    C-Sign negative  Logroll negative  Stenchfield negative    Pain with ROM negative    ROM:    Flexion   120  Extension   30  Abduction   45  Adduction   20  External Rotation 45  Internal Rotation 35    Flexion contracture negative    FADIR negative  FADER negative    Tenderness to palpation over lateral and posterolateral greater tochanter negative    X-ray images were examined and personally interpreted by me.  Three views of the left hip including AP pelvis dated 01/08/2024 show a left total hip arthroplasty that is well fixed and in good alignment.  Incidentally on the AP there has a right total hip arthroplasty also well fixed and in good alignment.    Dx:  Status post left total hip arthroplasty, stable    Plan:  Activity as tolerated.  Follow up in 3 months with an x-ray.

## 2024-04-05 DIAGNOSIS — Z96.642 STATUS POST TOTAL REPLACEMENT OF LEFT HIP: Primary | ICD-10-CM

## 2024-10-02 ENCOUNTER — TELEPHONE (OUTPATIENT)
Dept: UROLOGY | Facility: CLINIC | Age: 54
End: 2024-10-02
Payer: OTHER GOVERNMENT

## 2024-10-07 ENCOUNTER — OFFICE VISIT (OUTPATIENT)
Dept: UROLOGY | Facility: CLINIC | Age: 54
End: 2024-10-07
Payer: OTHER GOVERNMENT

## 2024-10-07 VITALS — BODY MASS INDEX: 48.52 KG/M2 | WEIGHT: 247.13 LBS | HEIGHT: 60 IN

## 2024-10-07 DIAGNOSIS — N48.6 PEYRONIE DISEASE: Primary | ICD-10-CM

## 2024-10-07 PROCEDURE — 99214 OFFICE O/P EST MOD 30 MIN: CPT | Mod: PBBFAC,PO

## 2024-10-07 PROCEDURE — 99203 OFFICE O/P NEW LOW 30 MIN: CPT | Mod: S$PBB,,,

## 2024-10-07 PROCEDURE — 99999 PR PBB SHADOW E&M-EST. PATIENT-LVL IV: CPT | Mod: PBBFAC,,,

## 2024-10-07 NOTE — PROGRESS NOTES
"[facility] Note  Staff: RENE Zamarripa    PCP: None    Chief Complaint: Peyronie's    Subjective:        HPI: Tutu Herndon III is a 54 y.o. male NEW PATIENT presents today with complaint of painful erections. He states this has been happening for the past 2-3 weeks. He also states there is now a curvature of approximately 30 degrees. He states this is affecting penetration.    We discussed Peyronies disease. We discussed the use of NSAIDs prior to sexual activity. He denies dysuria, hematuria, and difficulty urinating.     Questions asked the pt during ov today:  Urgency: No, urge incontinence? No  NTF: 1x night  Dysuria: No  Gross Hematuria:No  Straining:No, Hesistancy:No, Intermittency:No}, Weak stream:No    Last PSA Screening: No results found for: "PSA", "PSADIAG"    History of Kidney Stones?:  No    Constipation issues?:  No    REVIEW OF SYSTEMS:  Review of Systems   Constitutional: Negative.  Negative for chills and fever.   Gastrointestinal: Negative.  Negative for abdominal pain, constipation, diarrhea, nausea and vomiting.   Genitourinary: Negative.  Negative for dysuria, flank pain, frequency, hematuria and urgency.   Musculoskeletal: Negative.  Negative for back pain.       PMHx:  Past Medical History:   Diagnosis Date    Arthritis     Depression     Eczema     GERD (gastroesophageal reflux disease)     Hypertension     Insomnia     Malignant melanoma of skin, unspecified     Rosacea     Sleep apnea        PSHx:  Past Surgical History:   Procedure Laterality Date    COLONOSCOPY N/A 10/4/2022    Procedure: COLONOSCOPY;  Surgeon: Kyle Choi MD;  Location: [facility];  Service: Endoscopy;  Laterality: N/A;    COLONOSCOPY N/A 11/1/2022    Procedure: COLONOSCOPY;  Surgeon: Kyle Choi MD;  Location: [facility];  Service: Endoscopy;  Laterality: N/A;    ESOPHAGOGASTRODUODENOSCOPY N/A 9/20/2022    Procedure: EGD (ESOPHAGOGASTRODUODENOSCOPY);  Surgeon: Kyle FLEMING" MD Steph;  Location: [facility] ENDO;  Service: Endoscopy;  Laterality: N/A;    EXCISION OF MELANOMA N/A 03/19/2021    Procedure: EXCISION, MELANOMA;  Surgeon: Tito Irving MD;  Location: [facility] OR;  Service: General;  Laterality: N/A;  excision of melanoma mid back    HIP ARTHROPLASTY Right 3/1/2023    Procedure: ARTHROPLASTY, HIP;  Surgeon: Thompson Perez II, MD;  Location: [facility] OR;  Service: Orthopedics;  Laterality: Right;    HIP ARTHROPLASTY Left 10/11/2023    Procedure: ARTHROPLASTY, HIP;  Surgeon: Thompson Perez II, MD;  Location: [facility] OR;  Service: Orthopedics;  Laterality: Left;  Desmond Self [employer]omet    KNEE ARTHROSCOPY Left     SHOULDER ARTHROSCOPY Right     ULNAR TUNNEL RELEASE Left        Fam Hx:   malignancies: No    kidney stones: No     Soc Hx:  , lives in [place]    Allergies:  Lisinopril and Penicillins    Medications: reviewed     Objective:   There were no vitals filed for this visit.    Physical Exam  Constitutional:       Appearance: Normal appearance.   Pulmonary:      Effort: Pulmonary effort is normal.   Abdominal:      General: There is no distension.      Palpations: Abdomen is soft.      Tenderness: There is no abdominal tenderness.   Musculoskeletal:         General: Normal range of motion.      Cervical back: Normal range of motion.   Skin:     General: Skin is warm.   Neurological:      Mental Status: He is alert and oriented to person, place, and time.   Psychiatric:         Mood and Affect: Mood normal.         Behavior: Behavior normal.          EXAM performed by me in office today:  induration felt dorsal penis near base  No scrotal rashes, cysts or lesions  Epididymis normal in size, no tenderness  Testes normal and size, equal size bilaterally, no masses  Urethral meatus normal without discharge      Assessment:       1. Peyronie disease          Plan:     Referral placed to Dr. Oswald for further evaluation and treatment    Had extensive discussion with  patient about the natural history of Peyronie's disease.  Exam and history consistent with Peyronie's disease, in early active phase.      Discussed active phase and stable phase.  Active phase being 12-18 months of penile pain and progressive curvature, usually after inciting trauma, which he does not have a history of - and also noted often spontaneous.  We did discuss during this time the only recommendation is NSAIDs to control discomfort  if present, which he currently does not have, and it is a progressive process and may see curvature worsen in coming months.  No oral agents have been found to be useful.         In the stable phase,  there are multiple treatment options including plaque injection, collagenase injection, penile plication, incision and grafting, etc.  We did discuss that in this clinic we do not have the means to evaluate and treat peyronies disease.  We did discuss typically the 1st step is a Doppler ultrasound of the penis with measurement of curvature and location of plaque and indentations after induced erection.  This would help determine blood flow, degree of curvature, and treatment course.  Not only can we not perform this in our clinic, but we also do not have any of the injectable medications for plaque injection as this is often the 1st line when treating Peyronie's plaques.         F/u per treatment plan    [facility]: Active    RENE Zamarripa

## 2024-10-07 NOTE — PATIENT INSTRUCTIONS
Had extensive discussion with patient about the natural history of Peyronie's disease.  Exam and history consistent with Peyronie's disease, in early active phase.         Discussed active phase and stable phase.  Active phase being 12-18 months of penile pain and progressive curvature, usually after inciting trauma, which he does not have a history of - and also noted often spontaneous.  We did discuss during this time the only recommendation is NSAIDs to control discomfort  if present, which he currently does not have, and it is a progressive process and may see curvature worsen in coming months.  No oral agents have been found to be useful.         In the stable phase,  there are multiple treatment options including plaque injection, collagenase injection, penile plication, incision and grafting, etc.  We did discuss that in this clinic we do not have the means to evaluate and treat peyronies disease.  We did discuss typically the 1st step is a Doppler ultrasound of the penis with measurement of curvature and location of plaque and indentations after induced erection.  This would help determine blood flow, degree of curvature, and treatment course.  Not only can we not perform this in our clinic, but we also do not have any of the injectable medications for plaque injection as this is often the 1st line when treating Peyronie's plaques.       I did provide the names of urologists specializing in treatment of this condition.

## 2024-10-14 ENCOUNTER — OFFICE VISIT (OUTPATIENT)
Dept: UROLOGY | Facility: CLINIC | Age: 54
End: 2024-10-14
Payer: OTHER GOVERNMENT

## 2024-10-14 ENCOUNTER — LAB VISIT (OUTPATIENT)
Dept: LAB | Facility: HOSPITAL | Age: 54
End: 2024-10-14
Attending: UROLOGY
Payer: OTHER GOVERNMENT

## 2024-10-14 VITALS
SYSTOLIC BLOOD PRESSURE: 139 MMHG | HEIGHT: 70 IN | DIASTOLIC BLOOD PRESSURE: 92 MMHG | HEART RATE: 64 BPM | WEIGHT: 250.88 LBS | BODY MASS INDEX: 35.92 KG/M2

## 2024-10-14 DIAGNOSIS — N40.1 BPH WITH URINARY OBSTRUCTION: ICD-10-CM

## 2024-10-14 DIAGNOSIS — N13.8 BPH WITH URINARY OBSTRUCTION: ICD-10-CM

## 2024-10-14 DIAGNOSIS — N52.01 ERECTILE DYSFUNCTION DUE TO ARTERIAL INSUFFICIENCY: ICD-10-CM

## 2024-10-14 DIAGNOSIS — N48.6 PEYRONIE'S DISEASE: Primary | ICD-10-CM

## 2024-10-14 DIAGNOSIS — I10 ESSENTIAL HYPERTENSION: ICD-10-CM

## 2024-10-14 PROBLEM — Z12.11 SCREENING FOR COLON CANCER: Status: RESOLVED | Noted: 2020-11-19 | Resolved: 2024-10-14

## 2024-10-14 LAB — COMPLEXED PSA SERPL-MCNC: 1 NG/ML (ref 0–4)

## 2024-10-14 PROCEDURE — 99999 PR PBB SHADOW E&M-EST. PATIENT-LVL V: CPT | Mod: PBBFAC,,, | Performed by: UROLOGY

## 2024-10-14 PROCEDURE — 36415 COLL VENOUS BLD VENIPUNCTURE: CPT | Performed by: UROLOGY

## 2024-10-14 PROCEDURE — 84153 ASSAY OF PSA TOTAL: CPT | Performed by: UROLOGY

## 2024-10-14 PROCEDURE — 99215 OFFICE O/P EST HI 40 MIN: CPT | Mod: PBBFAC | Performed by: UROLOGY

## 2024-10-14 PROCEDURE — 99214 OFFICE O/P EST MOD 30 MIN: CPT | Mod: S$PBB,,, | Performed by: UROLOGY

## 2024-10-14 NOTE — PROGRESS NOTES
CHIEF COMPLAINT:    Mr. Herndon is a 54 y.o. male presenting for a consultation at the request of VAL Méndez. Patient presents with peyronie's disease.    PRESENTING ILLNESS:    Tutu Herndon III is a 54 y.o. male who c/o peyronie's disease.  Has been present for approximately 1 year.  He has 40 degrees of curve to the left.  It is difficult to penetrate due to the curve.  Sex is not painful for him or his partner due to the curve.    He does  have ED. Good results with Viagra.    He has LUTS.  Nocturia x 0-1.  Is pleased with how he voids.  REVIEW OF SYSTEMS:    Tutu Herndon III denies headache, blurred vision, fever, nausea, vomiting, chills, abdominal pain, chest pain, sore throat, bleeding per rectum, cough, SOB, recent loss of consciousness, recent mental status changes, seizures, dizziness, or upper or lower extremity weakness.    ISIDRO  1. 2  2. 3  3. 3  4. 3  5. 5      PATIENT HISTORY:    Past Medical History:   Diagnosis Date    Arthritis     Depression     Eczema     GERD (gastroesophageal reflux disease)     Hypertension     Insomnia     Malignant melanoma of skin, unspecified     Rosacea     Sleep apnea        Past Surgical History:   Procedure Laterality Date    COLONOSCOPY N/A 10/4/2022    Procedure: COLONOSCOPY;  Surgeon: Kyle Choi MD;  Location: [facility];  Service: Endoscopy;  Laterality: N/A;    COLONOSCOPY N/A 11/1/2022    Procedure: COLONOSCOPY;  Surgeon: Kyle Choi MD;  Location: [facility] ENDO;  Service: Endoscopy;  Laterality: N/A;    ESOPHAGOGASTRODUODENOSCOPY N/A 9/20/2022    Procedure: EGD (ESOPHAGOGASTRODUODENOSCOPY);  Surgeon: Kyle Choi MD;  Location: [facility] ENDO;  Service: Endoscopy;  Laterality: N/A;    EXCISION OF MELANOMA N/A 03/19/2021    Procedure: EXCISION, MELANOMA;  Surgeon: Tito Irving MD;  Location: [facility];  Service: General;  Laterality: N/A;  excision of melanoma mid back    HIP ARTHROPLASTY Right 3/1/2023    Procedure: ARTHROPLASTY, HIP;   Surgeon: Thompson Perez II, MD;  Location: [facility] OR;  Service: Orthopedics;  Laterality: Right;    HIP ARTHROPLASTY Left 10/11/2023    Procedure: ARTHROPLASTY, HIP;  Surgeon: Thompson Perez II, MD;  Location: [facility] OR;  Service: Orthopedics;  Laterality: Left;  Demsond Self Biomet    KNEE ARTHROSCOPY Left     SHOULDER ARTHROSCOPY Right     ULNAR TUNNEL RELEASE Left        Family History   Problem Relation Name Age of Onset    Cancer Mother      Esophageal cancer Father      Cancer Father      Colon cancer Sister  46    Esophageal cancer Brother      Stomach cancer Neg Hx         Social History     Socioeconomic History    Marital status:    Tobacco Use    Smoking status: Never    Smokeless tobacco: Never   Substance and Sexual Activity    Alcohol use: Yes     Comment: rare    Drug use: Never    Sexual activity: Yes     Partners: Female     Social Drivers of Health     Financial Resource Strain: Low Risk  (10/6/2024)    Overall Financial Resource Strain (CARDIA)     Difficulty of Paying Living Expenses: Not very hard   Food Insecurity: Patient Declined (10/6/2024)    Hunger Vital Sign     Worried About Running Out of Food in the Last Year: Patient declined     Ran Out of Food in the Last Year: Patient declined   Transportation Needs: No Transportation Needs (11/18/2020)    PRAPARE - Transportation     Lack of Transportation (Medical): No     Lack of Transportation (Non-Medical): No   Physical Activity: Insufficiently Active (10/6/2024)    Exercise Vital Sign     Days of Exercise per Week: 2 days     Minutes of Exercise per Session: 30 min   Stress: Stress Concern Present (10/6/2024)    [employer] [place] [place] [employer] - Occupational Stress Questionnaire     Feeling of Stress : Rather much   Housing Stability: Unknown (10/6/2024)    Housing Stability Vital Sign     Unable to Pay for Housing in the Last Year: No       Allergies:  Lisinopril and Penicillins    Medications:    Current Outpatient  Medications:     acetaminophen (TYLENOL) 650 MG TbSR, Take 650 mg by mouth every 8 (eight) hours., Disp: , Rfl:     calcium carbonate (TUMS) 200 mg calcium (500 mg) chewable tablet, Take 1 tablet by mouth once daily., Disp: , Rfl:     doxycycline (ORACEA) 40 mg capsule, Take 40 mg by mouth once daily., Disp: , Rfl:     ibuprofen (ADVIL,MOTRIN) 800 MG tablet, Take 800 mg by mouth 3 (three) times daily., Disp: , Rfl:     mag/aluminum/sod bicarb/alginc (GAVISCON ORAL), Take by mouth every evening., Disp: , Rfl:     metronidazole 1% (METROGEL) 1 % Gel, Apply topically once daily., Disp: , Rfl:     multivitamin (THERAGRAN) per tablet, Take 1 tablet by mouth., Disp: , Rfl:     naproxen sodium (ANAPROX) 220 MG tablet, Take 220 mg by mouth., Disp: , Rfl:     ondansetron (ZOFRAN-ODT) 8 MG TbDL, Take 1 tablet (8 mg total) by mouth 2 (two) times daily as needed., Disp: 30 tablet, Rfl: 0    prazosin (MINIPRESS) 2 MG Cap, Take 1 mg by mouth every evening., Disp: , Rfl:     sertraline (ZOLOFT) 25 MG tablet, Take 25 mg by mouth once daily., Disp: , Rfl:     SODIUM SULFATE MISC, by Misc.(Non-Drug; Combo Route) route. topical, Disp: , Rfl:     traZODone (DESYREL) 50 MG tablet, Take 50 mg by mouth every evening., Disp: , Rfl:     triamcinolone (KENALOG) 0.5 % ointment, Apply topically 2 (two) times daily., Disp: , Rfl:     aspirin (ECOTRIN) 81 MG EC tablet, Take 1 tablet (81 mg total) by mouth 2 (two) times daily., Disp: 14 tablet, Rfl: 0    meloxicam (MOBIC) 15 MG tablet, TAKE 1 TABLET(15 MG) BY MOUTH EVERY DAY (Patient not taking: Reported on 10/14/2024), Disp: 30 tablet, Rfl: 11    pantoprazole (PROTONIX) 40 MG tablet, Take 1 tablet (40 mg total) by mouth once daily., Disp: 30 tablet, Rfl: 2    telmisartan (MICARDIS) 40 MG Tab, Take 1 tablet (40 mg total) by mouth once daily., Disp: 90 tablet, Rfl: 3    PHYSICAL EXAMINATION:    The patient generally appears in good health, is appropriately interactive, and is in no apparent  "distress.     Eyes: anicteric sclerae, moist conjunctivae; no lid-lag; PERRLA     HENT: Atraumatic; oropharynx clear with moist mucous membranes and no mucosal ulcerations;normal hard and soft palate.  No evidence of lymphadenopathy.    Neck: Trachea midline.  No thyromegaly.    Skin: No lesions.    Mental: Cooperative with normal affect.  Is oriented to time, place, and person.    Neuro: Grossly intact.    Chest: Normal inspiratory effort.   No accessory muscles.  No audible wheezes.  Respirations symmetric on inspiration and expiration.    Heart: Regular rhythm.      Abdomen:  Soft, non-tender. No masses or organomegaly. Bladder is not palpable. No evidence of flank discomfort. No evidence of inguinal hernia.    Genitourinary: The penis is circumcised with a plaque c/w peyronie's on the shaft. The urethral meatus is normal. The testes, epididymides, and cord structures are normal in size and contour bilaterally. The scrotum is normal in size and contour.    Normal anal sphincter tone. No rectal mass.    The prostate is 40 g. Normal landmarks. Lateral sulci. Median furrow intact.  No nodularity or induration. Seminal vesicles are normal.    Extremities: No clubbing, cyanosis, or edema      LABS:      No results found for: "PSA", "PSADIAG", "PSATOTAL", "PSAFREE", "PSAFREEPCT"    IMPRESSION:    Encounter Diagnoses   Name Primary?    Peyronie's disease Yes    BPH with urinary obstruction     Erectile dysfunction due to arterial insufficiency     Essential hypertension    HTN, stable      PLAN:    1. Discussed the pathophysiology of peyronie's.  Will order penile doppler US.  have explained the risk, benefits, and alternatives of the procedure in detail. The patient voices understanding and all questions have been answered. The patient agrees to proceed as planned.    2. Will draw a PSA today.  3.  Discussed options for his ED (affected by above comorbidities).  Continue Viagra.  4. Will observe his LUTS as they don't " bother him.      Copy to:

## 2024-10-24 ENCOUNTER — TELEPHONE (OUTPATIENT)
Dept: UROLOGY | Facility: CLINIC | Age: 54
End: 2024-10-24
Payer: OTHER GOVERNMENT

## 2024-10-25 ENCOUNTER — DOCUMENTATION ONLY (OUTPATIENT)
Dept: UROLOGY | Facility: CLINIC | Age: 54
End: 2024-10-25
Payer: OTHER GOVERNMENT

## 2024-10-25 ENCOUNTER — PROCEDURE VISIT (OUTPATIENT)
Dept: UROLOGY | Facility: CLINIC | Age: 54
End: 2024-10-25
Payer: OTHER GOVERNMENT

## 2024-10-25 VITALS
DIASTOLIC BLOOD PRESSURE: 78 MMHG | SYSTOLIC BLOOD PRESSURE: 122 MMHG | BODY MASS INDEX: 34.95 KG/M2 | WEIGHT: 243.63 LBS | RESPIRATION RATE: 18 BRPM | HEART RATE: 59 BPM

## 2024-10-25 DIAGNOSIS — N48.6 PEYRONIE'S DISEASE: ICD-10-CM

## 2024-10-25 RX ORDER — PAPAVERINE HYDROCHLORIDE 30 MG/ML
90 INJECTION INTRAMUSCULAR; INTRAVENOUS
Status: COMPLETED | OUTPATIENT
Start: 2024-10-25 | End: 2024-10-25

## 2024-10-25 RX ADMIN — PAPAVERINE HYDROCHLORIDE 90 MG: 30 INJECTION INTRAMUSCULAR; INTRAVENOUS at 10:10

## 2024-10-25 NOTE — PATIENT INSTRUCTIONS
After your Doppler US Procedure/PEP injection:    You may resume your usual diet, medications and activities following the procedure. Some side effects may occur that usually do not need medical attention. These side effects may go away during treatment as your body adjusts to the medicine. Contact your [facility]ist at [phone] if any of the following side effects continue or become extremely bothersome or if you have any questions.    Bruising or bleeding at place of injection   Burning (mild) along penis   Swelling at place of injection    You may also reach a urology resident on call after regular clinic hours and on weekends at [phone]. Papaverine injected into the penis may cause tingling at the tip of the penis. This is no cause for concern.

## 2024-10-25 NOTE — PROGRESS NOTES
After Yanick's doppler, we discussed surgical options for his peyronie's.  We discussed IPP, Xiaflex, plication, and incision and grafting as well as the indications for each.  We discussed these in conjunction with his ultrasound findings.  He was given the chance to ask questions.  Alternative treatments were discussed as well.  He would like to proceed with plication.    We discussed the risks and benefits of penile plication for Peyronie's disease.  We discussed penile shortening, ED, infection, pain, failure to improve the curve, and decreased sensation amongst other risks.  He was given the chance to ask questions, and alternatives were discussed.

## 2024-10-25 NOTE — PROCEDURES
Date: 10/25/2024     Pre procedure diagnosis: Peyronie's disease    Post procedure diagnosis:same    Surgeon: Darek     Assistant: None     Procedure performed: 1. Doppler ultrasound of the penis (complete and follow up)          2. Intracavernosal injection of pharmacologic agent   Blood loss: None     Specimen: None     Procedure in detail:   The risks, benefits, complications, treatment options, and expected outcomes were discussed with the patient. The patient concurred with the proposed plan, giving informed consent.  A time out was performed.     In the flaccid state the patient's cavernosal artery was found with the doppler ultrasound. This was first done on the R and then on the L. His peak systolic velocity was 7.55 cm/s on the R and 10.1 cm/s  on the L.  End diastolic velocity was 3.09 cm/s on the R and 1.24 cm on the L.   The resistive index was 0.59 on the R and 0.88 on the L.    Using standard sterile technique, the patient was then injected with 90 mg of papaverine. A full erection was achieved.  He had approximately 35 degree curvature to the L.  Plaque at the mid to distal shaft.    The cavernosal arteries were then measured again with the doppler ultrasound. His peak systolic velocity was 27.3  cm/s on the R and 14.6 cm/s on the L. End diastolic velocity was 7.17 cm/s on the R and 1.48 cm/s on the L.   The resistive index was 0.74 on the R and 0.90 on the L.    He tolerated the procedure well without complication. He will be observed to ensure detumescence. He will be discharged home in stable condition and follow up to discuss his treatment options in the next 1-2 weeks.

## 2024-12-06 ENCOUNTER — TELEPHONE (OUTPATIENT)
Dept: UROLOGY | Facility: CLINIC | Age: 54
End: 2024-12-06
Payer: OTHER GOVERNMENT

## 2025-07-23 DIAGNOSIS — S46.092A OTHER INJURY OF MUSCLE(S) AND TENDON(S) OF THE ROTATOR CUFF OF LEFT SHOULDER, INITIAL ENCOUNTER: Primary | ICD-10-CM

## 2025-07-24 ENCOUNTER — OFFICE VISIT (OUTPATIENT)
Dept: ORTHOPEDICS | Facility: CLINIC | Age: 55
End: 2025-07-24
Payer: OTHER GOVERNMENT

## 2025-07-24 ENCOUNTER — HOSPITAL ENCOUNTER (OUTPATIENT)
Dept: RADIOLOGY | Facility: HOSPITAL | Age: 55
Discharge: HOME | End: 2025-07-24
Attending: FAMILY MEDICINE
Payer: OTHER GOVERNMENT

## 2025-07-24 VITALS — HEIGHT: 70 IN | WEIGHT: 243.63 LBS | BODY MASS INDEX: 34.88 KG/M2

## 2025-07-24 DIAGNOSIS — M25.512 LEFT SHOULDER PAIN, UNSPECIFIED CHRONICITY: ICD-10-CM

## 2025-07-24 DIAGNOSIS — M12.812 LEFT ROTATOR CUFF TEAR ARTHROPATHY: Primary | ICD-10-CM

## 2025-07-24 DIAGNOSIS — M25.512 LEFT SHOULDER PAIN, UNSPECIFIED CHRONICITY: Primary | ICD-10-CM

## 2025-07-24 DIAGNOSIS — G89.29 CHRONIC LEFT SHOULDER PAIN: ICD-10-CM

## 2025-07-24 DIAGNOSIS — S46.092A OTHER INJURY OF MUSCLE(S) AND TENDON(S) OF THE ROTATOR CUFF OF LEFT SHOULDER, INITIAL ENCOUNTER: ICD-10-CM

## 2025-07-24 DIAGNOSIS — M75.102 LEFT ROTATOR CUFF TEAR ARTHROPATHY: Primary | ICD-10-CM

## 2025-07-24 DIAGNOSIS — M25.512 CHRONIC LEFT SHOULDER PAIN: ICD-10-CM

## 2025-07-24 PROCEDURE — 99204 OFFICE O/P NEW MOD 45 MIN: CPT | Mod: S$PBB,,, | Performed by: FAMILY MEDICINE

## 2025-07-24 PROCEDURE — 99999 PR PBB SHADOW E&M-EST. PATIENT-LVL IV: CPT | Mod: PBBFAC,,, | Performed by: FAMILY MEDICINE

## 2025-07-24 PROCEDURE — 73030 X-RAY EXAM OF SHOULDER: CPT | Mod: TC,PO,LT

## 2025-07-24 PROCEDURE — 99214 OFFICE O/P EST MOD 30 MIN: CPT | Mod: PBBFAC,25,PO | Performed by: FAMILY MEDICINE

## 2025-07-24 PROCEDURE — 73030 X-RAY EXAM OF SHOULDER: CPT | Mod: 26,LT,, | Performed by: RADIOLOGY

## 2025-07-24 NOTE — PROGRESS NOTES
Subjective     Patient ID: Tutu Herndon III is a 55 y.o. male.    Chief Complaint: Pain of the Left Shoulder      55-year-old male here today with complaints of left-sided shoulder pain.  This has been an ongoing problem for at least the last six months.  Can not recall any injury to the area.  Has pain with almost any range of motion especially reaching behind himself.  He works as a  for the [employer].  [facility].  States that an ultrasound was done that he was told showed a small rotator cuff tear.  He was given a cortisone injection which provided no relief.  He has tried physical therapy in his not noticed any relief from that as well.  He has been taking meloxicam and does report some relief there.  No previous history of shoulder injuries but he is aware of.    Pain  Pertinent negatives include no chest pain, chills, congestion, coughing, headaches, rash or sore throat.       Review of Systems   Constitutional: Negative for chills and decreased appetite.   HENT:  Negative for congestion and sore throat.    Eyes:  Negative for blurred vision.   Cardiovascular:  Negative for chest pain, dyspnea on exertion and palpitations.   Respiratory:  Negative for cough and shortness of breath.    Skin:  Negative for rash.   Neurological:  Negative for difficulty with concentration, disturbances in coordination and headaches.   Psychiatric/Behavioral:  Negative for altered mental status, depression, hallucinations, memory loss and suicidal ideas.           Objective     General    Nursing note and vitals reviewed.  Constitutional: He is oriented to person, place, and time. He appears well-developed and well-nourished.   HENT:   Nose: Nose normal.   Eyes: EOM are normal. Pupils are equal, round, and reactive to light.   Neck: Neck supple.   Cardiovascular:  Normal rate.            Pulmonary/Chest: Effort normal.   Abdominal: Soft.    Neurological: He is alert and oriented to person, place, and time. He has normal reflexes.   Psychiatric: He has a normal mood and affect. His behavior is normal. Judgment and thought content normal.         Right Shoulder Exam   Right shoulder exam is normal.    Inspection/Observation   Swelling: absent  Bruising: absent  Scars: absent  Deformity: absent  Scapular Winging: absent  Scapular Dyskinesia: negative    Range of Motion   Active abduction:  normal   Passive abduction:  normal   Extension:  normal   Forward Flexion:  normal   Forward Elevation: normal  Adduction: normal    Tests & Signs   Hameed test: negative  Impingement: negative  Active Compression Test (Laurel's Sign): negative  Speed's Test: negative  Anterior Drawer Test: 0   Posterior Drawer Test: 0    Other   Sensation: normal    Left Shoulder Exam     Inspection/Observation   Swelling: absent  Bruising: absent  Scars: absent  Deformity: absent  Scapular Winging: absent  Scapular Dyskinesia: negative    Tenderness   The patient is tender to palpation of the acromioclavicular joint and supraspinatus.    Range of Motion   Active abduction:  150   Forward Flexion:  150     Tests & Signs   Hameed test: positive  Impingement: positive  Active Compression test (Laurel's Sign): negative  Speed's Test: negative  Anterior Drawer Test: 0  Posterior Drawer Test: 0    Other   Sensation: normal       Muscle Strength   Right Upper Extremity   Shoulder Abduction: 5/5   Shoulder Internal Rotation: 5/5   Shoulder External Rotation: 5/5   Supraspinatus: 5/5   Subscapularis: 5/5   Biceps: 5/5   Left Upper Extremity  Shoulder Abduction: 5/5   Shoulder Internal Rotation: 5/5   Shoulder External Rotation: 5/5   Supraspinatus: 5/5   Subscapularis: 5/5   Biceps: 5/5     Vascular Exam     Right Pulses      Radial:                    2+      Left Pulses      Radial:                    2+      Physical Exam  Vitals and nursing note reviewed.   Constitutional:        Appearance: He is well-developed and well-nourished.   HENT:      Nose: Nose normal.   Eyes:      Extraocular Movements: EOM normal.      Pupils: Pupils are equal, round, and reactive to light.   Cardiovascular:      Rate and Rhythm: Normal rate.      Pulses:           Radial pulses are 2+ on the right side and 2+ on the left side.   Pulmonary:      Effort: Pulmonary effort is normal.   Abdominal:      Palpations: Abdomen is soft.   Musculoskeletal:      Right shoulder: No swelling or deformity.      Left shoulder: No swelling or deformity.      Cervical back: Normal range of motion and neck supple.   Neurological:      Mental Status: He is alert and oriented to person, place, and time.      Deep Tendon Reflexes: Reflexes are normal and symmetric.   Psychiatric:         Mood and Affect: Mood and affect normal.         Behavior: Behavior normal.         Thought Content: Thought content normal.         Judgment: Judgment normal.        Some slight degenerative changes seen in glenohumeral joint as well as the acromioclavicular joint.  Otherwise no obvious bony abnormalities and no acute fractures present.  No evidence of soft tissue injury.     Assessment and Plan     Encounter Diagnoses   Name Primary?    Other injury of muscle(s) and tendon(s) of the rotator cuff of left shoulder, initial encounter     Left rotator cuff tear arthropathy Yes    Chronic left shoulder pain          Tutu was seen today for pain.    Diagnoses and all orders for this visit:    Left rotator cuff tear arthropathy    Other injury of muscle(s) and tendon(s) of the rotator cuff of left shoulder, initial encounter  -     Ambulatory referral/consult to Orthopedics    Chronic left shoulder pain      Based on physical exam I do not suspect a significant rotator cuff tear although it is likely he has some tendinopathy with partial tearing.  [facility].  Going to get an MRI for further evaluation  of his shoulder and have him follow up to review the results and discuss further treatment options.

## 2025-08-07 ENCOUNTER — PATIENT MESSAGE (OUTPATIENT)
Dept: ORTHOPEDICS | Facility: CLINIC | Age: 55
End: 2025-08-07
Payer: OTHER GOVERNMENT

## 2025-08-07 ENCOUNTER — HOSPITAL ENCOUNTER (OUTPATIENT)
Dept: RADIOLOGY | Facility: HOSPITAL | Age: 55
Discharge: HOME | End: 2025-08-07
Attending: FAMILY MEDICINE
Payer: OTHER GOVERNMENT

## 2025-08-07 DIAGNOSIS — Z01.818 PREOP TESTING: Primary | ICD-10-CM

## 2025-08-07 DIAGNOSIS — M25.512 CHRONIC LEFT SHOULDER PAIN: ICD-10-CM

## 2025-08-07 DIAGNOSIS — G89.29 CHRONIC LEFT SHOULDER PAIN: ICD-10-CM

## 2025-08-07 NOTE — PROGRESS NOTES
Pt attempted MRI without PO anxiety meds. He was too claustrophobic and could not start exam. Informed him to contact ordering physician and inquire about some anxiety meds, then reschedule MRI exam. Patient stated understanding.

## 2025-08-12 RX ORDER — DIAZEPAM 10 MG/1
10 TABLET ORAL ONCE
Qty: 1 TABLET | Refills: 0 | Status: SHIPPED | OUTPATIENT
Start: 2025-08-12 | End: 2025-08-12

## 2025-08-18 ENCOUNTER — HOSPITAL ENCOUNTER (OUTPATIENT)
Dept: RADIOLOGY | Facility: HOSPITAL | Age: 55
Discharge: HOME | End: 2025-08-18
Attending: FAMILY MEDICINE
Payer: OTHER GOVERNMENT

## 2025-08-18 PROCEDURE — 73221 MRI JOINT UPR EXTREM W/O DYE: CPT | Mod: 26,LT,, | Performed by: RADIOLOGY

## 2025-08-18 PROCEDURE — 73221 MRI JOINT UPR EXTREM W/O DYE: CPT | Mod: TC,LT

## 2025-08-19 ENCOUNTER — OFFICE VISIT (OUTPATIENT)
Dept: ORTHOPEDICS | Facility: CLINIC | Age: 55
End: 2025-08-19
Payer: OTHER GOVERNMENT

## 2025-08-19 VITALS — HEIGHT: 70 IN | BODY MASS INDEX: 34.88 KG/M2 | WEIGHT: 243.63 LBS

## 2025-08-19 DIAGNOSIS — M25.512 LEFT SHOULDER PAIN, UNSPECIFIED CHRONICITY: ICD-10-CM

## 2025-08-19 DIAGNOSIS — G89.29 CHRONIC LEFT SHOULDER PAIN: ICD-10-CM

## 2025-08-19 DIAGNOSIS — M75.102 LEFT ROTATOR CUFF TEAR ARTHROPATHY: Primary | ICD-10-CM

## 2025-08-19 DIAGNOSIS — M25.512 CHRONIC LEFT SHOULDER PAIN: ICD-10-CM

## 2025-08-19 DIAGNOSIS — Z01.818 PREOP TESTING: ICD-10-CM

## 2025-08-19 DIAGNOSIS — M12.812 LEFT ROTATOR CUFF TEAR ARTHROPATHY: Primary | ICD-10-CM

## 2025-08-19 PROCEDURE — 99214 OFFICE O/P EST MOD 30 MIN: CPT | Mod: S$PBB,,, | Performed by: FAMILY MEDICINE

## 2025-08-19 PROCEDURE — 99999 PR PBB SHADOW E&M-EST. PATIENT-LVL III: CPT | Mod: PBBFAC,,, | Performed by: FAMILY MEDICINE

## 2025-08-19 PROCEDURE — 99213 OFFICE O/P EST LOW 20 MIN: CPT | Mod: PBBFAC,PO | Performed by: FAMILY MEDICINE

## 2025-08-19 RX ORDER — DIAZEPAM 10 MG/1
10 TABLET ORAL ONCE
Qty: 1 TABLET | Refills: 0 | Status: SHIPPED | OUTPATIENT
Start: 2025-08-19 | End: 2025-08-19

## (undated) DEVICE — YANKAUER FLEX NO VENT HI CAP

## (undated) DEVICE — GOWN TOGA SYS PEELWY ZIP 2 XL

## (undated) DEVICE — SOL NACL IRR 3000ML

## (undated) DEVICE — COVER PROXIMA MAYO STAND

## (undated) DEVICE — UNDERGLOVES BIOGEL PI SZ 7 LF

## (undated) DEVICE — PACK SIRUS BASIC V SURG STRL

## (undated) DEVICE — WRAP SHLDR HIP ACCU THRM PACK

## (undated) DEVICE — SUT ETHIBOND XTRA 5 V-37 GR

## (undated) DEVICE — DRAPE ORTH SPLIT 77X108IN

## (undated) DEVICE — SEE MEDLINE ITEM 152622

## (undated) DEVICE — DRAPE STERI-DRAPE 1000 17X11IN

## (undated) DEVICE — CLOSURE SKIN STERI STRIP 1/2X4

## (undated) DEVICE — DRESSING AQUACEL AG 3.5X10IN

## (undated) DEVICE — CUBE COLD THERAPY POLAR CARE

## (undated) DEVICE — SPONGE DERMACEA 4X4IN 12PLY

## (undated) DEVICE — GLOVE SURG ULTRA TOUCH 8.5

## (undated) DEVICE — DRAPE STERI U-SHAPED 47X51IN

## (undated) DEVICE — DRAPE HIP PCH 112X137X89IN

## (undated) DEVICE — GLOVE SURG ULTRA TOUCH 8

## (undated) DEVICE — SUT 3-0 VICRYL / SH (J416)

## (undated) DEVICE — YANKAUER OPEN TIP W/O VENT

## (undated) DEVICE — UNDERGLOVES BIOGEL PI SIZE 8.5

## (undated) DEVICE — SUT 2/0 30IN SILK BLK BRAI

## (undated) DEVICE — DRAPE THREE-QTR REINF 53X77IN

## (undated) DEVICE — ELECTRODE REM PLYHSV RETURN 9

## (undated) DEVICE — SYS CLSR DERMABOND PRINEO 22CM

## (undated) DEVICE — BLADE SAW SGTTL 4.72X25.4X1.27

## (undated) DEVICE — SYR 10CC LUER LOCK

## (undated) DEVICE — WRAPON POLAR PAD HIP FOR POLAR

## (undated) DEVICE — NDL MAYO 2

## (undated) DEVICE — SEE MEDLINE ITEM 146313

## (undated) DEVICE — STRAP OR TABLE 5IN X 72IN

## (undated) DEVICE — GLOVE BIOGEL PI MICRO INDIC 7

## (undated) DEVICE — SLEEVE SCD EXPRESS KNEE MEDIUM

## (undated) DEVICE — DRAPE INCISE IOBAN 2 23X17IN

## (undated) DEVICE — SPONGE LAP 18X18 PREWASHED

## (undated) DEVICE — DRESSING TRANS 4X4 TEGADERM

## (undated) DEVICE — LINER SUCTION 3000CC

## (undated) DEVICE — SUT CTD VICRYL BR CR/SH VIL

## (undated) DEVICE — ELECTRODE BLD EXT 6.50 ST DISP

## (undated) DEVICE — UNDERGLOVES BIOGEL PI SIZE 8

## (undated) DEVICE — SUT STRATAFIX PDS 1 CTX 18IN

## (undated) DEVICE — SUT MONOCRYL 3-0 PS-1

## (undated) DEVICE — SUT STRATAFIX SPIRAL VIOLET

## (undated) DEVICE — SEE MEDLINE ITEM 146420

## (undated) DEVICE — ALCOHOL 70% ISOP RUBBING 4OZ

## (undated) DEVICE — DRAPE U SPLIT SHEET 54X76IN

## (undated) DEVICE — SPONGE BULKEE II ABSRB 6X6.75

## (undated) DEVICE — CLIPPER BLADE MOD 4406 (CAREF)

## (undated) DEVICE — TOGA FLYTE PEEL AWAY XLARGE

## (undated) DEVICE — DRESSING AQUACEL AG ADV 3.5X12

## (undated) DEVICE — SUT CTD VICRYL 0 UND BR

## (undated) DEVICE — PACK CUSTOM UNIV BASIN SLI

## (undated) DEVICE — BNDG COFLEX FOAM LF2 ST 6X5YD

## (undated) DEVICE — SOCKINETTE IMPERVIOUS 12X48IN

## (undated) DEVICE — COVER TABLE 44X90 STERILE

## (undated) DEVICE — SUT MONOCRYL 4-0 PS-2

## (undated) DEVICE — INTERPULSE SET

## (undated) DEVICE — SOL 9P NACL IRR PIC IL

## (undated) DEVICE — DRAPE INCISE IOBAN 2 23X33IN

## (undated) DEVICE — GLOVE SURG ULTRA TOUCH 7

## (undated) DEVICE — SEE MEDLINE ITEM 146292

## (undated) DEVICE — TOWEL OR DISP STRL BLUE 4/PK

## (undated) DEVICE — SOL POVIDONE PREP IODINE 4 OZ

## (undated) DEVICE — SOL NACL IRR 1000ML BTL

## (undated) DEVICE — NDL SAFETY 21G X 1 1/2 ECLPSE

## (undated) DEVICE — APPLICATOR CHLORAPREP ORN 26ML

## (undated) DEVICE — MANIFOLD 4 PORT

## (undated) DEVICE — CONTAINER SPECIMEN STRL 4OZ

## (undated) DEVICE — SEE MEDLINE ITEM 157117

## (undated) DEVICE — GLOVE SURGEONS ULTRA TOUCH 6.5

## (undated) DEVICE — GLOVE BIOGEL PIMICRO INDIC 6.5

## (undated) DEVICE — BLADE SURG CARBON STEEL #10

## (undated) DEVICE — ELECTRODE BLADE INSULATED 1 IN

## (undated) DEVICE — SEE MEDLINE ITEM 157216

## (undated) DEVICE — DRAPE THYROID WITH ARMBOARD

## (undated) DEVICE — SOL IRR NACL .9% 3000ML

## (undated) DEVICE — GLOVE SURG ULTRA TOUCH 7.5

## (undated) DEVICE — ALCOHOL 70% ANTISEPTIC ISO 4OZ